# Patient Record
Sex: FEMALE | Race: WHITE | NOT HISPANIC OR LATINO | Employment: OTHER | URBAN - METROPOLITAN AREA
[De-identification: names, ages, dates, MRNs, and addresses within clinical notes are randomized per-mention and may not be internally consistent; named-entity substitution may affect disease eponyms.]

---

## 2017-01-11 ENCOUNTER — ALLSCRIPTS OFFICE VISIT (OUTPATIENT)
Dept: OTHER | Facility: OTHER | Age: 82
End: 2017-01-11

## 2017-01-19 ENCOUNTER — ALLSCRIPTS OFFICE VISIT (OUTPATIENT)
Dept: OTHER | Facility: OTHER | Age: 82
End: 2017-01-19

## 2017-01-20 ENCOUNTER — GENERIC CONVERSION - ENCOUNTER (OUTPATIENT)
Dept: OTHER | Facility: OTHER | Age: 82
End: 2017-01-20

## 2017-01-23 ENCOUNTER — GENERIC CONVERSION - ENCOUNTER (OUTPATIENT)
Dept: OTHER | Facility: OTHER | Age: 82
End: 2017-01-23

## 2018-01-01 ENCOUNTER — APPOINTMENT (EMERGENCY)
Dept: RADIOLOGY | Facility: HOSPITAL | Age: 83
End: 2018-01-01
Payer: MEDICARE

## 2018-01-01 ENCOUNTER — TELEPHONE (OUTPATIENT)
Dept: SURGERY | Facility: HOSPITAL | Age: 83
End: 2018-01-01

## 2018-01-01 ENCOUNTER — OFFICE VISIT (OUTPATIENT)
Dept: SURGERY | Facility: HOSPITAL | Age: 83
End: 2018-01-01
Payer: MEDICARE

## 2018-01-01 ENCOUNTER — HOSPITAL ENCOUNTER (EMERGENCY)
Facility: HOSPITAL | Age: 83
Discharge: HOME | End: 2018-08-17
Attending: EMERGENCY MEDICINE | Admitting: EMERGENCY MEDICINE
Payer: MEDICARE

## 2018-01-01 ENCOUNTER — HOSPITAL ENCOUNTER (INPATIENT)
Facility: HOSPITAL | Age: 83
LOS: 10 days | Discharge: NURSING, REHAB OR LONG-TERM CARE | DRG: 580 | End: 2018-07-18
Attending: EMERGENCY MEDICINE | Admitting: FAMILY MEDICINE
Payer: MEDICARE

## 2018-01-01 ENCOUNTER — OFFICE VISIT (OUTPATIENT)
Dept: INFECTIOUS DISEASES | Facility: HOSPITAL | Age: 83
End: 2018-01-01
Payer: MEDICARE

## 2018-01-01 ENCOUNTER — ANESTHESIA EVENT (INPATIENT)
Dept: PERIOP | Facility: HOSPITAL | Age: 83
DRG: 580 | End: 2018-01-01
Payer: MEDICARE

## 2018-01-01 ENCOUNTER — HOSPITAL ENCOUNTER (EMERGENCY)
Facility: HOSPITAL | Age: 83
Discharge: NURSING, REHAB OR LONG-TERM CARE | End: 2018-07-28
Attending: EMERGENCY MEDICINE
Payer: MEDICARE

## 2018-01-01 ENCOUNTER — ANESTHESIA (INPATIENT)
Dept: PERIOP | Facility: HOSPITAL | Age: 83
DRG: 580 | End: 2018-01-01
Payer: MEDICARE

## 2018-01-01 ENCOUNTER — OFFICE VISIT (OUTPATIENT)
Dept: GASTROENTEROLOGY | Facility: HOSPITAL | Age: 83
End: 2018-01-01
Attending: SURGERY
Payer: MEDICARE

## 2018-01-01 ENCOUNTER — APPOINTMENT (EMERGENCY)
Dept: CT IMAGING | Facility: HOSPITAL | Age: 83
DRG: 580 | End: 2018-01-01
Payer: MEDICARE

## 2018-01-01 ENCOUNTER — TELEPHONE (OUTPATIENT)
Dept: GASTROENTEROLOGY | Facility: HOSPITAL | Age: 83
End: 2018-01-01

## 2018-01-01 ENCOUNTER — APPOINTMENT (INPATIENT)
Dept: ULTRASOUND IMAGING | Facility: HOSPITAL | Age: 83
DRG: 580 | End: 2018-01-01
Payer: MEDICARE

## 2018-01-01 ENCOUNTER — OFFICE VISIT (OUTPATIENT)
Dept: SURGERY | Facility: HOSPITAL | Age: 83
End: 2018-01-01

## 2018-01-01 ENCOUNTER — APPOINTMENT (INPATIENT)
Dept: RADIOLOGY | Facility: HOSPITAL | Age: 83
DRG: 580 | End: 2018-01-01
Payer: MEDICARE

## 2018-01-01 ENCOUNTER — TELEPHONE (OUTPATIENT)
Dept: INTERVENTIONAL RADIOLOGY/VASCULAR | Facility: HOSPITAL | Age: 83
End: 2018-01-01

## 2018-01-01 ENCOUNTER — APPOINTMENT (INPATIENT)
Dept: NON INVASIVE DIAGNOSTICS | Facility: HOSPITAL | Age: 83
DRG: 580 | End: 2018-01-01
Payer: MEDICARE

## 2018-01-01 VITALS
RESPIRATION RATE: 18 BRPM | SYSTOLIC BLOOD PRESSURE: 155 MMHG | DIASTOLIC BLOOD PRESSURE: 70 MMHG | HEART RATE: 78 BPM | OXYGEN SATURATION: 97 % | TEMPERATURE: 97.6 F

## 2018-01-01 VITALS
HEART RATE: 75 BPM | TEMPERATURE: 98.1 F | SYSTOLIC BLOOD PRESSURE: 144 MMHG | RESPIRATION RATE: 18 BRPM | DIASTOLIC BLOOD PRESSURE: 63 MMHG | OXYGEN SATURATION: 98 % | HEIGHT: 56 IN | BODY MASS INDEX: 29.46 KG/M2 | WEIGHT: 130.95 LBS

## 2018-01-01 VITALS — DIASTOLIC BLOOD PRESSURE: 56 MMHG | TEMPERATURE: 98.1 F | HEART RATE: 70 BPM | SYSTOLIC BLOOD PRESSURE: 108 MMHG

## 2018-01-01 VITALS
HEART RATE: 73 BPM | TEMPERATURE: 98.1 F | BODY MASS INDEX: 24.08 KG/M2 | WEIGHT: 107.4 LBS | SYSTOLIC BLOOD PRESSURE: 144 MMHG | DIASTOLIC BLOOD PRESSURE: 66 MMHG

## 2018-01-01 VITALS — HEART RATE: 70 BPM | TEMPERATURE: 98.1 F | SYSTOLIC BLOOD PRESSURE: 108 MMHG | DIASTOLIC BLOOD PRESSURE: 56 MMHG

## 2018-01-01 VITALS
OXYGEN SATURATION: 94 % | DIASTOLIC BLOOD PRESSURE: 63 MMHG | SYSTOLIC BLOOD PRESSURE: 139 MMHG | WEIGHT: 130.95 LBS | TEMPERATURE: 99.3 F | HEART RATE: 74 BPM | BODY MASS INDEX: 29.36 KG/M2 | RESPIRATION RATE: 16 BRPM

## 2018-01-01 VITALS — TEMPERATURE: 98.1 F | SYSTOLIC BLOOD PRESSURE: 118 MMHG | DIASTOLIC BLOOD PRESSURE: 56 MMHG | HEART RATE: 75 BPM

## 2018-01-01 VITALS — BODY MASS INDEX: 21.97 KG/M2 | WEIGHT: 98 LBS

## 2018-01-01 DIAGNOSIS — K92.2 GASTROINTESTINAL HEMORRHAGE, UNSPECIFIED GASTROINTESTINAL HEMORRHAGE TYPE: ICD-10-CM

## 2018-01-01 DIAGNOSIS — L02.419 AXILLARY ABSCESS: Primary | ICD-10-CM

## 2018-01-01 DIAGNOSIS — J45.901 ASTHMA EXACERBATION, MILD: ICD-10-CM

## 2018-01-01 DIAGNOSIS — D50.9 IRON DEFICIENCY ANEMIA, UNSPECIFIED IRON DEFICIENCY ANEMIA TYPE: ICD-10-CM

## 2018-01-01 DIAGNOSIS — R22.31 MASS OF RIGHT AXILLA: ICD-10-CM

## 2018-01-01 DIAGNOSIS — R78.81 BACTEREMIA DUE TO GRAM-POSITIVE BACTERIA: ICD-10-CM

## 2018-01-01 DIAGNOSIS — R78.81 BACTEREMIA DUE TO METHICILLIN SUSCEPTIBLE STAPHYLOCOCCUS AUREUS (MSSA): ICD-10-CM

## 2018-01-01 DIAGNOSIS — L02.411 ABSCESS OF AXILLA, RIGHT: Primary | ICD-10-CM

## 2018-01-01 DIAGNOSIS — D64.9 ANEMIA: ICD-10-CM

## 2018-01-01 DIAGNOSIS — K62.3 RECTAL PROLAPSE: Primary | ICD-10-CM

## 2018-01-01 DIAGNOSIS — L02.411 ABSCESS OF AXILLA, RIGHT: ICD-10-CM

## 2018-01-01 DIAGNOSIS — R53.1 GENERALIZED WEAKNESS: ICD-10-CM

## 2018-01-01 DIAGNOSIS — B95.61 BACTEREMIA DUE TO METHICILLIN SUSCEPTIBLE STAPHYLOCOCCUS AUREUS (MSSA): Primary | ICD-10-CM

## 2018-01-01 DIAGNOSIS — R78.81 BACTEREMIA DUE TO METHICILLIN SUSCEPTIBLE STAPHYLOCOCCUS AUREUS (MSSA): Primary | ICD-10-CM

## 2018-01-01 DIAGNOSIS — K56.600 PARTIAL SMALL BOWEL OBSTRUCTION (HCC): ICD-10-CM

## 2018-01-01 DIAGNOSIS — K62.3 RECTAL PROLAPSE: ICD-10-CM

## 2018-01-01 DIAGNOSIS — L89.152 PRESSURE ULCER OF SACRAL REGION, STAGE 2 (HCC): Chronic | ICD-10-CM

## 2018-01-01 DIAGNOSIS — I10 BENIGN ESSENTIAL HYPERTENSION: ICD-10-CM

## 2018-01-01 DIAGNOSIS — N17.9 AKI (ACUTE KIDNEY INJURY) (HCC): Primary | ICD-10-CM

## 2018-01-01 DIAGNOSIS — B95.61 BACTEREMIA DUE TO METHICILLIN SUSCEPTIBLE STAPHYLOCOCCUS AUREUS (MSSA): ICD-10-CM

## 2018-01-01 DIAGNOSIS — Z79.2 RECEIVING INTRAVENOUS ANTIBIOTIC TREATMENT AS OUTPATIENT: ICD-10-CM

## 2018-01-01 LAB
ABO GROUP BLD: NORMAL
ALBUMIN SERPL BCP-MCNC: 1.7 G/DL (ref 3.5–5)
ALBUMIN SERPL BCP-MCNC: 1.8 G/DL (ref 3.5–5)
ALBUMIN SERPL BCP-MCNC: 2.3 G/DL (ref 3.5–5)
ALBUMIN SERPL BCP-MCNC: 2.7 G/DL (ref 3.5–5)
ALBUMIN SERPL BCP-MCNC: 2.8 G/DL (ref 3.5–5)
ALP SERPL-CCNC: 65 U/L (ref 46–116)
ALP SERPL-CCNC: 68 U/L (ref 46–116)
ALP SERPL-CCNC: 70 U/L (ref 46–116)
ALP SERPL-CCNC: 75 U/L (ref 46–116)
ALP SERPL-CCNC: 79 U/L (ref 46–116)
ALT SERPL W P-5'-P-CCNC: 13 U/L (ref 12–78)
ALT SERPL W P-5'-P-CCNC: 14 U/L (ref 12–78)
ALT SERPL W P-5'-P-CCNC: 15 U/L (ref 12–78)
ALT SERPL W P-5'-P-CCNC: 15 U/L (ref 12–78)
ALT SERPL W P-5'-P-CCNC: 18 U/L (ref 12–78)
ANION GAP BLD CALC-SCNC: 13 MMOL/L (ref 4–13)
ANION GAP SERPL CALCULATED.3IONS-SCNC: 10 MMOL/L (ref 4–13)
ANION GAP SERPL CALCULATED.3IONS-SCNC: 5 MMOL/L (ref 4–13)
ANION GAP SERPL CALCULATED.3IONS-SCNC: 5 MMOL/L (ref 4–13)
ANION GAP SERPL CALCULATED.3IONS-SCNC: 6 MMOL/L (ref 4–13)
ANION GAP SERPL CALCULATED.3IONS-SCNC: 7 MMOL/L (ref 4–13)
ANION GAP SERPL CALCULATED.3IONS-SCNC: 8 MMOL/L (ref 4–13)
ANION GAP SERPL CALCULATED.3IONS-SCNC: 8 MMOL/L (ref 4–13)
ANION GAP SERPL CALCULATED.3IONS-SCNC: 9 MMOL/L (ref 4–13)
ANISOCYTOSIS BLD QL SMEAR: PRESENT
APTT PPP: 36 SECONDS (ref 24–36)
APTT PPP: 45 SECONDS (ref 24–36)
AST SERPL W P-5'-P-CCNC: 14 U/L (ref 5–45)
AST SERPL W P-5'-P-CCNC: 14 U/L (ref 5–45)
AST SERPL W P-5'-P-CCNC: 19 U/L (ref 5–45)
AST SERPL W P-5'-P-CCNC: 19 U/L (ref 5–45)
AST SERPL W P-5'-P-CCNC: 43 U/L (ref 5–45)
ATRIAL RATE: 79 BPM
ATRIAL RATE: 91 BPM
BACTERIA BLD CULT: ABNORMAL
BACTERIA BLD CULT: ABNORMAL
BACTERIA BLD CULT: NORMAL
BACTERIA SPEC ANAEROBE CULT: NORMAL
BACTERIA WND AEROBE CULT: ABNORMAL
BASOPHILS # BLD AUTO: 0.01 THOUSANDS/ΜL (ref 0–0.1)
BASOPHILS # BLD AUTO: 0.01 THOUSANDS/ΜL (ref 0–0.1)
BASOPHILS # BLD AUTO: 0.02 THOUSANDS/ΜL (ref 0–0.1)
BASOPHILS # BLD AUTO: 0.02 THOUSANDS/ΜL (ref 0–0.1)
BASOPHILS # BLD AUTO: 0.03 THOUSANDS/ΜL (ref 0–0.1)
BASOPHILS # BLD MANUAL: 0 THOUSAND/UL (ref 0–0.1)
BASOPHILS NFR BLD AUTO: 0 % (ref 0–1)
BASOPHILS NFR BLD AUTO: 1 % (ref 0–1)
BASOPHILS NFR BLD AUTO: 1 % (ref 0–1)
BASOPHILS NFR MAR MANUAL: 0 % (ref 0–1)
BILIRUB SERPL-MCNC: 0.4 MG/DL (ref 0.2–1)
BILIRUB SERPL-MCNC: 0.4 MG/DL (ref 0.2–1)
BILIRUB SERPL-MCNC: 0.5 MG/DL (ref 0.2–1)
BILIRUB SERPL-MCNC: 0.6 MG/DL (ref 0.2–1)
BILIRUB SERPL-MCNC: 0.9 MG/DL (ref 0.2–1)
BILIRUB UR QL STRIP: NEGATIVE
BLD GP AB SCN SERPL QL: NEGATIVE
BUN BLD-MCNC: 38 MG/DL (ref 5–25)
BUN SERPL-MCNC: 12 MG/DL (ref 5–25)
BUN SERPL-MCNC: 14 MG/DL (ref 5–25)
BUN SERPL-MCNC: 14 MG/DL (ref 5–25)
BUN SERPL-MCNC: 16 MG/DL (ref 5–25)
BUN SERPL-MCNC: 25 MG/DL (ref 5–25)
BUN SERPL-MCNC: 26 MG/DL (ref 5–25)
BUN SERPL-MCNC: 32 MG/DL (ref 5–25)
BUN SERPL-MCNC: 41 MG/DL (ref 5–25)
BUN SERPL-MCNC: 43 MG/DL (ref 5–25)
BUN SERPL-MCNC: 45 MG/DL (ref 5–25)
CA-I BLD-SCNC: 1.21 MMOL/L (ref 1.12–1.32)
CALCIUM SERPL-MCNC: 7.6 MG/DL (ref 8.3–10.1)
CALCIUM SERPL-MCNC: 7.8 MG/DL (ref 8.3–10.1)
CALCIUM SERPL-MCNC: 7.9 MG/DL (ref 8.3–10.1)
CALCIUM SERPL-MCNC: 8 MG/DL (ref 8.3–10.1)
CALCIUM SERPL-MCNC: 8 MG/DL (ref 8.3–10.1)
CALCIUM SERPL-MCNC: 8.3 MG/DL (ref 8.3–10.1)
CALCIUM SERPL-MCNC: 8.5 MG/DL (ref 8.3–10.1)
CALCIUM SERPL-MCNC: 8.6 MG/DL (ref 8.3–10.1)
CALCIUM SERPL-MCNC: 8.7 MG/DL (ref 8.3–10.1)
CALCIUM SERPL-MCNC: 8.7 MG/DL (ref 8.3–10.1)
CHLORIDE BLD-SCNC: 106 MMOL/L (ref 100–108)
CHLORIDE SERPL-SCNC: 103 MMOL/L (ref 100–108)
CHLORIDE SERPL-SCNC: 104 MMOL/L (ref 100–108)
CHLORIDE SERPL-SCNC: 105 MMOL/L (ref 100–108)
CHLORIDE SERPL-SCNC: 106 MMOL/L (ref 100–108)
CHLORIDE SERPL-SCNC: 106 MMOL/L (ref 100–108)
CHLORIDE SERPL-SCNC: 107 MMOL/L (ref 100–108)
CHLORIDE SERPL-SCNC: 109 MMOL/L (ref 100–108)
CHLORIDE SERPL-SCNC: 99 MMOL/L (ref 100–108)
CLARITY UR: NORMAL
CO2 SERPL-SCNC: 20 MMOL/L (ref 21–32)
CO2 SERPL-SCNC: 21 MMOL/L (ref 21–32)
CO2 SERPL-SCNC: 23 MMOL/L (ref 21–32)
CO2 SERPL-SCNC: 23 MMOL/L (ref 21–32)
CO2 SERPL-SCNC: 24 MMOL/L (ref 21–32)
CO2 SERPL-SCNC: 27 MMOL/L (ref 21–32)
CO2 SERPL-SCNC: 28 MMOL/L (ref 21–32)
CO2 SERPL-SCNC: 32 MMOL/L (ref 21–32)
COLOR UR: YELLOW
CREAT BLD-MCNC: 1.4 MG/DL (ref 0.6–1.3)
CREAT SERPL-MCNC: 0.99 MG/DL (ref 0.6–1.3)
CREAT SERPL-MCNC: 0.99 MG/DL (ref 0.6–1.3)
CREAT SERPL-MCNC: 1.07 MG/DL (ref 0.6–1.3)
CREAT SERPL-MCNC: 1.12 MG/DL (ref 0.6–1.3)
CREAT SERPL-MCNC: 1.13 MG/DL (ref 0.6–1.3)
CREAT SERPL-MCNC: 1.25 MG/DL (ref 0.6–1.3)
CREAT SERPL-MCNC: 1.27 MG/DL (ref 0.6–1.3)
CREAT SERPL-MCNC: 1.3 MG/DL (ref 0.6–1.3)
CREAT SERPL-MCNC: 1.96 MG/DL (ref 0.6–1.3)
CREAT SERPL-MCNC: 2.47 MG/DL (ref 0.6–1.3)
CREAT UR-MCNC: 187 MG/DL
EOSINOPHIL # BLD AUTO: 0.05 THOUSAND/ΜL (ref 0–0.61)
EOSINOPHIL # BLD AUTO: 0.05 THOUSAND/ΜL (ref 0–0.61)
EOSINOPHIL # BLD AUTO: 0.06 THOUSAND/ΜL (ref 0–0.61)
EOSINOPHIL # BLD AUTO: 0.08 THOUSAND/ΜL (ref 0–0.61)
EOSINOPHIL # BLD AUTO: 0.1 THOUSAND/ΜL (ref 0–0.61)
EOSINOPHIL # BLD MANUAL: 0 THOUSAND/UL (ref 0–0.4)
EOSINOPHIL NFR BLD AUTO: 1 % (ref 0–6)
EOSINOPHIL NFR BLD AUTO: 2 % (ref 0–6)
EOSINOPHIL NFR BLD AUTO: 2 % (ref 0–6)
EOSINOPHIL NFR BLD MANUAL: 0 % (ref 0–6)
ERYTHROCYTE [DISTWIDTH] IN BLOOD BY AUTOMATED COUNT: 14.6 % (ref 11.6–15.1)
ERYTHROCYTE [DISTWIDTH] IN BLOOD BY AUTOMATED COUNT: 15.7 % (ref 11.6–15.1)
ERYTHROCYTE [DISTWIDTH] IN BLOOD BY AUTOMATED COUNT: 15.8 % (ref 11.6–15.1)
ERYTHROCYTE [DISTWIDTH] IN BLOOD BY AUTOMATED COUNT: 16 % (ref 11.6–15.1)
ERYTHROCYTE [DISTWIDTH] IN BLOOD BY AUTOMATED COUNT: 16.1 % (ref 11.6–15.1)
ERYTHROCYTE [DISTWIDTH] IN BLOOD BY AUTOMATED COUNT: 16.2 % (ref 11.6–15.1)
ERYTHROCYTE [DISTWIDTH] IN BLOOD BY AUTOMATED COUNT: 16.5 % (ref 11.6–15.1)
ERYTHROCYTE [DISTWIDTH] IN BLOOD BY AUTOMATED COUNT: 16.7 % (ref 11.6–15.1)
ERYTHROCYTE [DISTWIDTH] IN BLOOD BY AUTOMATED COUNT: 16.8 % (ref 11.6–15.1)
ERYTHROCYTE [DISTWIDTH] IN BLOOD BY AUTOMATED COUNT: 16.8 % (ref 11.6–15.1)
EST. AVERAGE GLUCOSE BLD GHB EST-MCNC: 111 MG/DL
FERRITIN SERPL-MCNC: 142 NG/ML (ref 8–388)
GFR SERPL CREATININE-BSD FRML MDRD: 18 ML/MIN/1.73SQ M
GFR SERPL CREATININE-BSD FRML MDRD: 23 ML/MIN/1.73SQ M
GFR SERPL CREATININE-BSD FRML MDRD: 35 ML/MIN/1.73SQ M
GFR SERPL CREATININE-BSD FRML MDRD: 39 ML/MIN/1.73SQ M
GFR SERPL CREATININE-BSD FRML MDRD: 40 ML/MIN/1.73SQ M
GFR SERPL CREATININE-BSD FRML MDRD: 40 ML/MIN/1.73SQ M
GFR SERPL CREATININE-BSD FRML MDRD: 46 ML/MIN/1.73SQ M
GFR SERPL CREATININE-BSD FRML MDRD: 46 ML/MIN/1.73SQ M
GFR SERPL CREATININE-BSD FRML MDRD: 49 ML/MIN/1.73SQ M
GFR SERPL CREATININE-BSD FRML MDRD: 54 ML/MIN/1.73SQ M
GFR SERPL CREATININE-BSD FRML MDRD: 54 ML/MIN/1.73SQ M
GLUCOSE SERPL-MCNC: 112 MG/DL (ref 65–140)
GLUCOSE SERPL-MCNC: 115 MG/DL (ref 65–140)
GLUCOSE SERPL-MCNC: 116 MG/DL (ref 65–140)
GLUCOSE SERPL-MCNC: 120 MG/DL (ref 65–140)
GLUCOSE SERPL-MCNC: 123 MG/DL (ref 65–140)
GLUCOSE SERPL-MCNC: 127 MG/DL (ref 65–140)
GLUCOSE SERPL-MCNC: 128 MG/DL (ref 65–140)
GLUCOSE SERPL-MCNC: 141 MG/DL (ref 65–140)
GLUCOSE SERPL-MCNC: 156 MG/DL (ref 65–140)
GLUCOSE SERPL-MCNC: 232 MG/DL (ref 65–140)
GLUCOSE SERPL-MCNC: 99 MG/DL (ref 65–140)
GLUCOSE UR STRIP-MCNC: NEGATIVE MG/DL
GRAM STN SPEC: ABNORMAL
HBA1C MFR BLD: 5.5 % (ref 4.2–6.3)
HCT VFR BLD AUTO: 23.5 % (ref 34.8–46.1)
HCT VFR BLD AUTO: 23.9 % (ref 34.8–46.1)
HCT VFR BLD AUTO: 24.5 % (ref 34.8–46.1)
HCT VFR BLD AUTO: 25.3 % (ref 34.8–46.1)
HCT VFR BLD AUTO: 25.3 % (ref 34.8–46.1)
HCT VFR BLD AUTO: 25.9 % (ref 34.8–46.1)
HCT VFR BLD AUTO: 26.4 % (ref 34.8–46.1)
HCT VFR BLD AUTO: 26.9 % (ref 34.8–46.1)
HCT VFR BLD AUTO: 28.1 % (ref 34.8–46.1)
HCT VFR BLD AUTO: 29.6 % (ref 34.8–46.1)
HCT VFR BLD CALC: 28 % (ref 34.8–46.1)
HEMOCCULT STL QL: NEGATIVE
HGB BLD-MCNC: 7.4 G/DL (ref 11.5–15.4)
HGB BLD-MCNC: 7.5 G/DL (ref 11.5–15.4)
HGB BLD-MCNC: 7.8 G/DL (ref 11.5–15.4)
HGB BLD-MCNC: 7.9 G/DL (ref 11.5–15.4)
HGB BLD-MCNC: 8.2 G/DL (ref 11.5–15.4)
HGB BLD-MCNC: 8.3 G/DL (ref 11.5–15.4)
HGB BLD-MCNC: 8.5 G/DL (ref 11.5–15.4)
HGB BLD-MCNC: 8.6 G/DL (ref 11.5–15.4)
HGB BLD-MCNC: 9 G/DL (ref 11.5–15.4)
HGB BLD-MCNC: 9.1 G/DL (ref 11.5–15.4)
HGB BLDA-MCNC: 9.5 G/DL (ref 11.5–15.4)
HGB UR QL STRIP.AUTO: NEGATIVE
IMM GRANULOCYTES # BLD AUTO: 0.01 THOUSAND/UL (ref 0–0.2)
IMM GRANULOCYTES NFR BLD AUTO: 0 % (ref 0–2)
INR PPP: 1.05 (ref 0.86–1.17)
INR PPP: 1.11 (ref 0.86–1.17)
INR PPP: 1.13 (ref 0.86–1.17)
INR PPP: 1.16 (ref 0.86–1.17)
IRON SATN MFR SERPL: 5 %
IRON SERPL-MCNC: 13 UG/DL (ref 50–170)
KETONES UR STRIP-MCNC: NEGATIVE MG/DL
LEUKOCYTE ESTERASE UR QL STRIP: NEGATIVE
LYMPHOCYTES # BLD AUTO: 0.64 THOUSAND/UL (ref 0.6–4.47)
LYMPHOCYTES # BLD AUTO: 0.81 THOUSANDS/ΜL (ref 0.6–4.47)
LYMPHOCYTES # BLD AUTO: 0.93 THOUSANDS/ΜL (ref 0.6–4.47)
LYMPHOCYTES # BLD AUTO: 1.06 THOUSANDS/ΜL (ref 0.6–4.47)
LYMPHOCYTES # BLD AUTO: 1.17 THOUSANDS/ΜL (ref 0.6–4.47)
LYMPHOCYTES # BLD AUTO: 1.88 THOUSANDS/ΜL (ref 0.6–4.47)
LYMPHOCYTES # BLD AUTO: 5 % (ref 14–44)
LYMPHOCYTES NFR BLD AUTO: 15 % (ref 14–44)
LYMPHOCYTES NFR BLD AUTO: 16 % (ref 14–44)
LYMPHOCYTES NFR BLD AUTO: 21 % (ref 14–44)
LYMPHOCYTES NFR BLD AUTO: 22 % (ref 14–44)
LYMPHOCYTES NFR BLD AUTO: 38 % (ref 14–44)
MAGNESIUM SERPL-MCNC: 1.6 MG/DL (ref 1.6–2.6)
MAGNESIUM SERPL-MCNC: 1.9 MG/DL (ref 1.6–2.6)
MAGNESIUM SERPL-MCNC: 1.9 MG/DL (ref 1.6–2.6)
MCH RBC QN AUTO: 28.8 PG (ref 26.8–34.3)
MCH RBC QN AUTO: 29.6 PG (ref 26.8–34.3)
MCH RBC QN AUTO: 29.6 PG (ref 26.8–34.3)
MCH RBC QN AUTO: 29.7 PG (ref 26.8–34.3)
MCH RBC QN AUTO: 29.8 PG (ref 26.8–34.3)
MCH RBC QN AUTO: 29.9 PG (ref 26.8–34.3)
MCH RBC QN AUTO: 29.9 PG (ref 26.8–34.3)
MCH RBC QN AUTO: 30.1 PG (ref 26.8–34.3)
MCH RBC QN AUTO: 30.2 PG (ref 26.8–34.3)
MCH RBC QN AUTO: 30.7 PG (ref 26.8–34.3)
MCHC RBC AUTO-ENTMCNC: 30.4 G/DL (ref 31.4–37.4)
MCHC RBC AUTO-ENTMCNC: 31 G/DL (ref 31.4–37.4)
MCHC RBC AUTO-ENTMCNC: 31.2 G/DL (ref 31.4–37.4)
MCHC RBC AUTO-ENTMCNC: 31.7 G/DL (ref 31.4–37.4)
MCHC RBC AUTO-ENTMCNC: 31.8 G/DL (ref 31.4–37.4)
MCHC RBC AUTO-ENTMCNC: 31.9 G/DL (ref 31.4–37.4)
MCHC RBC AUTO-ENTMCNC: 32 G/DL (ref 31.4–37.4)
MCHC RBC AUTO-ENTMCNC: 32.2 G/DL (ref 31.4–37.4)
MCHC RBC AUTO-ENTMCNC: 32.4 G/DL (ref 31.4–37.4)
MCHC RBC AUTO-ENTMCNC: 32.8 G/DL (ref 31.4–37.4)
MCV RBC AUTO: 92 FL (ref 82–98)
MCV RBC AUTO: 92 FL (ref 82–98)
MCV RBC AUTO: 93 FL (ref 82–98)
MCV RBC AUTO: 94 FL (ref 82–98)
MCV RBC AUTO: 94 FL (ref 82–98)
MCV RBC AUTO: 95 FL (ref 82–98)
MCV RBC AUTO: 97 FL (ref 82–98)
MCV RBC AUTO: 98 FL (ref 82–98)
MICROALBUMIN UR-MCNC: 20.7 MG/L (ref 0–20)
MICROALBUMIN/CREAT 24H UR: 11 MG/G CREATININE (ref 0–30)
MICROCYTES BLD QL AUTO: PRESENT
MONOCYTES # BLD AUTO: 0.13 THOUSAND/UL (ref 0–1.22)
MONOCYTES # BLD AUTO: 0.29 THOUSAND/ΜL (ref 0.17–1.22)
MONOCYTES # BLD AUTO: 0.36 THOUSAND/ΜL (ref 0.17–1.22)
MONOCYTES # BLD AUTO: 0.36 THOUSAND/ΜL (ref 0.17–1.22)
MONOCYTES # BLD AUTO: 0.39 THOUSAND/ΜL (ref 0.17–1.22)
MONOCYTES # BLD AUTO: 0.47 THOUSAND/ΜL (ref 0.17–1.22)
MONOCYTES NFR BLD AUTO: 6 % (ref 4–12)
MONOCYTES NFR BLD AUTO: 7 % (ref 4–12)
MONOCYTES NFR BLD AUTO: 9 % (ref 4–12)
MONOCYTES NFR BLD: 1 % (ref 4–12)
NEUTROPHILS # BLD AUTO: 2.66 THOUSANDS/ΜL (ref 1.85–7.62)
NEUTROPHILS # BLD AUTO: 3.03 THOUSANDS/ΜL (ref 1.85–7.62)
NEUTROPHILS # BLD AUTO: 3.96 THOUSANDS/ΜL (ref 1.85–7.62)
NEUTROPHILS # BLD AUTO: 4.16 THOUSANDS/ΜL (ref 1.85–7.62)
NEUTROPHILS # BLD AUTO: 5.18 THOUSANDS/ΜL (ref 1.85–7.62)
NEUTROPHILS # BLD MANUAL: 12.03 THOUSAND/UL (ref 1.85–7.62)
NEUTS SEG NFR BLD AUTO: 52 % (ref 43–75)
NEUTS SEG NFR BLD AUTO: 70 % (ref 43–75)
NEUTS SEG NFR BLD AUTO: 71 % (ref 43–75)
NEUTS SEG NFR BLD AUTO: 75 % (ref 43–75)
NEUTS SEG NFR BLD AUTO: 78 % (ref 43–75)
NEUTS SEG NFR BLD AUTO: 94 % (ref 43–75)
NITRITE UR QL STRIP: NEGATIVE
NRBC BLD AUTO-RTO: 0 /100 WBCS
OSMOLALITY UR: 351 MMOL/KG
P AXIS: 39 DEGREES
P AXIS: 93 DEGREES
PCO2 BLD: 26 MMOL/L (ref 21–32)
PH UR STRIP.AUTO: 5 [PH] (ref 4.5–8)
PHOSPHATE SERPL-MCNC: 3 MG/DL (ref 2.3–4.1)
PLATELET # BLD AUTO: 109 THOUSANDS/UL (ref 149–390)
PLATELET # BLD AUTO: 115 THOUSANDS/UL (ref 149–390)
PLATELET # BLD AUTO: 117 THOUSANDS/UL (ref 149–390)
PLATELET # BLD AUTO: 129 THOUSANDS/UL (ref 149–390)
PLATELET # BLD AUTO: 141 THOUSANDS/UL (ref 149–390)
PLATELET # BLD AUTO: 143 THOUSANDS/UL (ref 149–390)
PLATELET # BLD AUTO: 148 THOUSANDS/UL (ref 149–390)
PLATELET # BLD AUTO: 154 THOUSANDS/UL (ref 149–390)
PLATELET # BLD AUTO: 200 THOUSANDS/UL (ref 149–390)
PLATELET # BLD AUTO: 206 THOUSANDS/UL (ref 149–390)
PLATELET BLD QL SMEAR: ABNORMAL
PMV BLD AUTO: 10.6 FL (ref 8.9–12.7)
PMV BLD AUTO: 10.6 FL (ref 8.9–12.7)
PMV BLD AUTO: 10.8 FL (ref 8.9–12.7)
PMV BLD AUTO: 11 FL (ref 8.9–12.7)
PMV BLD AUTO: 11.1 FL (ref 8.9–12.7)
PMV BLD AUTO: 11.4 FL (ref 8.9–12.7)
PMV BLD AUTO: 11.5 FL (ref 8.9–12.7)
PMV BLD AUTO: 12 FL (ref 8.9–12.7)
PMV BLD AUTO: 9.4 FL (ref 8.9–12.7)
PMV BLD AUTO: 9.9 FL (ref 8.9–12.7)
POTASSIUM BLD-SCNC: 4.3 MMOL/L (ref 3.5–5.3)
POTASSIUM SERPL-SCNC: 3.3 MMOL/L (ref 3.5–5.3)
POTASSIUM SERPL-SCNC: 3.4 MMOL/L (ref 3.5–5.3)
POTASSIUM SERPL-SCNC: 3.6 MMOL/L (ref 3.5–5.3)
POTASSIUM SERPL-SCNC: 3.6 MMOL/L (ref 3.5–5.3)
POTASSIUM SERPL-SCNC: 3.7 MMOL/L (ref 3.5–5.3)
POTASSIUM SERPL-SCNC: 3.8 MMOL/L (ref 3.5–5.3)
POTASSIUM SERPL-SCNC: 3.9 MMOL/L (ref 3.5–5.3)
POTASSIUM SERPL-SCNC: 4.1 MMOL/L (ref 3.5–5.3)
POTASSIUM SERPL-SCNC: 4.6 MMOL/L (ref 3.5–5.3)
POTASSIUM SERPL-SCNC: 6.1 MMOL/L (ref 3.5–5.3)
PR INTERVAL: 124 MS
PR INTERVAL: 144 MS
PROCALCITONIN SERPL-MCNC: 1.32 NG/ML
PROT SERPL-MCNC: 5.1 G/DL (ref 6.4–8.2)
PROT SERPL-MCNC: 5.3 G/DL (ref 6.4–8.2)
PROT SERPL-MCNC: 5.8 G/DL (ref 6.4–8.2)
PROT SERPL-MCNC: 6.5 G/DL (ref 6.4–8.2)
PROT SERPL-MCNC: 7 G/DL (ref 6.4–8.2)
PROT UR STRIP-MCNC: NEGATIVE MG/DL
PROT UR-MCNC: 34 MG/DL
PROT/CREAT UR: 0.18 MG/G{CREAT} (ref 0–0.1)
PROTHROMBIN TIME: 13.2 SECONDS (ref 11.8–14.2)
PROTHROMBIN TIME: 13.8 SECONDS (ref 11.8–14.2)
PROTHROMBIN TIME: 14 SECONDS (ref 11.8–14.2)
PROTHROMBIN TIME: 14.2 SECONDS (ref 11.8–14.2)
QRS AXIS: 65 DEGREES
QRS AXIS: 72 DEGREES
QRSD INTERVAL: 68 MS
QRSD INTERVAL: 82 MS
QT INTERVAL: 358 MS
QT INTERVAL: 376 MS
QTC INTERVAL: 431 MS
QTC INTERVAL: 440 MS
RBC # BLD AUTO: 2.52 MILLION/UL (ref 3.81–5.12)
RBC # BLD AUTO: 2.57 MILLION/UL (ref 3.81–5.12)
RBC # BLD AUTO: 2.59 MILLION/UL (ref 3.81–5.12)
RBC # BLD AUTO: 2.62 MILLION/UL (ref 3.81–5.12)
RBC # BLD AUTO: 2.7 MILLION/UL (ref 3.81–5.12)
RBC # BLD AUTO: 2.77 MILLION/UL (ref 3.81–5.12)
RBC # BLD AUTO: 2.84 MILLION/UL (ref 3.81–5.12)
RBC # BLD AUTO: 2.91 MILLION/UL (ref 3.81–5.12)
RBC # BLD AUTO: 3.03 MILLION/UL (ref 3.81–5.12)
RBC # BLD AUTO: 3.04 MILLION/UL (ref 3.81–5.12)
RETICS # AUTO: NORMAL 10*3/UL (ref 14097–95744)
RETICS # CALC: 1.56 % (ref 0.37–1.87)
RH BLD: POSITIVE
SODIUM 24H UR-SCNC: 8 MOL/L
SODIUM BLD-SCNC: 140 MMOL/L (ref 136–145)
SODIUM SERPL-SCNC: 133 MMOL/L (ref 136–145)
SODIUM SERPL-SCNC: 134 MMOL/L (ref 136–145)
SODIUM SERPL-SCNC: 134 MMOL/L (ref 136–145)
SODIUM SERPL-SCNC: 135 MMOL/L (ref 136–145)
SODIUM SERPL-SCNC: 136 MMOL/L (ref 136–145)
SODIUM SERPL-SCNC: 137 MMOL/L (ref 136–145)
SODIUM SERPL-SCNC: 138 MMOL/L (ref 136–145)
SODIUM SERPL-SCNC: 139 MMOL/L (ref 136–145)
SODIUM SERPL-SCNC: 139 MMOL/L (ref 136–145)
SODIUM SERPL-SCNC: 140 MMOL/L (ref 136–145)
SP GR UR STRIP.AUTO: 1.02 (ref 1–1.03)
SPECIMEN EXPIRATION DATE: NORMAL
SPECIMEN SOURCE: ABNORMAL
T WAVE AXIS: 48 DEGREES
T WAVE AXIS: 53 DEGREES
TIBC SERPL-MCNC: 273 UG/DL (ref 250–450)
TOTAL CELLS COUNTED SPEC: 100
TROPONIN I SERPL-MCNC: <0.02 NG/ML
TROPONIN I SERPL-MCNC: <0.02 NG/ML
UROBILINOGEN UR QL STRIP.AUTO: 0.2 E.U./DL
UUN 24H UR-MCNC: 594 MG/DL
VENTRICULAR RATE: 79 BPM
VENTRICULAR RATE: 91 BPM
WBC # BLD AUTO: 11.53 THOUSAND/UL (ref 4.31–10.16)
WBC # BLD AUTO: 12.8 THOUSAND/UL (ref 4.31–10.16)
WBC # BLD AUTO: 13.17 THOUSAND/UL (ref 4.31–10.16)
WBC # BLD AUTO: 17.51 THOUSAND/UL (ref 4.31–10.16)
WBC # BLD AUTO: 4.33 THOUSAND/UL (ref 4.31–10.16)
WBC # BLD AUTO: 5.02 THOUSAND/UL (ref 4.31–10.16)
WBC # BLD AUTO: 5.55 THOUSAND/UL (ref 4.31–10.16)
WBC # BLD AUTO: 5.56 THOUSAND/UL (ref 4.31–10.16)
WBC # BLD AUTO: 6.16 THOUSAND/UL (ref 4.31–10.16)
WBC # BLD AUTO: 6.69 THOUSAND/UL (ref 4.31–10.16)

## 2018-01-01 PROCEDURE — 85730 THROMBOPLASTIN TIME PARTIAL: CPT | Performed by: PHYSICIAN ASSISTANT

## 2018-01-01 PROCEDURE — 85025 COMPLETE CBC W/AUTO DIFF WBC: CPT | Performed by: EMERGENCY MEDICINE

## 2018-01-01 PROCEDURE — 99238 HOSP IP/OBS DSCHRG MGMT 30/<: CPT | Performed by: PHYSICIAN ASSISTANT

## 2018-01-01 PROCEDURE — G0425 INPT/ED TELECONSULT30: HCPCS | Performed by: INTERNAL MEDICINE

## 2018-01-01 PROCEDURE — 86850 RBC ANTIBODY SCREEN: CPT | Performed by: EMERGENCY MEDICINE

## 2018-01-01 PROCEDURE — 73030 X-RAY EXAM OF SHOULDER: CPT

## 2018-01-01 PROCEDURE — 80048 BASIC METABOLIC PNL TOTAL CA: CPT | Performed by: PHYSICIAN ASSISTANT

## 2018-01-01 PROCEDURE — 99232 SBSQ HOSP IP/OBS MODERATE 35: CPT | Performed by: INTERNAL MEDICINE

## 2018-01-01 PROCEDURE — 99222 1ST HOSP IP/OBS MODERATE 55: CPT | Performed by: PHYSICIAN ASSISTANT

## 2018-01-01 PROCEDURE — 87205 SMEAR GRAM STAIN: CPT | Performed by: SURGERY

## 2018-01-01 PROCEDURE — 80053 COMPREHEN METABOLIC PANEL: CPT | Performed by: EMERGENCY MEDICINE

## 2018-01-01 PROCEDURE — 87040 BLOOD CULTURE FOR BACTERIA: CPT | Performed by: INTERNAL MEDICINE

## 2018-01-01 PROCEDURE — 99024 POSTOP FOLLOW-UP VISIT: CPT

## 2018-01-01 PROCEDURE — 99232 SBSQ HOSP IP/OBS MODERATE 35: CPT | Performed by: FAMILY MEDICINE

## 2018-01-01 PROCEDURE — 99205 OFFICE O/P NEW HI 60 MIN: CPT | Performed by: COLON & RECTAL SURGERY

## 2018-01-01 PROCEDURE — 97116 GAIT TRAINING THERAPY: CPT

## 2018-01-01 PROCEDURE — 76937 US GUIDE VASCULAR ACCESS: CPT

## 2018-01-01 PROCEDURE — 84145 PROCALCITONIN (PCT): CPT | Performed by: INTERNAL MEDICINE

## 2018-01-01 PROCEDURE — 97530 THERAPEUTIC ACTIVITIES: CPT

## 2018-01-01 PROCEDURE — 86901 BLOOD TYPING SEROLOGIC RH(D): CPT | Performed by: EMERGENCY MEDICINE

## 2018-01-01 PROCEDURE — 46600 DIAGNOSTIC ANOSCOPY SPX: CPT | Performed by: COLON & RECTAL SURGERY

## 2018-01-01 PROCEDURE — 93306 TTE W/DOPPLER COMPLETE: CPT

## 2018-01-01 PROCEDURE — 93010 ELECTROCARDIOGRAM REPORT: CPT | Performed by: INTERNAL MEDICINE

## 2018-01-01 PROCEDURE — 0K9F0ZZ DRAINAGE OF RIGHT TRUNK MUSCLE, OPEN APPROACH: ICD-10-PCS | Performed by: INTERNAL MEDICINE

## 2018-01-01 PROCEDURE — 80048 BASIC METABOLIC PNL TOTAL CA: CPT | Performed by: EMERGENCY MEDICINE

## 2018-01-01 PROCEDURE — 96361 HYDRATE IV INFUSION ADD-ON: CPT

## 2018-01-01 PROCEDURE — 77001 FLUOROGUIDE FOR VEIN DEVICE: CPT

## 2018-01-01 PROCEDURE — 80053 COMPREHEN METABOLIC PANEL: CPT | Performed by: FAMILY MEDICINE

## 2018-01-01 PROCEDURE — 99285 EMERGENCY DEPT VISIT HI MDM: CPT

## 2018-01-01 PROCEDURE — G8988 SELF CARE GOAL STATUS: HCPCS

## 2018-01-01 PROCEDURE — 36415 COLL VENOUS BLD VENIPUNCTURE: CPT | Performed by: EMERGENCY MEDICINE

## 2018-01-01 PROCEDURE — 99284 EMERGENCY DEPT VISIT MOD MDM: CPT

## 2018-01-01 PROCEDURE — 97535 SELF CARE MNGMENT TRAINING: CPT

## 2018-01-01 PROCEDURE — 10061 I&D ABSCESS COMP/MULTIPLE: CPT | Performed by: SURGERY

## 2018-01-01 PROCEDURE — 83540 ASSAY OF IRON: CPT | Performed by: INTERNAL MEDICINE

## 2018-01-01 PROCEDURE — 85025 COMPLETE CBC W/AUTO DIFF WBC: CPT | Performed by: PHYSICIAN ASSISTANT

## 2018-01-01 PROCEDURE — 82570 ASSAY OF URINE CREATININE: CPT | Performed by: INTERNAL MEDICINE

## 2018-01-01 PROCEDURE — 84300 ASSAY OF URINE SODIUM: CPT | Performed by: INTERNAL MEDICINE

## 2018-01-01 PROCEDURE — 85610 PROTHROMBIN TIME: CPT | Performed by: PHYSICIAN ASSISTANT

## 2018-01-01 PROCEDURE — 83550 IRON BINDING TEST: CPT | Performed by: INTERNAL MEDICINE

## 2018-01-01 PROCEDURE — 99214 OFFICE O/P EST MOD 30 MIN: CPT | Performed by: INTERNAL MEDICINE

## 2018-01-01 PROCEDURE — 84100 ASSAY OF PHOSPHORUS: CPT | Performed by: PHYSICIAN ASSISTANT

## 2018-01-01 PROCEDURE — 82043 UR ALBUMIN QUANTITATIVE: CPT | Performed by: INTERNAL MEDICINE

## 2018-01-01 PROCEDURE — 85027 COMPLETE CBC AUTOMATED: CPT | Performed by: INTERNAL MEDICINE

## 2018-01-01 PROCEDURE — 87075 CULTR BACTERIA EXCEPT BLOOD: CPT | Performed by: SURGERY

## 2018-01-01 PROCEDURE — 93306 TTE W/DOPPLER COMPLETE: CPT | Performed by: INTERNAL MEDICINE

## 2018-01-01 PROCEDURE — 80048 BASIC METABOLIC PNL TOTAL CA: CPT

## 2018-01-01 PROCEDURE — 93005 ELECTROCARDIOGRAM TRACING: CPT

## 2018-01-01 PROCEDURE — 85007 BL SMEAR W/DIFF WBC COUNT: CPT | Performed by: FAMILY MEDICINE

## 2018-01-01 PROCEDURE — 82272 OCCULT BLD FECES 1-3 TESTS: CPT | Performed by: INTERNAL MEDICINE

## 2018-01-01 PROCEDURE — 81003 URINALYSIS AUTO W/O SCOPE: CPT | Performed by: EMERGENCY MEDICINE

## 2018-01-01 PROCEDURE — 84484 ASSAY OF TROPONIN QUANT: CPT | Performed by: EMERGENCY MEDICINE

## 2018-01-01 PROCEDURE — 36415 COLL VENOUS BLD VENIPUNCTURE: CPT

## 2018-01-01 PROCEDURE — G8979 MOBILITY GOAL STATUS: HCPCS | Performed by: PHYSICAL THERAPIST

## 2018-01-01 PROCEDURE — 84540 ASSAY OF URINE/UREA-N: CPT | Performed by: INTERNAL MEDICINE

## 2018-01-01 PROCEDURE — 71250 CT THORAX DX C-: CPT

## 2018-01-01 PROCEDURE — 87147 CULTURE TYPE IMMUNOLOGIC: CPT | Performed by: SURGERY

## 2018-01-01 PROCEDURE — 85025 COMPLETE CBC W/AUTO DIFF WBC: CPT

## 2018-01-01 PROCEDURE — 99232 SBSQ HOSP IP/OBS MODERATE 35: CPT | Performed by: SURGERY

## 2018-01-01 PROCEDURE — 85014 HEMATOCRIT: CPT

## 2018-01-01 PROCEDURE — 80048 BASIC METABOLIC PNL TOTAL CA: CPT | Performed by: INTERNAL MEDICINE

## 2018-01-01 PROCEDURE — 80053 COMPREHEN METABOLIC PANEL: CPT | Performed by: PHYSICIAN ASSISTANT

## 2018-01-01 PROCEDURE — 94640 AIRWAY INHALATION TREATMENT: CPT

## 2018-01-01 PROCEDURE — 97110 THERAPEUTIC EXERCISES: CPT

## 2018-01-01 PROCEDURE — 76770 US EXAM ABDO BACK WALL COMP: CPT

## 2018-01-01 PROCEDURE — G8978 MOBILITY CURRENT STATUS: HCPCS | Performed by: PHYSICAL THERAPIST

## 2018-01-01 PROCEDURE — 82728 ASSAY OF FERRITIN: CPT | Performed by: INTERNAL MEDICINE

## 2018-01-01 PROCEDURE — 85610 PROTHROMBIN TIME: CPT | Performed by: EMERGENCY MEDICINE

## 2018-01-01 PROCEDURE — 85730 THROMBOPLASTIN TIME PARTIAL: CPT | Performed by: EMERGENCY MEDICINE

## 2018-01-01 PROCEDURE — 74176 CT ABD & PELVIS W/O CONTRAST: CPT

## 2018-01-01 PROCEDURE — 85045 AUTOMATED RETICULOCYTE COUNT: CPT | Performed by: INTERNAL MEDICINE

## 2018-01-01 PROCEDURE — 80047 BASIC METABLC PNL IONIZED CA: CPT

## 2018-01-01 PROCEDURE — C1751 CATH, INF, PER/CENT/MIDLINE: HCPCS

## 2018-01-01 PROCEDURE — 71045 X-RAY EXAM CHEST 1 VIEW: CPT

## 2018-01-01 PROCEDURE — 77001 FLUOROGUIDE FOR VEIN DEVICE: CPT | Performed by: RADIOLOGY

## 2018-01-01 PROCEDURE — 83935 ASSAY OF URINE OSMOLALITY: CPT | Performed by: INTERNAL MEDICINE

## 2018-01-01 PROCEDURE — 97530 THERAPEUTIC ACTIVITIES: CPT | Performed by: PHYSICAL THERAPIST

## 2018-01-01 PROCEDURE — 83735 ASSAY OF MAGNESIUM: CPT | Performed by: EMERGENCY MEDICINE

## 2018-01-01 PROCEDURE — 76882 US LMTD JT/FCL EVL NVASC XTR: CPT

## 2018-01-01 PROCEDURE — 99232 SBSQ HOSP IP/OBS MODERATE 35: CPT | Performed by: PHYSICIAN ASSISTANT

## 2018-01-01 PROCEDURE — 99221 1ST HOSP IP/OBS SF/LOW 40: CPT | Performed by: SURGERY

## 2018-01-01 PROCEDURE — 84156 ASSAY OF PROTEIN URINE: CPT | Performed by: INTERNAL MEDICINE

## 2018-01-01 PROCEDURE — 97163 PT EVAL HIGH COMPLEX 45 MIN: CPT | Performed by: PHYSICAL THERAPIST

## 2018-01-01 PROCEDURE — 96360 HYDRATION IV INFUSION INIT: CPT

## 2018-01-01 PROCEDURE — 99024 POSTOP FOLLOW-UP VISIT: CPT | Performed by: SURGERY

## 2018-01-01 PROCEDURE — 87186 SC STD MICRODIL/AGAR DIL: CPT | Performed by: SURGERY

## 2018-01-01 PROCEDURE — 80053 COMPREHEN METABOLIC PANEL: CPT | Performed by: INTERNAL MEDICINE

## 2018-01-01 PROCEDURE — 97167 OT EVAL HIGH COMPLEX 60 MIN: CPT

## 2018-01-01 PROCEDURE — 83735 ASSAY OF MAGNESIUM: CPT | Performed by: FAMILY MEDICINE

## 2018-01-01 PROCEDURE — 36569 INSJ PICC 5 YR+ W/O IMAGING: CPT | Performed by: RADIOLOGY

## 2018-01-01 PROCEDURE — G8987 SELF CARE CURRENT STATUS: HCPCS

## 2018-01-01 PROCEDURE — 92610 EVALUATE SWALLOWING FUNCTION: CPT | Performed by: SPEECH-LANGUAGE PATHOLOGIST

## 2018-01-01 PROCEDURE — 99213 OFFICE O/P EST LOW 20 MIN: CPT | Performed by: PHYSICIAN ASSISTANT

## 2018-01-01 PROCEDURE — 87070 CULTURE OTHR SPECIMN AEROBIC: CPT | Performed by: SURGERY

## 2018-01-01 PROCEDURE — 86900 BLOOD TYPING SEROLOGIC ABO: CPT | Performed by: EMERGENCY MEDICINE

## 2018-01-01 PROCEDURE — 87040 BLOOD CULTURE FOR BACTERIA: CPT | Performed by: PHYSICIAN ASSISTANT

## 2018-01-01 PROCEDURE — 85027 COMPLETE CBC AUTOMATED: CPT | Performed by: FAMILY MEDICINE

## 2018-01-01 PROCEDURE — 83036 HEMOGLOBIN GLYCOSYLATED A1C: CPT | Performed by: PHYSICIAN ASSISTANT

## 2018-01-01 PROCEDURE — 36569 INSJ PICC 5 YR+ W/O IMAGING: CPT

## 2018-01-01 PROCEDURE — 87186 SC STD MICRODIL/AGAR DIL: CPT | Performed by: PHYSICIAN ASSISTANT

## 2018-01-01 PROCEDURE — 76937 US GUIDE VASCULAR ACCESS: CPT | Performed by: RADIOLOGY

## 2018-01-01 PROCEDURE — 99232 SBSQ HOSP IP/OBS MODERATE 35: CPT

## 2018-01-01 PROCEDURE — 87147 CULTURE TYPE IMMUNOLOGIC: CPT | Performed by: PHYSICIAN ASSISTANT

## 2018-01-01 PROCEDURE — 83735 ASSAY OF MAGNESIUM: CPT | Performed by: PHYSICIAN ASSISTANT

## 2018-01-01 PROCEDURE — 99233 SBSQ HOSP IP/OBS HIGH 50: CPT | Performed by: INTERNAL MEDICINE

## 2018-01-01 PROCEDURE — 02H633Z INSERTION OF INFUSION DEVICE INTO RIGHT ATRIUM, PERCUTANEOUS APPROACH: ICD-10-PCS | Performed by: INTERNAL MEDICINE

## 2018-01-01 RX ORDER — ALBUTEROL SULFATE 2.5 MG/3ML
5 SOLUTION RESPIRATORY (INHALATION) ONCE
Status: COMPLETED | OUTPATIENT
Start: 2018-01-01 | End: 2018-01-01

## 2018-01-01 RX ORDER — PREDNISONE 10 MG/1
40 TABLET ORAL DAILY
Qty: 16 TABLET | Refills: 0 | Status: SHIPPED | OUTPATIENT
Start: 2018-01-01 | End: 2018-01-01

## 2018-01-01 RX ORDER — MAGNESIUM HYDROXIDE 1200 MG/15ML
LIQUID ORAL AS NEEDED
Status: DISCONTINUED | OUTPATIENT
Start: 2018-01-01 | End: 2018-01-01 | Stop reason: HOSPADM

## 2018-01-01 RX ORDER — LIDOCAINE HYDROCHLORIDE 10 MG/ML
10 INJECTION, SOLUTION INFILTRATION; PERINEURAL ONCE
Status: COMPLETED | OUTPATIENT
Start: 2018-01-01 | End: 2018-01-01

## 2018-01-01 RX ORDER — LIDOCAINE HYDROCHLORIDE AND EPINEPHRINE 10; 10 MG/ML; UG/ML
20 INJECTION, SOLUTION INFILTRATION; PERINEURAL ONCE
Status: DISCONTINUED | OUTPATIENT
Start: 2018-01-01 | End: 2018-01-01 | Stop reason: HOSPADM

## 2018-01-01 RX ORDER — ALBUTEROL SULFATE 90 UG/1
2 AEROSOL, METERED RESPIRATORY (INHALATION) EVERY 4 HOURS PRN
Qty: 1 INHALER | Refills: 1 | Status: SHIPPED | OUTPATIENT
Start: 2018-01-01 | End: 2019-01-01 | Stop reason: HOSPADM

## 2018-01-01 RX ORDER — ALENDRONATE SODIUM 70 MG/1
TABLET ORAL
COMMUNITY
End: 2019-01-01 | Stop reason: HOSPADM

## 2018-01-01 RX ORDER — AMOXICILLIN 250 MG
1 CAPSULE ORAL 2 TIMES DAILY
Qty: 60 TABLET | Refills: 0 | Status: SHIPPED | OUTPATIENT
Start: 2018-01-01 | End: 2019-01-01 | Stop reason: HOSPADM

## 2018-01-01 RX ORDER — METOPROLOL TARTRATE 50 MG/1
50 TABLET, FILM COATED ORAL EVERY 12 HOURS SCHEDULED
Status: DISCONTINUED | OUTPATIENT
Start: 2018-01-01 | End: 2018-01-01 | Stop reason: HOSPADM

## 2018-01-01 RX ORDER — VALSARTAN 80 MG/1
80 TABLET ORAL 2 TIMES DAILY
Status: DISCONTINUED | OUTPATIENT
Start: 2018-01-01 | End: 2018-01-01

## 2018-01-01 RX ORDER — DOXAZOSIN MESYLATE 4 MG/1
4 TABLET ORAL ONCE
Status: COMPLETED | OUTPATIENT
Start: 2018-01-01 | End: 2018-01-01

## 2018-01-01 RX ORDER — ATORVASTATIN CALCIUM 10 MG/1
20 TABLET, FILM COATED ORAL
Status: DISCONTINUED | OUTPATIENT
Start: 2018-01-01 | End: 2018-01-01 | Stop reason: HOSPADM

## 2018-01-01 RX ORDER — SODIUM CHLORIDE 9 MG/ML
75 INJECTION, SOLUTION INTRAVENOUS CONTINUOUS
Status: DISCONTINUED | OUTPATIENT
Start: 2018-01-01 | End: 2018-01-01

## 2018-01-01 RX ORDER — HYDRALAZINE HYDROCHLORIDE 20 MG/ML
5 INJECTION INTRAMUSCULAR; INTRAVENOUS ONCE
Status: COMPLETED | OUTPATIENT
Start: 2018-01-01 | End: 2018-01-01

## 2018-01-01 RX ORDER — ALBUTEROL SULFATE 90 UG/1
2 AEROSOL, METERED RESPIRATORY (INHALATION) EVERY 2 HOUR PRN
Status: DISCONTINUED | OUTPATIENT
Start: 2018-01-01 | End: 2018-01-01 | Stop reason: HOSPADM

## 2018-01-01 RX ORDER — POTASSIUM CHLORIDE 20MEQ/15ML
40 LIQUID (ML) ORAL ONCE
Status: COMPLETED | OUTPATIENT
Start: 2018-01-01 | End: 2018-01-01

## 2018-01-01 RX ORDER — FERROUS SULFATE 220 (44)/5
300 ELIXIR ORAL DAILY
Status: DISCONTINUED | OUTPATIENT
Start: 2018-01-01 | End: 2018-01-01 | Stop reason: HOSPADM

## 2018-01-01 RX ORDER — AMLODIPINE BESYLATE 5 MG/1
5 TABLET ORAL DAILY
Qty: 30 TABLET | Refills: 0 | Status: SHIPPED | OUTPATIENT
Start: 2018-01-01 | End: 2019-01-01 | Stop reason: HOSPADM

## 2018-01-01 RX ORDER — ASCORBIC ACID 500 MG
500 TABLET ORAL DAILY
Status: DISCONTINUED | OUTPATIENT
Start: 2018-01-01 | End: 2018-01-01 | Stop reason: HOSPADM

## 2018-01-01 RX ORDER — SODIUM CHLORIDE, SODIUM LACTATE, POTASSIUM CHLORIDE, CALCIUM CHLORIDE 600; 310; 30; 20 MG/100ML; MG/100ML; MG/100ML; MG/100ML
INJECTION, SOLUTION INTRAVENOUS CONTINUOUS PRN
Status: DISCONTINUED | OUTPATIENT
Start: 2018-01-01 | End: 2018-01-01 | Stop reason: SURG

## 2018-01-01 RX ORDER — MAGNESIUM SULFATE 1 G/100ML
1 INJECTION INTRAVENOUS ONCE
Status: COMPLETED | OUTPATIENT
Start: 2018-01-01 | End: 2018-01-01

## 2018-01-01 RX ORDER — FERROUS SULFATE 220 (44)/5
300 ELIXIR ORAL DAILY
Qty: 150 ML | Refills: 0 | Status: SHIPPED | OUTPATIENT
Start: 2018-01-01 | End: 2019-01-01 | Stop reason: HOSPADM

## 2018-01-01 RX ORDER — VALSARTAN 80 MG/1
40 TABLET ORAL DAILY
Status: DISCONTINUED | OUTPATIENT
Start: 2018-01-01 | End: 2018-01-01

## 2018-01-01 RX ORDER — PROPOFOL 10 MG/ML
INJECTION, EMULSION INTRAVENOUS AS NEEDED
Status: DISCONTINUED | OUTPATIENT
Start: 2018-01-01 | End: 2018-01-01 | Stop reason: SURG

## 2018-01-01 RX ORDER — SODIUM CHLORIDE 9 MG/ML
100 INJECTION, SOLUTION INTRAVENOUS CONTINUOUS
Status: DISCONTINUED | OUTPATIENT
Start: 2018-01-01 | End: 2018-01-01

## 2018-01-01 RX ORDER — DOXAZOSIN MESYLATE 4 MG/1
4 TABLET ORAL
Status: DISCONTINUED | OUTPATIENT
Start: 2018-01-01 | End: 2018-01-01 | Stop reason: HOSPADM

## 2018-01-01 RX ORDER — FUROSEMIDE 20 MG/1
20 TABLET ORAL 2 TIMES DAILY
COMMUNITY
End: 2019-01-01 | Stop reason: HOSPADM

## 2018-01-01 RX ORDER — BUPIVACAINE HYDROCHLORIDE 5 MG/ML
INJECTION, SOLUTION PERINEURAL AS NEEDED
Status: DISCONTINUED | OUTPATIENT
Start: 2018-01-01 | End: 2018-01-01 | Stop reason: HOSPADM

## 2018-01-01 RX ORDER — AMLODIPINE BESYLATE 5 MG/1
5 TABLET ORAL DAILY
Status: DISCONTINUED | OUTPATIENT
Start: 2018-01-01 | End: 2018-01-01 | Stop reason: HOSPADM

## 2018-01-01 RX ORDER — FENTANYL CITRATE 50 UG/ML
INJECTION, SOLUTION INTRAMUSCULAR; INTRAVENOUS AS NEEDED
Status: DISCONTINUED | OUTPATIENT
Start: 2018-01-01 | End: 2018-01-01 | Stop reason: SURG

## 2018-01-01 RX ORDER — SODIUM CHLORIDE, SODIUM LACTATE, POTASSIUM CHLORIDE, CALCIUM CHLORIDE 600; 310; 30; 20 MG/100ML; MG/100ML; MG/100ML; MG/100ML
75 INJECTION, SOLUTION INTRAVENOUS CONTINUOUS
Status: DISCONTINUED | OUTPATIENT
Start: 2018-01-01 | End: 2018-01-01

## 2018-01-01 RX ORDER — MULTIVITAMIN
1 CAPSULE ORAL DAILY
COMMUNITY
End: 2019-01-01 | Stop reason: HOSPADM

## 2018-01-01 RX ORDER — OXYBUTYNIN CHLORIDE 5 MG/1
5 TABLET ORAL DAILY
Status: DISCONTINUED | OUTPATIENT
Start: 2018-01-01 | End: 2018-01-01 | Stop reason: HOSPADM

## 2018-01-01 RX ORDER — ATORVASTATIN CALCIUM 20 MG/1
TABLET, FILM COATED ORAL
COMMUNITY
Start: 2018-01-01 | End: 2019-01-01 | Stop reason: HOSPADM

## 2018-01-01 RX ORDER — VALSARTAN 160 MG/1
160 TABLET ORAL 2 TIMES DAILY
Qty: 60 TABLET | Refills: 0 | Status: SHIPPED | OUTPATIENT
Start: 2018-01-01 | End: 2019-01-01 | Stop reason: HOSPADM

## 2018-01-01 RX ORDER — ASCORBIC ACID 500 MG
500 TABLET ORAL DAILY
Qty: 30 TABLET | Refills: 0 | Status: SHIPPED | OUTPATIENT
Start: 2018-01-01 | End: 2019-01-01 | Stop reason: HOSPADM

## 2018-01-01 RX ORDER — OXYBUTYNIN CHLORIDE 5 MG/1
TABLET, EXTENDED RELEASE ORAL
COMMUNITY
Start: 2018-01-01 | End: 2018-01-01

## 2018-01-01 RX ORDER — ONDANSETRON 2 MG/ML
4 INJECTION INTRAMUSCULAR; INTRAVENOUS EVERY 6 HOURS PRN
Status: DISCONTINUED | OUTPATIENT
Start: 2018-01-01 | End: 2018-01-01 | Stop reason: HOSPADM

## 2018-01-01 RX ORDER — POTASSIUM CHLORIDE 20 MEQ/1
20 TABLET, EXTENDED RELEASE ORAL
Status: COMPLETED | OUTPATIENT
Start: 2018-01-01 | End: 2018-01-01

## 2018-01-01 RX ORDER — VALSARTAN 80 MG/1
160 TABLET ORAL 2 TIMES DAILY
Status: DISCONTINUED | OUTPATIENT
Start: 2018-01-01 | End: 2018-01-01 | Stop reason: HOSPADM

## 2018-01-01 RX ORDER — METOPROLOL TARTRATE 50 MG/1
50 TABLET, FILM COATED ORAL EVERY 12 HOURS SCHEDULED
Status: DISCONTINUED | OUTPATIENT
Start: 2018-01-01 | End: 2018-01-01

## 2018-01-01 RX ORDER — METOPROLOL TARTRATE 100 MG/1
100 TABLET ORAL EVERY 12 HOURS SCHEDULED
Status: DISCONTINUED | OUTPATIENT
Start: 2018-01-01 | End: 2018-01-01

## 2018-01-01 RX ORDER — ACETAMINOPHEN 325 MG/1
650 TABLET ORAL EVERY 6 HOURS PRN
Status: DISCONTINUED | OUTPATIENT
Start: 2018-01-01 | End: 2018-01-01 | Stop reason: HOSPADM

## 2018-01-01 RX ORDER — AMOXICILLIN 250 MG
1 CAPSULE ORAL 2 TIMES DAILY
Status: DISCONTINUED | OUTPATIENT
Start: 2018-01-01 | End: 2018-01-01 | Stop reason: HOSPADM

## 2018-01-01 RX ORDER — PANTOPRAZOLE SODIUM 40 MG/1
40 TABLET, DELAYED RELEASE ORAL
Status: DISCONTINUED | OUTPATIENT
Start: 2018-01-01 | End: 2018-01-01 | Stop reason: HOSPADM

## 2018-01-01 RX ORDER — ONDANSETRON 2 MG/ML
4 INJECTION INTRAMUSCULAR; INTRAVENOUS ONCE AS NEEDED
Status: DISCONTINUED | OUTPATIENT
Start: 2018-01-01 | End: 2018-01-01 | Stop reason: HOSPADM

## 2018-01-01 RX ORDER — ZINC SULFATE 50(220)MG
220 CAPSULE ORAL DAILY
COMMUNITY
End: 2019-01-01 | Stop reason: HOSPADM

## 2018-01-01 RX ORDER — LIDOCAINE HYDROCHLORIDE 10 MG/ML
INJECTION, SOLUTION EPIDURAL; INFILTRATION; INTRACAUDAL; PERINEURAL AS NEEDED
Status: DISCONTINUED | OUTPATIENT
Start: 2018-01-01 | End: 2018-01-01 | Stop reason: SURG

## 2018-01-01 RX ORDER — PREDNISONE 20 MG/1
40 TABLET ORAL ONCE
Status: COMPLETED | OUTPATIENT
Start: 2018-01-01 | End: 2018-01-01

## 2018-01-01 RX ADMIN — FENTANYL CITRATE 25 MCG: 50 INJECTION, SOLUTION INTRAMUSCULAR; INTRAVENOUS at 12:40

## 2018-01-01 RX ADMIN — VALSARTAN 160 MG: 80 TABLET, FILM COATED ORAL at 08:25

## 2018-01-01 RX ADMIN — OXYCODONE HYDROCHLORIDE AND ACETAMINOPHEN 500 MG: 500 TABLET ORAL at 09:46

## 2018-01-01 RX ADMIN — SODIUM CHLORIDE, SODIUM LACTATE, POTASSIUM CHLORIDE, AND CALCIUM CHLORIDE: .6; .31; .03; .02 INJECTION, SOLUTION INTRAVENOUS at 12:36

## 2018-01-01 RX ADMIN — ALBUTEROL SULFATE 5 MG: 2.5 SOLUTION RESPIRATORY (INHALATION) at 18:46

## 2018-01-01 RX ADMIN — Medication 1 TABLET: at 17:42

## 2018-01-01 RX ADMIN — Medication 1 TABLET: at 17:51

## 2018-01-01 RX ADMIN — PROPOFOL 20 MG: 10 INJECTION, EMULSION INTRAVENOUS at 12:50

## 2018-01-01 RX ADMIN — OXYCODONE HYDROCHLORIDE AND ACETAMINOPHEN 500 MG: 500 TABLET ORAL at 08:25

## 2018-01-01 RX ADMIN — PANTOPRAZOLE SODIUM 40 MG: 40 TABLET, DELAYED RELEASE ORAL at 05:28

## 2018-01-01 RX ADMIN — PROPOFOL 20 MG: 10 INJECTION, EMULSION INTRAVENOUS at 12:52

## 2018-01-01 RX ADMIN — POTASSIUM CHLORIDE 40 MEQ: 20 SOLUTION ORAL at 11:41

## 2018-01-01 RX ADMIN — ANORECTAL OINTMENT 1 APPLICATION: 15.7; .44; 24; 20.6 OINTMENT TOPICAL at 17:41

## 2018-01-01 RX ADMIN — OXYCODONE HYDROCHLORIDE AND ACETAMINOPHEN 500 MG: 500 TABLET ORAL at 11:40

## 2018-01-01 RX ADMIN — PANTOPRAZOLE SODIUM 40 MG: 40 TABLET, DELAYED RELEASE ORAL at 16:25

## 2018-01-01 RX ADMIN — METOPROLOL TARTRATE 50 MG: 50 TABLET, FILM COATED ORAL at 21:23

## 2018-01-01 RX ADMIN — OXYBUTYNIN CHLORIDE 5 MG: 5 TABLET ORAL at 08:11

## 2018-01-01 RX ADMIN — PANTOPRAZOLE SODIUM 40 MG: 40 TABLET, DELAYED RELEASE ORAL at 16:36

## 2018-01-01 RX ADMIN — VANCOMYCIN HYDROCHLORIDE 750 MG: 1 INJECTION, POWDER, LYOPHILIZED, FOR SOLUTION INTRAVENOUS at 20:32

## 2018-01-01 RX ADMIN — AMLODIPINE BESYLATE 5 MG: 5 TABLET ORAL at 09:46

## 2018-01-01 RX ADMIN — HYDROCORTISONE: 25 CREAM TOPICAL at 09:08

## 2018-01-01 RX ADMIN — DOXAZOSIN 4 MG: 4 TABLET ORAL at 21:07

## 2018-01-01 RX ADMIN — OXYBUTYNIN CHLORIDE 5 MG: 5 TABLET ORAL at 09:46

## 2018-01-01 RX ADMIN — METOPROLOL TARTRATE 50 MG: 50 TABLET ORAL at 08:54

## 2018-01-01 RX ADMIN — ANORECTAL OINTMENT: 15.7; .44; 24; 20.6 OINTMENT TOPICAL at 09:40

## 2018-01-01 RX ADMIN — PANTOPRAZOLE SODIUM 40 MG: 40 TABLET, DELAYED RELEASE ORAL at 05:15

## 2018-01-01 RX ADMIN — SODIUM CHLORIDE 125 ML/HR: 0.9 INJECTION, SOLUTION INTRAVENOUS at 07:20

## 2018-01-01 RX ADMIN — Medication 1 TABLET: at 09:46

## 2018-01-01 RX ADMIN — METOPROLOL TARTRATE 50 MG: 50 TABLET ORAL at 08:25

## 2018-01-01 RX ADMIN — ANORECTAL OINTMENT: 15.7; .44; 24; 20.6 OINTMENT TOPICAL at 08:54

## 2018-01-01 RX ADMIN — PANTOPRAZOLE SODIUM 40 MG: 40 TABLET, DELAYED RELEASE ORAL at 05:19

## 2018-01-01 RX ADMIN — ANORECTAL OINTMENT 1 APPLICATION: 15.7; .44; 24; 20.6 OINTMENT TOPICAL at 08:20

## 2018-01-01 RX ADMIN — ANORECTAL OINTMENT 1 APPLICATION: 15.7; .44; 24; 20.6 OINTMENT TOPICAL at 08:36

## 2018-01-01 RX ADMIN — LIDOCAINE HYDROCHLORIDE 10 ML: 10 INJECTION, SOLUTION INFILTRATION; PERINEURAL at 12:40

## 2018-01-01 RX ADMIN — OXYBUTYNIN CHLORIDE 5 MG: 5 TABLET ORAL at 08:25

## 2018-01-01 RX ADMIN — HYDROCORTISONE 1 APPLICATION: 25 CREAM TOPICAL at 08:31

## 2018-01-01 RX ADMIN — CEFAZOLIN SODIUM 2000 MG: 2 SOLUTION INTRAVENOUS at 17:02

## 2018-01-01 RX ADMIN — OXYBUTYNIN CHLORIDE 5 MG: 5 TABLET ORAL at 09:39

## 2018-01-01 RX ADMIN — CEFAZOLIN SODIUM 2000 MG: 2 SOLUTION INTRAVENOUS at 08:07

## 2018-01-01 RX ADMIN — CEFAZOLIN SODIUM 2000 MG: 2 SOLUTION INTRAVENOUS at 15:45

## 2018-01-01 RX ADMIN — Medication 1 TABLET: at 17:02

## 2018-01-01 RX ADMIN — CEFAZOLIN SODIUM 2000 MG: 2 SOLUTION INTRAVENOUS at 17:01

## 2018-01-01 RX ADMIN — METOPROLOL TARTRATE 50 MG: 50 TABLET, FILM COATED ORAL at 08:33

## 2018-01-01 RX ADMIN — ATORVASTATIN CALCIUM 20 MG: 10 TABLET, FILM COATED ORAL at 16:36

## 2018-01-01 RX ADMIN — ANORECTAL OINTMENT 1 APPLICATION: 15.7; .44; 24; 20.6 OINTMENT TOPICAL at 17:36

## 2018-01-01 RX ADMIN — Medication 300 MG: at 09:48

## 2018-01-01 RX ADMIN — OXYCODONE HYDROCHLORIDE AND ACETAMINOPHEN 500 MG: 500 TABLET ORAL at 08:54

## 2018-01-01 RX ADMIN — PROPOFOL 50 MG: 10 INJECTION, EMULSION INTRAVENOUS at 12:40

## 2018-01-01 RX ADMIN — PANTOPRAZOLE SODIUM 40 MG: 40 TABLET, DELAYED RELEASE ORAL at 05:21

## 2018-01-01 RX ADMIN — PROPOFOL 20 MG: 10 INJECTION, EMULSION INTRAVENOUS at 12:43

## 2018-01-01 RX ADMIN — CEFAZOLIN SODIUM 2000 MG: 2 SOLUTION INTRAVENOUS at 00:03

## 2018-01-01 RX ADMIN — CEFAZOLIN SODIUM 2000 MG: 2 SOLUTION INTRAVENOUS at 02:17

## 2018-01-01 RX ADMIN — VALSARTAN 160 MG: 80 TABLET, FILM COATED ORAL at 09:46

## 2018-01-01 RX ADMIN — VALSARTAN 80 MG: 80 TABLET, FILM COATED ORAL at 17:03

## 2018-01-01 RX ADMIN — ANORECTAL OINTMENT: 15.7; .44; 24; 20.6 OINTMENT TOPICAL at 09:57

## 2018-01-01 RX ADMIN — METOPROLOL TARTRATE 50 MG: 50 TABLET ORAL at 09:46

## 2018-01-01 RX ADMIN — PANTOPRAZOLE SODIUM 40 MG: 40 TABLET, DELAYED RELEASE ORAL at 15:50

## 2018-01-01 RX ADMIN — ANORECTAL OINTMENT: 15.7; .44; 24; 20.6 OINTMENT TOPICAL at 18:23

## 2018-01-01 RX ADMIN — DOXAZOSIN 4 MG: 4 TABLET ORAL at 00:42

## 2018-01-01 RX ADMIN — Medication 1 TABLET: at 08:19

## 2018-01-01 RX ADMIN — PREDNISONE 40 MG: 20 TABLET ORAL at 18:45

## 2018-01-01 RX ADMIN — HYDROCORTISONE: 25 CREAM TOPICAL at 08:10

## 2018-01-01 RX ADMIN — ACETAMINOPHEN 650 MG: 325 TABLET ORAL at 21:46

## 2018-01-01 RX ADMIN — HYDROCORTISONE: 25 CREAM TOPICAL at 09:58

## 2018-01-01 RX ADMIN — METOPROLOL TARTRATE 50 MG: 50 TABLET ORAL at 21:29

## 2018-01-01 RX ADMIN — OXYBUTYNIN CHLORIDE 5 MG: 5 TABLET ORAL at 08:54

## 2018-01-01 RX ADMIN — ATORVASTATIN CALCIUM 20 MG: 10 TABLET, FILM COATED ORAL at 16:25

## 2018-01-01 RX ADMIN — METOPROLOL TARTRATE 50 MG: 50 TABLET, FILM COATED ORAL at 08:19

## 2018-01-01 RX ADMIN — ANORECTAL OINTMENT 1 APPLICATION: 15.7; .44; 24; 20.6 OINTMENT TOPICAL at 17:13

## 2018-01-01 RX ADMIN — CEFAZOLIN SODIUM 2000 MG: 2 SOLUTION INTRAVENOUS at 23:49

## 2018-01-01 RX ADMIN — VALSARTAN 160 MG: 80 TABLET, FILM COATED ORAL at 18:12

## 2018-01-01 RX ADMIN — PANTOPRAZOLE SODIUM 40 MG: 40 TABLET, DELAYED RELEASE ORAL at 16:21

## 2018-01-01 RX ADMIN — ANORECTAL OINTMENT: 15.7; .44; 24; 20.6 OINTMENT TOPICAL at 08:08

## 2018-01-01 RX ADMIN — OXYBUTYNIN CHLORIDE 5 MG: 5 TABLET ORAL at 08:30

## 2018-01-01 RX ADMIN — ANORECTAL OINTMENT: 15.7; .44; 24; 20.6 OINTMENT TOPICAL at 17:02

## 2018-01-01 RX ADMIN — PANTOPRAZOLE SODIUM 40 MG: 40 TABLET, DELAYED RELEASE ORAL at 05:12

## 2018-01-01 RX ADMIN — POTASSIUM CHLORIDE 20 MEQ: 1500 TABLET, EXTENDED RELEASE ORAL at 15:06

## 2018-01-01 RX ADMIN — HYDROCORTISONE 1 APPLICATION: 25 CREAM TOPICAL at 17:36

## 2018-01-01 RX ADMIN — DOXAZOSIN 4 MG: 4 TABLET ORAL at 22:26

## 2018-01-01 RX ADMIN — METOPROLOL TARTRATE 50 MG: 50 TABLET ORAL at 20:45

## 2018-01-01 RX ADMIN — SODIUM CHLORIDE 100 ML/HR: 0.9 INJECTION, SOLUTION INTRAVENOUS at 12:15

## 2018-01-01 RX ADMIN — FENTANYL CITRATE 25 MCG: 50 INJECTION, SOLUTION INTRAMUSCULAR; INTRAVENOUS at 12:45

## 2018-01-01 RX ADMIN — VALSARTAN 160 MG: 80 TABLET, FILM COATED ORAL at 08:54

## 2018-01-01 RX ADMIN — ANORECTAL OINTMENT: 15.7; .44; 24; 20.6 OINTMENT TOPICAL at 17:51

## 2018-01-01 RX ADMIN — ANORECTAL OINTMENT: 15.7; .44; 24; 20.6 OINTMENT TOPICAL at 19:49

## 2018-01-01 RX ADMIN — POTASSIUM CHLORIDE 20 MEQ: 1500 TABLET, EXTENDED RELEASE ORAL at 08:54

## 2018-01-01 RX ADMIN — AMLODIPINE BESYLATE 5 MG: 5 TABLET ORAL at 08:25

## 2018-01-01 RX ADMIN — HYDROCORTISONE 1 APPLICATION: 25 CREAM TOPICAL at 17:03

## 2018-01-01 RX ADMIN — CEFAZOLIN SODIUM 2000 MG: 2 SOLUTION INTRAVENOUS at 15:50

## 2018-01-01 RX ADMIN — HYDROCORTISONE: 25 CREAM TOPICAL at 18:26

## 2018-01-01 RX ADMIN — ANORECTAL OINTMENT: 15.7; .44; 24; 20.6 OINTMENT TOPICAL at 18:12

## 2018-01-01 RX ADMIN — PANTOPRAZOLE SODIUM 40 MG: 40 TABLET, DELAYED RELEASE ORAL at 17:02

## 2018-01-01 RX ADMIN — CEFAZOLIN SODIUM 2000 MG: 2 SOLUTION INTRAVENOUS at 09:49

## 2018-01-01 RX ADMIN — HYDROCORTISONE 1 APPLICATION: 25 CREAM TOPICAL at 08:20

## 2018-01-01 RX ADMIN — PROPOFOL 20 MG: 10 INJECTION, EMULSION INTRAVENOUS at 12:55

## 2018-01-01 RX ADMIN — Medication 1 TABLET: at 08:32

## 2018-01-01 RX ADMIN — SODIUM CHLORIDE 100 ML/HR: 0.9 INJECTION, SOLUTION INTRAVENOUS at 02:28

## 2018-01-01 RX ADMIN — VALSARTAN 160 MG: 80 TABLET, FILM COATED ORAL at 17:52

## 2018-01-01 RX ADMIN — HYDROCORTISONE: 25 CREAM TOPICAL at 17:51

## 2018-01-01 RX ADMIN — HYDROCORTISONE: 25 CREAM TOPICAL at 17:41

## 2018-01-01 RX ADMIN — DOXAZOSIN 4 MG: 4 TABLET ORAL at 21:24

## 2018-01-01 RX ADMIN — ATORVASTATIN CALCIUM 20 MG: 10 TABLET, FILM COATED ORAL at 17:02

## 2018-01-01 RX ADMIN — SODIUM CHLORIDE 125 ML/HR: 0.9 INJECTION, SOLUTION INTRAVENOUS at 20:01

## 2018-01-01 RX ADMIN — ATORVASTATIN CALCIUM 20 MG: 10 TABLET, FILM COATED ORAL at 15:52

## 2018-01-01 RX ADMIN — MAGNESIUM SULFATE HEPTAHYDRATE 1 G: 1 INJECTION, SOLUTION INTRAVENOUS at 11:41

## 2018-01-01 RX ADMIN — METOPROLOL TARTRATE 50 MG: 50 TABLET, FILM COATED ORAL at 21:01

## 2018-01-01 RX ADMIN — CEFAZOLIN SODIUM 2000 MG: 2 SOLUTION INTRAVENOUS at 08:29

## 2018-01-01 RX ADMIN — Medication 1 TABLET: at 08:30

## 2018-01-01 RX ADMIN — CEFAZOLIN SODIUM 2000 MG: 2 SOLUTION INTRAVENOUS at 08:28

## 2018-01-01 RX ADMIN — METOPROLOL TARTRATE 50 MG: 50 TABLET, FILM COATED ORAL at 08:10

## 2018-01-01 RX ADMIN — HYDROCORTISONE: 25 CREAM TOPICAL at 12:38

## 2018-01-01 RX ADMIN — OXYCODONE HYDROCHLORIDE AND ACETAMINOPHEN 500 MG: 500 TABLET ORAL at 08:07

## 2018-01-01 RX ADMIN — DOXAZOSIN 4 MG: 4 TABLET ORAL at 21:01

## 2018-01-01 RX ADMIN — METOPROLOL TARTRATE 50 MG: 50 TABLET, FILM COATED ORAL at 08:30

## 2018-01-01 RX ADMIN — Medication 300 MG: at 08:30

## 2018-01-01 RX ADMIN — Medication 1 TABLET: at 08:54

## 2018-01-01 RX ADMIN — METOPROLOL TARTRATE 50 MG: 50 TABLET, FILM COATED ORAL at 21:18

## 2018-01-01 RX ADMIN — Medication 1 TABLET: at 18:12

## 2018-01-01 RX ADMIN — ATORVASTATIN CALCIUM 20 MG: 10 TABLET, FILM COATED ORAL at 16:21

## 2018-01-01 RX ADMIN — CEFAZOLIN SODIUM 2000 MG: 2 SOLUTION INTRAVENOUS at 17:30

## 2018-01-01 RX ADMIN — ATORVASTATIN CALCIUM 20 MG: 10 TABLET, FILM COATED ORAL at 15:45

## 2018-01-01 RX ADMIN — METOPROLOL TARTRATE 50 MG: 50 TABLET, FILM COATED ORAL at 21:14

## 2018-01-01 RX ADMIN — PANTOPRAZOLE SODIUM 40 MG: 40 TABLET, DELAYED RELEASE ORAL at 05:01

## 2018-01-01 RX ADMIN — DOXAZOSIN 4 MG: 4 TABLET ORAL at 21:29

## 2018-01-01 RX ADMIN — CEFAZOLIN SODIUM 2000 MG: 2 SOLUTION INTRAVENOUS at 23:50

## 2018-01-01 RX ADMIN — ATORVASTATIN CALCIUM 20 MG: 10 TABLET, FILM COATED ORAL at 17:30

## 2018-01-01 RX ADMIN — METOPROLOL TARTRATE 50 MG: 50 TABLET, FILM COATED ORAL at 09:39

## 2018-01-01 RX ADMIN — HYDROCORTISONE 1 APPLICATION: 25 CREAM TOPICAL at 08:08

## 2018-01-01 RX ADMIN — SODIUM CHLORIDE 75 ML/HR: 0.9 INJECTION, SOLUTION INTRAVENOUS at 02:57

## 2018-01-01 RX ADMIN — PANTOPRAZOLE SODIUM 40 MG: 40 TABLET, DELAYED RELEASE ORAL at 06:08

## 2018-01-01 RX ADMIN — HYDROCORTISONE: 25 CREAM TOPICAL at 18:13

## 2018-01-01 RX ADMIN — SODIUM CHLORIDE 500 ML: 0.9 INJECTION, SOLUTION INTRAVENOUS at 14:29

## 2018-01-01 RX ADMIN — Medication 1 TABLET: at 08:24

## 2018-01-01 RX ADMIN — METOPROLOL TARTRATE 50 MG: 50 TABLET ORAL at 21:03

## 2018-01-01 RX ADMIN — CEFAZOLIN SODIUM 2000 MG: 2 SOLUTION INTRAVENOUS at 16:08

## 2018-01-01 RX ADMIN — SODIUM CHLORIDE 100 ML/HR: 0.9 INJECTION, SOLUTION INTRAVENOUS at 19:24

## 2018-01-01 RX ADMIN — VALSARTAN 80 MG: 80 TABLET, FILM COATED ORAL at 08:08

## 2018-01-01 RX ADMIN — Medication 300 MG: at 08:07

## 2018-01-01 RX ADMIN — VALSARTAN 160 MG: 80 TABLET, FILM COATED ORAL at 17:31

## 2018-01-01 RX ADMIN — PANTOPRAZOLE SODIUM 40 MG: 40 TABLET, DELAYED RELEASE ORAL at 16:08

## 2018-01-01 RX ADMIN — LIDOCAINE HYDROCHLORIDE 50 MG: 10 INJECTION, SOLUTION EPIDURAL; INFILTRATION; INTRACAUDAL; PERINEURAL at 12:40

## 2018-01-01 RX ADMIN — DOXAZOSIN 4 MG: 4 TABLET ORAL at 21:47

## 2018-01-01 RX ADMIN — OXYBUTYNIN CHLORIDE 5 MG: 5 TABLET ORAL at 08:19

## 2018-01-01 RX ADMIN — OXYCODONE HYDROCHLORIDE AND ACETAMINOPHEN 500 MG: 500 TABLET ORAL at 08:29

## 2018-01-01 RX ADMIN — SODIUM CHLORIDE 1000 ML: 0.9 INJECTION, SOLUTION INTRAVENOUS at 16:21

## 2018-01-01 RX ADMIN — ANORECTAL OINTMENT: 15.7; .44; 24; 20.6 OINTMENT TOPICAL at 08:10

## 2018-01-01 RX ADMIN — METOPROLOL TARTRATE 50 MG: 50 TABLET, FILM COATED ORAL at 08:32

## 2018-01-01 RX ADMIN — HYDROCORTISONE: 25 CREAM TOPICAL at 09:40

## 2018-01-01 RX ADMIN — METOPROLOL TARTRATE 50 MG: 50 TABLET, FILM COATED ORAL at 21:07

## 2018-01-01 RX ADMIN — PANTOPRAZOLE SODIUM 40 MG: 40 TABLET, DELAYED RELEASE ORAL at 17:31

## 2018-01-01 RX ADMIN — METOPROLOL TARTRATE 50 MG: 50 TABLET, FILM COATED ORAL at 08:07

## 2018-01-01 RX ADMIN — DOXAZOSIN 4 MG: 4 TABLET ORAL at 21:04

## 2018-01-01 RX ADMIN — ATORVASTATIN CALCIUM 20 MG: 10 TABLET, FILM COATED ORAL at 16:47

## 2018-01-01 RX ADMIN — OXYBUTYNIN CHLORIDE 5 MG: 5 TABLET ORAL at 08:08

## 2018-01-01 RX ADMIN — Medication 300 MG: at 12:36

## 2018-01-01 RX ADMIN — HYDROCORTISONE: 25 CREAM TOPICAL at 08:54

## 2018-01-01 RX ADMIN — OXYBUTYNIN CHLORIDE 5 MG: 5 TABLET ORAL at 08:33

## 2018-01-01 RX ADMIN — ACETAMINOPHEN 650 MG: 325 TABLET ORAL at 23:53

## 2018-01-01 RX ADMIN — CEFAZOLIN SODIUM 2000 MG: 2 SOLUTION INTRAVENOUS at 00:00

## 2018-01-01 RX ADMIN — VALSARTAN 40 MG: 80 TABLET, FILM COATED ORAL at 08:30

## 2018-01-01 RX ADMIN — Medication 1 TABLET: at 20:37

## 2018-01-01 RX ADMIN — VANCOMYCIN HYDROCHLORIDE 750 MG: 1 INJECTION, POWDER, LYOPHILIZED, FOR SOLUTION INTRAVENOUS at 20:37

## 2018-01-01 RX ADMIN — ACETAMINOPHEN 650 MG: 325 TABLET ORAL at 17:53

## 2018-01-01 RX ADMIN — OXYBUTYNIN CHLORIDE 5 MG: 5 TABLET ORAL at 08:32

## 2018-01-01 RX ADMIN — ANORECTAL OINTMENT: 15.7; .44; 24; 20.6 OINTMENT TOPICAL at 09:07

## 2018-01-01 RX ADMIN — HYDROCORTISONE: 25 CREAM TOPICAL at 18:22

## 2018-01-01 RX ADMIN — Medication 300 MG: at 11:45

## 2018-01-01 RX ADMIN — PANTOPRAZOLE SODIUM 40 MG: 40 TABLET, DELAYED RELEASE ORAL at 05:10

## 2018-01-01 RX ADMIN — AMLODIPINE BESYLATE 5 MG: 5 TABLET ORAL at 08:54

## 2018-01-01 RX ADMIN — Medication 300 MG: at 08:55

## 2018-01-01 RX ADMIN — ANORECTAL OINTMENT: 15.7; .44; 24; 20.6 OINTMENT TOPICAL at 12:37

## 2018-01-01 RX ADMIN — ACETAMINOPHEN 650 MG: 325 TABLET ORAL at 00:21

## 2018-01-01 RX ADMIN — SODIUM CHLORIDE 100 ML/HR: 0.9 INJECTION, SOLUTION INTRAVENOUS at 16:38

## 2018-01-01 RX ADMIN — PANTOPRAZOLE SODIUM 40 MG: 40 TABLET, DELAYED RELEASE ORAL at 15:45

## 2018-01-01 RX ADMIN — METOPROLOL TARTRATE 50 MG: 50 TABLET, FILM COATED ORAL at 21:47

## 2018-01-01 RX ADMIN — ATORVASTATIN CALCIUM 20 MG: 10 TABLET, FILM COATED ORAL at 16:08

## 2018-01-01 RX ADMIN — CEFAZOLIN SODIUM 2000 MG: 2 SOLUTION INTRAVENOUS at 08:19

## 2018-01-01 RX ADMIN — Medication 1 TABLET: at 08:08

## 2018-01-01 RX ADMIN — PROPOFOL 20 MG: 10 INJECTION, EMULSION INTRAVENOUS at 12:46

## 2018-01-01 RX ADMIN — HYDROCORTISONE: 25 CREAM TOPICAL at 17:02

## 2018-01-01 RX ADMIN — PANTOPRAZOLE SODIUM 40 MG: 40 TABLET, DELAYED RELEASE ORAL at 16:47

## 2018-01-01 RX ADMIN — ACETAMINOPHEN 650 MG: 325 TABLET ORAL at 17:12

## 2018-01-01 RX ADMIN — POTASSIUM CHLORIDE 20 MEQ: 1500 TABLET, EXTENDED RELEASE ORAL at 11:47

## 2018-01-01 RX ADMIN — VALSARTAN 40 MG: 80 TABLET, FILM COATED ORAL at 11:41

## 2018-01-01 RX ADMIN — HYDRALAZINE HYDROCHLORIDE 5 MG: 20 INJECTION INTRAMUSCULAR; INTRAVENOUS at 05:46

## 2018-01-01 RX ADMIN — DOXAZOSIN 4 MG: 4 TABLET ORAL at 21:14

## 2018-01-01 RX ADMIN — DOXAZOSIN 4 MG: 4 TABLET ORAL at 22:18

## 2018-01-01 RX ADMIN — PANTOPRAZOLE SODIUM 40 MG: 40 TABLET, DELAYED RELEASE ORAL at 17:01

## 2018-01-01 RX ADMIN — ACETAMINOPHEN 650 MG: 325 TABLET ORAL at 08:21

## 2018-01-01 RX ADMIN — CEFAZOLIN SODIUM 2000 MG: 2 SOLUTION INTRAVENOUS at 13:14

## 2018-01-12 VITALS — HEART RATE: 76 BPM | DIASTOLIC BLOOD PRESSURE: 80 MMHG | SYSTOLIC BLOOD PRESSURE: 170 MMHG | RESPIRATION RATE: 16 BRPM

## 2018-01-13 VITALS
BODY MASS INDEX: 28.46 KG/M2 | SYSTOLIC BLOOD PRESSURE: 181 MMHG | WEIGHT: 123 LBS | OXYGEN SATURATION: 97 % | HEART RATE: 71 BPM | HEIGHT: 55 IN | DIASTOLIC BLOOD PRESSURE: 72 MMHG | TEMPERATURE: 98.1 F

## 2018-04-12 ENCOUNTER — APPOINTMENT (EMERGENCY)
Dept: RADIOLOGY | Facility: HOSPITAL | Age: 83
DRG: 812 | End: 2018-04-12
Payer: MEDICARE

## 2018-04-12 ENCOUNTER — APPOINTMENT (INPATIENT)
Dept: CT IMAGING | Facility: HOSPITAL | Age: 83
DRG: 812 | End: 2018-04-12
Payer: MEDICARE

## 2018-04-12 ENCOUNTER — APPOINTMENT (EMERGENCY)
Dept: CT IMAGING | Facility: HOSPITAL | Age: 83
DRG: 812 | End: 2018-04-12
Payer: MEDICARE

## 2018-04-12 ENCOUNTER — HOSPITAL ENCOUNTER (INPATIENT)
Facility: HOSPITAL | Age: 83
LOS: 4 days | Discharge: NURSING, REHAB OR LONG-TERM CARE | DRG: 812 | End: 2018-04-16
Attending: EMERGENCY MEDICINE | Admitting: FAMILY MEDICINE
Payer: MEDICARE

## 2018-04-12 ENCOUNTER — ANESTHESIA EVENT (INPATIENT)
Dept: PERIOP | Facility: HOSPITAL | Age: 83
DRG: 812 | End: 2018-04-12
Payer: MEDICARE

## 2018-04-12 DIAGNOSIS — R77.8 ELEVATED TROPONIN: ICD-10-CM

## 2018-04-12 DIAGNOSIS — D64.9 ANEMIA: ICD-10-CM

## 2018-04-12 DIAGNOSIS — S43.004S DISLOCATION OF RIGHT SHOULDER JOINT, SEQUELA: Primary | ICD-10-CM

## 2018-04-12 DIAGNOSIS — R29.6 FREQUENT FALLS: ICD-10-CM

## 2018-04-12 DIAGNOSIS — K92.2 GI BLEED: ICD-10-CM

## 2018-04-12 DIAGNOSIS — N39.0 UTI (URINARY TRACT INFECTION): ICD-10-CM

## 2018-04-12 DIAGNOSIS — S43.016A ANTERIOR SHOULDER DISLOCATION: ICD-10-CM

## 2018-04-12 PROBLEM — D62 ACUTE BLOOD LOSS ANEMIA: Status: ACTIVE | Noted: 2018-04-12

## 2018-04-12 LAB
ABO GROUP BLD: NORMAL
ALBUMIN SERPL BCP-MCNC: 2.8 G/DL (ref 3.5–5)
ALP SERPL-CCNC: 72 U/L (ref 46–116)
ALT SERPL W P-5'-P-CCNC: 14 U/L (ref 12–78)
ANION GAP BLD CALC-SCNC: 17 MMOL/L (ref 4–13)
ANION GAP SERPL CALCULATED.3IONS-SCNC: 10 MMOL/L (ref 4–13)
APTT PPP: 39 SECONDS (ref 23–35)
AST SERPL W P-5'-P-CCNC: 12 U/L (ref 5–45)
BACTERIA UR QL AUTO: ABNORMAL /HPF
BASOPHILS # BLD AUTO: 0.03 THOUSANDS/ΜL (ref 0–0.1)
BASOPHILS # BLD AUTO: 0.04 THOUSANDS/ΜL (ref 0–0.1)
BASOPHILS NFR BLD AUTO: 0 % (ref 0–1)
BASOPHILS NFR BLD AUTO: 1 % (ref 0–1)
BILIRUB SERPL-MCNC: 0.4 MG/DL (ref 0.2–1)
BILIRUB UR QL STRIP: NEGATIVE
BLD GP AB SCN SERPL QL: NEGATIVE
BUN BLD-MCNC: 49 MG/DL (ref 5–25)
BUN SERPL-MCNC: 49 MG/DL (ref 5–25)
CA-I BLD-SCNC: 1.19 MMOL/L (ref 1.12–1.32)
CALCIUM SERPL-MCNC: 8.1 MG/DL
CHLORIDE BLD-SCNC: 111 MMOL/L (ref 100–108)
CHLORIDE SERPL-SCNC: 112 MMOL/L (ref 100–108)
CLARITY UR: CLEAR
CO2 SERPL-SCNC: 23 MMOL/L (ref 21–32)
COLOR UR: YELLOW
CREAT BLD-MCNC: 1.2 MG/DL (ref 0.6–1.3)
CREAT SERPL-MCNC: 1.17 MG/DL (ref 0.6–1.3)
EOSINOPHIL # BLD AUTO: 0.01 THOUSAND/ΜL (ref 0–0.61)
EOSINOPHIL # BLD AUTO: 0.01 THOUSAND/ΜL (ref 0–0.61)
EOSINOPHIL NFR BLD AUTO: 0 % (ref 0–6)
EOSINOPHIL NFR BLD AUTO: 0 % (ref 0–6)
ERYTHROCYTE [DISTWIDTH] IN BLOOD BY AUTOMATED COUNT: 21.6 % (ref 11.6–15.1)
ERYTHROCYTE [DISTWIDTH] IN BLOOD BY AUTOMATED COUNT: 26.1 % (ref 11.6–15.1)
GFR SERPL CREATININE-BSD FRML MDRD: 42 ML/MIN/1.73SQ M
GFR SERPL CREATININE-BSD FRML MDRD: 44 ML/MIN/1.73SQ M
GLUCOSE SERPL-MCNC: 139 MG/DL (ref 65–140)
GLUCOSE SERPL-MCNC: 141 MG/DL (ref 65–140)
GLUCOSE UR STRIP-MCNC: NEGATIVE MG/DL
HCT VFR BLD AUTO: 14.5 % (ref 34.8–46.1)
HCT VFR BLD AUTO: 16.4 % (ref 34.8–46.1)
HCT VFR BLD CALC: <15 % (ref 34.8–46.1)
HGB BLD-MCNC: 3.8 G/DL (ref 11.5–15.4)
HGB BLD-MCNC: 4.8 G/DL (ref 11.5–15.4)
HGB UR QL STRIP.AUTO: ABNORMAL
INR PPP: 1.36 (ref 0.86–1.16)
KETONES UR STRIP-MCNC: NEGATIVE MG/DL
LEUKOCYTE ESTERASE UR QL STRIP: ABNORMAL
LIPASE SERPL-CCNC: 65 U/L (ref 73–393)
LYMPHOCYTES # BLD AUTO: 0.95 THOUSANDS/ΜL (ref 0.6–4.47)
LYMPHOCYTES # BLD AUTO: 0.99 THOUSANDS/ΜL (ref 0.6–4.47)
LYMPHOCYTES NFR BLD AUTO: 12 % (ref 14–44)
LYMPHOCYTES NFR BLD AUTO: 8 % (ref 14–44)
MAGNESIUM SERPL-MCNC: 2 MG/DL (ref 1.6–2.6)
MCH RBC QN AUTO: 17.4 PG (ref 26.8–34.3)
MCH RBC QN AUTO: 21.2 PG (ref 26.8–34.3)
MCHC RBC AUTO-ENTMCNC: 26.2 G/DL (ref 31.4–37.4)
MCHC RBC AUTO-ENTMCNC: 29.3 G/DL (ref 31.4–37.4)
MCV RBC AUTO: 67 FL (ref 82–98)
MCV RBC AUTO: 73 FL (ref 82–98)
MONOCYTES # BLD AUTO: 0.34 THOUSAND/ΜL (ref 0.17–1.22)
MONOCYTES # BLD AUTO: 0.42 THOUSAND/ΜL (ref 0.17–1.22)
MONOCYTES NFR BLD AUTO: 3 % (ref 4–12)
MONOCYTES NFR BLD AUTO: 4 % (ref 4–12)
NEUTROPHILS # BLD AUTO: 10.82 THOUSANDS/ΜL (ref 1.85–7.62)
NEUTROPHILS # BLD AUTO: 6.94 THOUSANDS/ΜL (ref 1.85–7.62)
NEUTS SEG NFR BLD AUTO: 83 % (ref 43–75)
NEUTS SEG NFR BLD AUTO: 89 % (ref 43–75)
NITRITE UR QL STRIP: POSITIVE
NON-SQ EPI CELLS URNS QL MICRO: ABNORMAL /HPF
PCO2 BLD: 22 MMOL/L (ref 21–32)
PH UR STRIP.AUTO: 6 [PH] (ref 4.5–8)
PLATELET # BLD AUTO: 118 THOUSANDS/UL (ref 149–390)
PLATELET # BLD AUTO: 133 THOUSANDS/UL (ref 149–390)
POTASSIUM BLD-SCNC: 4.6 MMOL/L (ref 3.5–5.3)
POTASSIUM SERPL-SCNC: 4.6 MMOL/L (ref 3.5–5.3)
PROT SERPL-MCNC: 5.9 G/DL (ref 6.4–8.2)
PROT UR STRIP-MCNC: NEGATIVE MG/DL
PROTHROMBIN TIME: 16.7 SECONDS (ref 12.1–14.4)
RBC # BLD AUTO: 2.18 MILLION/UL (ref 3.81–5.12)
RBC # BLD AUTO: 2.26 MILLION/UL (ref 3.81–5.12)
RBC #/AREA URNS AUTO: ABNORMAL /HPF
RH BLD: POSITIVE
SODIUM BLD-SCNC: 145 MMOL/L (ref 136–145)
SODIUM SERPL-SCNC: 145 MMOL/L (ref 136–145)
SP GR UR STRIP.AUTO: 1.01 (ref 1–1.03)
SPECIMEN EXPIRATION DATE: NORMAL
SPECIMEN SOURCE: ABNORMAL
TROPONIN I SERPL-MCNC: 0.02 NG/ML
TROPONIN I SERPL-MCNC: 0.04 NG/ML
TSH SERPL DL<=0.05 MIU/L-ACNC: 0.98 UIU/ML (ref 0.36–3.74)
UROBILINOGEN UR QL STRIP.AUTO: 0.2 E.U./DL
WBC # BLD AUTO: 12.27 THOUSAND/UL (ref 4.31–10.16)
WBC # BLD AUTO: 8.28 THOUSAND/UL (ref 4.31–10.16)
WBC #/AREA URNS AUTO: ABNORMAL /HPF

## 2018-04-12 PROCEDURE — 86900 BLOOD TYPING SEROLOGIC ABO: CPT | Performed by: EMERGENCY MEDICINE

## 2018-04-12 PROCEDURE — 85014 HEMATOCRIT: CPT

## 2018-04-12 PROCEDURE — 74177 CT ABD & PELVIS W/CONTRAST: CPT

## 2018-04-12 PROCEDURE — 80053 COMPREHEN METABOLIC PANEL: CPT | Performed by: EMERGENCY MEDICINE

## 2018-04-12 PROCEDURE — 87040 BLOOD CULTURE FOR BACTERIA: CPT | Performed by: EMERGENCY MEDICINE

## 2018-04-12 PROCEDURE — 82728 ASSAY OF FERRITIN: CPT | Performed by: FAMILY MEDICINE

## 2018-04-12 PROCEDURE — 84443 ASSAY THYROID STIM HORMONE: CPT | Performed by: EMERGENCY MEDICINE

## 2018-04-12 PROCEDURE — 84484 ASSAY OF TROPONIN QUANT: CPT | Performed by: FAMILY MEDICINE

## 2018-04-12 PROCEDURE — 83550 IRON BINDING TEST: CPT | Performed by: FAMILY MEDICINE

## 2018-04-12 PROCEDURE — 86920 COMPATIBILITY TEST SPIN: CPT

## 2018-04-12 PROCEDURE — 80047 BASIC METABLC PNL IONIZED CA: CPT

## 2018-04-12 PROCEDURE — 85025 COMPLETE CBC W/AUTO DIFF WBC: CPT | Performed by: FAMILY MEDICINE

## 2018-04-12 PROCEDURE — 85025 COMPLETE CBC W/AUTO DIFF WBC: CPT | Performed by: EMERGENCY MEDICINE

## 2018-04-12 PROCEDURE — 30233N1 TRANSFUSION OF NONAUTOLOGOUS RED BLOOD CELLS INTO PERIPHERAL VEIN, PERCUTANEOUS APPROACH: ICD-10-PCS | Performed by: FAMILY MEDICINE

## 2018-04-12 PROCEDURE — 99221 1ST HOSP IP/OBS SF/LOW 40: CPT | Performed by: ORTHOPAEDIC SURGERY

## 2018-04-12 PROCEDURE — 83540 ASSAY OF IRON: CPT | Performed by: FAMILY MEDICINE

## 2018-04-12 PROCEDURE — 73030 X-RAY EXAM OF SHOULDER: CPT

## 2018-04-12 PROCEDURE — 85610 PROTHROMBIN TIME: CPT | Performed by: EMERGENCY MEDICINE

## 2018-04-12 PROCEDURE — 36415 COLL VENOUS BLD VENIPUNCTURE: CPT | Performed by: EMERGENCY MEDICINE

## 2018-04-12 PROCEDURE — C9113 INJ PANTOPRAZOLE SODIUM, VIA: HCPCS | Performed by: FAMILY MEDICINE

## 2018-04-12 PROCEDURE — 99223 1ST HOSP IP/OBS HIGH 75: CPT | Performed by: FAMILY MEDICINE

## 2018-04-12 PROCEDURE — 82272 OCCULT BLD FECES 1-3 TESTS: CPT

## 2018-04-12 PROCEDURE — 85730 THROMBOPLASTIN TIME PARTIAL: CPT | Performed by: EMERGENCY MEDICINE

## 2018-04-12 PROCEDURE — 93005 ELECTROCARDIOGRAM TRACING: CPT

## 2018-04-12 PROCEDURE — 86901 BLOOD TYPING SEROLOGIC RH(D): CPT | Performed by: EMERGENCY MEDICINE

## 2018-04-12 PROCEDURE — P9021 RED BLOOD CELLS UNIT: HCPCS

## 2018-04-12 PROCEDURE — 83735 ASSAY OF MAGNESIUM: CPT | Performed by: EMERGENCY MEDICINE

## 2018-04-12 PROCEDURE — 70498 CT ANGIOGRAPHY NECK: CPT

## 2018-04-12 PROCEDURE — 81001 URINALYSIS AUTO W/SCOPE: CPT | Performed by: EMERGENCY MEDICINE

## 2018-04-12 PROCEDURE — 86850 RBC ANTIBODY SCREEN: CPT | Performed by: EMERGENCY MEDICINE

## 2018-04-12 PROCEDURE — 83690 ASSAY OF LIPASE: CPT | Performed by: EMERGENCY MEDICINE

## 2018-04-12 PROCEDURE — 73200 CT UPPER EXTREMITY W/O DYE: CPT

## 2018-04-12 PROCEDURE — 71260 CT THORAX DX C+: CPT

## 2018-04-12 PROCEDURE — 99285 EMERGENCY DEPT VISIT HI MDM: CPT

## 2018-04-12 PROCEDURE — 84484 ASSAY OF TROPONIN QUANT: CPT | Performed by: EMERGENCY MEDICINE

## 2018-04-12 PROCEDURE — 70496 CT ANGIOGRAPHY HEAD: CPT

## 2018-04-12 RX ORDER — PANTOPRAZOLE SODIUM 40 MG/1
40 INJECTION, POWDER, FOR SOLUTION INTRAVENOUS EVERY 12 HOURS SCHEDULED
Status: DISCONTINUED | OUTPATIENT
Start: 2018-04-12 | End: 2018-04-15

## 2018-04-12 RX ORDER — OXYBUTYNIN CHLORIDE 5 MG/1
5 TABLET ORAL DAILY
Status: DISCONTINUED | OUTPATIENT
Start: 2018-04-13 | End: 2018-04-16 | Stop reason: HOSPADM

## 2018-04-12 RX ORDER — SODIUM CHLORIDE 9 MG/ML
100 INJECTION, SOLUTION INTRAVENOUS CONTINUOUS
Status: DISCONTINUED | OUTPATIENT
Start: 2018-04-12 | End: 2018-04-13

## 2018-04-12 RX ORDER — LIDOCAINE HYDROCHLORIDE 20 MG/ML
30 INJECTION, SOLUTION EPIDURAL; INFILTRATION; INTRACAUDAL; PERINEURAL ONCE
Status: COMPLETED | OUTPATIENT
Start: 2018-04-12 | End: 2018-04-12

## 2018-04-12 RX ORDER — LOSARTAN POTASSIUM AND HYDROCHLOROTHIAZIDE 12.5; 1 MG/1; MG/1
1 TABLET ORAL DAILY
COMMUNITY
End: 2019-01-01 | Stop reason: HOSPADM

## 2018-04-12 RX ADMIN — LIDOCAINE HYDROCHLORIDE 30 ML: 20 INJECTION, SOLUTION INFILTRATION; PERINEURAL at 15:41

## 2018-04-12 RX ADMIN — IOHEXOL 100 ML: 350 INJECTION, SOLUTION INTRAVENOUS at 13:08

## 2018-04-12 RX ADMIN — PANTOPRAZOLE SODIUM 40 MG: 40 INJECTION, POWDER, FOR SOLUTION INTRAVENOUS at 20:40

## 2018-04-12 RX ADMIN — CEFTRIAXONE 1000 MG: 1 INJECTION, SOLUTION INTRAVENOUS at 15:44

## 2018-04-12 RX ADMIN — SODIUM CHLORIDE 100 ML/HR: 0.9 INJECTION, SOLUTION INTRAVENOUS at 19:25

## 2018-04-12 NOTE — TRAUMA DOCUMENTATION
Pt stating has to void  Placed on bedpan unable to void  Adan Miller Spoke with dr Siobhan Whitmore  #16fr augustin catheter inserted to st drainage for 200 ml   Yellow urine specimen obtained and sent to lab

## 2018-04-12 NOTE — H&P
H&P Exam - Fabienne Salazar 82 y o  female MRN: [id number]    Unit/Bed#:  Encounter: [id number]    Assessment:    Acute blood loss anemia secondary G I bleed vs  Vaginal bleeding  Transfuse 2 units PRBC  NPO, Gentle IVF  protonix iv bid  Monitor H&H closely   Consult G  I  Check Stool for occult blood  Consider transvaginal ultrasound    Essential Hypertension  Hold all antihypertensive medications  Trend troponin  Check Echo    Anterior Shoulder Dislocation  Consult Ortho     Acute cystitis without hematuria  Ceftriaxone  Follow cultures    Carotid Artery Disease  Continue statin  No aspirin secondary to profound anemia    LOS > 2 midnight  Full code  No parenteral DVT Prophylaxis      History of Present Illness   The patient is an 82year old female from home brought in accompanied by her son after having a having a fall  The patient's son states that over the past 2 days that patient has been very weak  She had a ground level fall witnessed by her son  He states he thinks she slid today but yesterday she fell with her walker  He states that he has noticed "something fall out of her", which he thought was big hemorrhoids while standing her up in the shower when he bathed her  He states that this would sometimes bleed but not profusely  Maybe 1 tablespoon  He also states that he was able to push this back without any complication  The patient was noted by nursing to have vaginal prolapse in the ED that was reduced during augustin insertion, this would make more sense as I did not palpate masses on rectal exam  I did not complete a bimanual exam  He denies ever noting bright red blood per rectum when he wipes her, he does not believe she is having melena  There has been no mirella hematemesis  His mother ambulates around the house, her appetite over the past few weeks has declined  Review of Systems   Constitutional: Positive for appetite change  Neurological: Positive for dizziness, syncope and weakness     All other systems reviewed and are negative  Historical Information   Past Medical History:   Diagnosis Date    Acute respiratory failure with hypoxia and hypercapnia (HCC)     Arthritis     Asthma     Hypertension     Stroke ([address] )     residual RLE weakness     Past Surgical History:   Procedure Laterality Date    CLOSED REDUCTION HUMERUS FRACTURE Right 11/29/2016    Procedure: CLOSED REDUCTION ARM/HUMERUS  CLOSED VS  OPEN SHOULDER REDUCTION;  Surgeon: Marisol Church MD;  Location: [facility];  Service:     EYE SURGERY      bilateral CAT EXT W/IOLs    RECTAL PROLAPSE REPAIR      done 5-6 yrs ago     Social History   History   Alcohol Use No     History   Drug Use No     History   Smoking Status    Never Smoker   Smokeless Tobacco    Never Used     Family History: non-contributory    Meds/Allergies   all medications and allergies reviewed  No Known Allergies    Objective   First Vitals:   Blood Pressure: 130/59 (04/12/18 1200)  Pulse: 92 (04/12/18 1200)  Temperature: 98 5 °F (36 9 °C) (04/12/18 1200)  Temp Source: Temporal (04/12/18 1200)  Respirations: 22 (04/12/18 1200)  SpO2: 100 % (04/12/18 1200)    Current Vitals:   Blood Pressure: 140/60 (04/12/18 1756)  Pulse: 85 (04/12/18 1756)  Temperature: 98 1 °F (36 7 °C) (04/12/18 1756)  Temp Source: Temporal (04/12/18 1756)  Respirations: 22 (04/12/18 1756)  SpO2: 100 % (04/12/18 1751)    No intake or output data in the 24 hours ending 04/12/18 1759    Invasive Devices     Peripheral Intravenous Line            Peripheral IV Left Antecubital -- days    Peripheral IV 04/12/18 Right Wrist less than 1 day                Physical Exam   Constitutional: No distress  HENT:   Head: Normocephalic and atraumatic  Eyes: No scleral icterus  Neck: Neck supple  No JVD present  Cardiovascular: Normal rate and regular rhythm  Murmur heard  Pulmonary/Chest: Effort normal and breath sounds normal  No respiratory distress  She has no wheezes  She has no rales  Abdominal: Soft  Bowel sounds are normal  She exhibits no distension  There is no tenderness  There is no rebound and no guarding  Musculoskeletal: Normal range of motion  She exhibits no edema or tenderness  Neurological: No cranial nerve deficit  Lehtargic, moving all 4 extremities  Able to answer son clearly   Skin: Skin is warm  No rash noted  She is not diaphoretic  No erythema  Lab Results:   Lab Results   Component Value Date    WBC 8 28 04/12/2018    HGB  04/12/2018      Comment:      Out of Reportable Range    HCT 14 5 (L) 04/12/2018    MCV 67 (L) 04/12/2018     (L) 04/12/2018       Imaging:   XR shoulder 2+ vw left   Final Result      No acute osseous abnormality  Workstation performed: [id number]         XR shoulder 2+ views RIGHT   Final Result      Anterior dislocation of the right shoulder  Hill-Sachs deformity of the humeral head of indeterminate age  Small corticated osseous densities lateral to the glenoid could represent avulsion fractures of indeterminate age  These findings are new from    prior x-ray however  Workstation performed: [id number]         CTA head and neck with and without contrast   Final Result      No acute intracranial disease  Footprint of advanced chronic small vessel disease and remote left parietal cortical/subcortical infarct are stable  Multifocal atherosclerotic disease with minor narrowing of both vertebral origins, and calculated 38% short segment stenosis right ICA  Left common carotid is occluded shortly beyond the origin, with reconstitution at the bifurcation and likely through ECA collaterals  Severe stenosis of the left ICA  Mild to moderate Multifocal intracranial stenoses  Workstation performed: [id number]         CT chest abdomen pelvis w contrast   Final Result      Anterior dislocation right shoulder, age-indeterminate    Patient has a history of previous right shoulder dislocation per report  Old, multifocal osseous trauma as detailed above  Small bilateral water density pleural effusions  No acute traumatic findings seen in the abdomen or pelvis  Incidental findings as detailed above        Workstation performed: [id number]         CT shoulder right wo contrast    (Results Pending)       EKG, Pathology, and Other Studies: NSR    Code Status: Level 1 - Full Code  Advance Directive and Living Will: Yes    Power of :    POLST:      Counseling / Coordination of Care

## 2018-04-12 NOTE — CONSULTS
Consultation- Orthopedics   Patrice Olvera 82 y o  [unfilled] MRN: [id number]  Unit/Bed#: [facility]      Chief Complaint: right  Shoulder Pain    HPI:Right Shoulder pain 2 days  ? Marlenejames Sim at home  Previously dislocated right shoulder 2016    Review Of Systems:      Gen: Normal   Skin: Normal   Musculoskeletal: See HPI   All Other Systems Negative      Past Medical History:   Past Medical History:   Diagnosis Date    Acute respiratory failure with hypoxia and hypercapnia (HCC)     Arthritis     Asthma     Hypertension     Stroke ([address] )     residual RLE weakness       Past Surgical History:   Past Surgical History:   Procedure Laterality Date    CLOSED REDUCTION HUMERUS FRACTURE Right 11/29/2016    Procedure: CLOSED REDUCTION ARM/HUMERUS  CLOSED VS  OPEN SHOULDER REDUCTION;  Surgeon: Shanell Calderón MD;  Location: [facility];  Service:     EYE SURGERY      bilateral CAT EXT W/IOLs    RECTAL PROLAPSE REPAIR      done 5-6 yrs ago       Family [email or web address]    Social History:  Social History     Social History    Marital status:       Spouse name: N/A    Number of children: N/A    Years of education: N/A     Social History Main Topics    Smoking status: Never Smoker    Smokeless tobacco: Never Used    Alcohol use No    Drug use: No    Sexual activity: Not Asked      Comment: deferred     Other Topics Concern    None     Social History Narrative    None       Allergies: No Known Allergies    Medications:   Current Facility-Administered Medications   Medication Dose Route Frequency Provider Last Rate Last Dose    cefTRIAXone (ROCEPHIN) IVPB (premix) 1,000 mg  1,000 mg Intravenous Once Frances Sena MD        lidocaine (PF) (XYLOCAINE-MPF) 2 % injection 30 mL  30 mL Infiltration Once Frances Sena MD         Current Outpatient Prescriptions   Medication Sig Dispense Refill    atorvastatin (LIPITOR) 20 mg tablet Take 1 tablet by mouth daily with dinner for 30 days 30 tablet 0    celecoxib (CeleBREX) 200 mg capsule Take 100 mg by mouth daily        doxazosin (CARDURA) 4 mg tablet Take 4 mg by mouth daily at bedtime      losartan-hydrochlorothiazide (HYZAAR) 100-12 5 MG per tablet Take 1 tablet by mouth daily      metoprolol tartrate (LOPRESSOR) 25 mg tablet Take 1 tablet by mouth every 12 (twelve) hours for 30 days (Patient taking differently: Take 25 mg by mouth 3 (three) times a day  ) 60 tablet 0    oxybutynin (DITROPAN) 5 mg tablet Take 5 mg by mouth daily             Physical Exam:     Vitals: /61 (BP Location: Left arm)   Pulse 92   Temp 98 5 °F (36 9 °C) (Temporal)   Resp 20   SpO2 100%     Psych: n, frail  Eyes: EOM intact, Eyes clear, PERRLA   ENT: Hearing intact, Mucous Membranes moist  Lungs: Clear, No Audible wheeze  Abdomen: Non-tender, Non-distended, Soft  Lymph: No Lymphadenopathy  Cardiovascular: No Discernable Arrhythmia  Musculoskeletal: Right Shoulder  Skin intact   Shoulder dislocated   Motor/Sensation intact  Palpable Radial pulse  Radiology: I personally reviewed the films  X-ray Right Shoulder: Dislocated anterior    Labs:    0  Lab Value Date/Time   HCT 14 5 (L) 04/12/2018 1237   HGB  04/12/2018 1240   Comment:   Out of Reportable Range   HGB 3 8 (LL) 04/12/2018 1237   INR 1 36 (H) 04/12/2018 1237   WBC 8 28 04/12/2018 1237        Assessment: Dislocated R shoulder     Plan:  For Closed or open reduction in OR tomorrow if medically cleared  D/w son and Dr Jose Qureshi  Consent on chart

## 2018-04-12 NOTE — ED PROVIDER NOTES
History  Chief Complaint   Patient presents with    Weakness - Generalized     pt started with right sided weakness yesterday and had 2 unwitnessed falls today     82-year-old female with remote history of stroke 10 years ago are yesterday was noted by family to have more generalized weakness today she sustained the same level fall x2 that was unwitnessed she apparently denied hitting her head both x2 family but was talking normally the contacted 911 and upon their arrival she was having difficulty with moving her right side and garbled speech was noted  No facial droop was noted Accu-Chek EN route was normal blood pressure was 136/89  Patient here denying any complaints of pain  She is unable to speak there is no facial droop  She is oriented to her name  She has equal hand   She is denying any shortness of breath or chest pain  Trauma eval called upon patient arrival             Prior to Admission Medications   Prescriptions Last Dose Informant Patient Reported?  Taking?   atorvastatin (LIPITOR) 20 mg tablet   No Yes   Sig: Take 1 tablet by mouth daily with dinner for 30 days   celecoxib (CeleBREX) 100 mg capsule   Yes Yes   Sig: Take 100 mg by mouth daily     doxazosin (CARDURA) 4 mg tablet   Yes Yes   Sig: Take 4 mg by mouth daily at bedtime   losartan-hydrochlorothiazide (HYZAAR) 100-12 5 MG per tablet   Yes Yes   Sig: Take 1 tablet by mouth daily   metoprolol tartrate (LOPRESSOR) 25 mg tablet   No Yes   Sig: Take 1 tablet by mouth every 12 (twelve) hours for 30 days   Patient taking differently: Take 25 mg by mouth 3 (three) times a day     oxybutynin (DITROPAN) 5 mg tablet   Yes Yes   Sig: Take 5 mg by mouth daily        Facility-Administered Medications: None       Past Medical History:   Diagnosis Date    Acute respiratory failure with hypoxia and hypercapnia (HCC)     Arthritis     Asthma     Hypertension     Stroke ([facility] [address] )     residual RLE weakness       Past Surgical History:   Procedure Laterality Date    CLOSED REDUCTION HUMERUS FRACTURE Right 11/29/2016    Procedure: CLOSED REDUCTION ARM/HUMERUS  CLOSED VS  OPEN SHOULDER REDUCTION;  Surgeon: Toyin Kulkarni MD;  Location: [facility];  Service:     EYE SURGERY      bilateral CAT EXT W/IOLs    RECTAL PROLAPSE REPAIR      done 5-6 yrs ago       Family History   Problem Relation Age of Onset    Hypertension Son      I have reviewed and agree with the history as documented  Social History   Substance Use Topics    Smoking status: Never Smoker    Smokeless tobacco: Never Used    Alcohol use No        Review of Systems   Constitutional: Positive for activity change  Negative for chills and fever  HENT: Negative for congestion, ear pain, rhinorrhea, sneezing and sore throat  Eyes: Negative for discharge and visual disturbance  Respiratory: Negative for cough and shortness of breath  Cardiovascular: Negative for chest pain and leg swelling  Gastrointestinal: Positive for anal bleeding (son arrives later describes hemorroids whidh pops in and out )  Negative for abdominal pain, blood in stool, diarrhea, nausea and vomiting  Endocrine: Negative for polyuria  Genitourinary: Negative for difficulty urinating, dysuria, frequency and urgency  Musculoskeletal: Negative for back pain and myalgias  Skin: Negative for rash  Neurological: Positive for dizziness, speech difficulty, weakness and light-headedness  Negative for facial asymmetry, numbness and headaches  Hematological: Negative for adenopathy  Psychiatric/Behavioral: Positive for confusion  All other systems reviewed and are negative        Physical Exam  ED Triage Vitals   Temperature Pulse Respirations Blood Pressure SpO2   04/12/18 1200 04/12/18 1200 04/12/18 1200 04/12/18 1200 04/12/18 1200   98 5 °F (36 9 °C) 92 22 130/59 100 %      Temp Source Heart Rate Source Patient Position - Orthostatic VS BP Location FiO2 (%)   04/12/18 1200 04/12/18 1200 04/12/18 1215 04/12/18 1215 --   Temporal Monitor Lying Left arm       Pain Score       04/12/18 1215       5           Orthostatic Vital Signs  Vitals:    04/12/18 2047 04/12/18 2237 04/12/18 2245 04/13/18 0000   BP: 138/62 142/70 149/71 133/60   Pulse: 87 80 82 73   Patient Position - Orthostatic VS:   Lying        Physical Exam   Constitutional: She appears well-developed  No distress  pale   HENT:   Head: Normocephalic and atraumatic  Right Ear: External ear normal    Left Ear: External ear normal    Nose: Nose normal    Mouth/Throat: Oropharynx is clear and moist  No oropharyngeal exudate  Eyes: Conjunctivae and EOM are normal  Pupils are equal, round, and reactive to light  Right eye exhibits no discharge  Left eye exhibits no discharge  2mm equal   Neck: Normal range of motion  Neck supple  No midline or paraspinous tenderness   Cardiovascular: Normal rate, regular rhythm and normal heart sounds  Pulmonary/Chest: Effort normal and breath sounds normal  No respiratory distress  She exhibits no tenderness  Genitourinary: Rectal exam shows guaiac positive stool  Genitourinary Comments: nml tome no palp hemorrhoids brwn stool no gross blood heme postivie controls intact   Musculoskeletal:        Right shoulder: She exhibits decreased range of motion, swelling and crepitus  She exhibits no tenderness, no bony tenderness, no deformity (assymetric from left but difficult secondary to body habitus), no laceration, no pain, no spasm, normal pulse and normal strength  Left shoulder: Normal  She exhibits normal range of motion, no tenderness, no bony tenderness, no swelling, no effusion, no crepitus, no deformity, no laceration, no pain, no spasm, normal pulse and normal strength          Right elbow: Normal        Right hand: She exhibits normal range of motion, no tenderness, no bony tenderness, normal two-point discrimination (light touch intact ), normal capillary refill, no deformity, no laceration and no swelling  Lymphadenopathy:     She has no cervical adenopathy  Neurological: She is alert  She displays normal reflexes  No cranial nerve deficit or sensory deficit  She exhibits normal muscle tone  Coordination normal    GCS([id number])10 upon arrival; shortly later able to answer yes or no questions clearly    Skin: Skin is warm and dry  Psychiatric:   flat   Vitals reviewed  ED Medications  Medications   oxybutynin (DITROPAN) tablet 5 mg (not administered)   cefTRIAXone (ROCEPHIN) IVPB (premix) 1,000 mg (not administered)   sodium chloride 0 9 % infusion (100 mL/hr Intravenous New Bag 4/12/18 1925)   pantoprazole (PROTONIX) injection 40 mg (40 mg Intravenous Given 4/12/18 2040)   iohexol (OMNIPAQUE) 350 MG/ML injection (SINGLE-DOSE) 100 mL (100 mL Intravenous Given 4/12/18 1308)   cefTRIAXone (ROCEPHIN) IVPB (premix) 1,000 mg (0 mg Intravenous Stopped 4/12/18 1610)   lidocaine (PF) (XYLOCAINE-MPF) 2 % injection 30 mL (30 mL Infiltration Given by Other 4/12/18 1541)       Diagnostic Studies  Results Reviewed     Procedure Component Value Units Date/Time    Comprehensive metabolic panel [[id number]]  (Abnormal) Collected:  04/12/18 1422    Lab Status:  Final result Specimen:  Blood from Hand, Left Updated:  04/12/18 1453     Sodium 145 mmol/L      Potassium 4 6 mmol/L      Chloride 112 (H) mmol/L      CO2 23 mmol/L      Anion Gap 10 mmol/L      BUN 49 (H) mg/dL      Creatinine 1 17 mg/dL      Glucose 139 mg/dL      Calcium 8 1 mg/dL      AST 12 U/L      ALT 14 U/L      Alkaline Phosphatase 72 U/L      Total Protein 5 9 (L) g/dL      Albumin 2 8 (L) g/dL      Total Bilirubin 0 40 mg/dL      eGFR 44 ml/min/1 73sq m     Narrative:         National Kidney Disease Education Program recommendations are as follows:  GFR calculation is accurate only with a steady state creatinine  Chronic Kidney disease less than 60 ml/min/1 73 sq  meters  Kidney failure less than 15 ml/min/1 73 sq  meters      TSH [[id number]]  (Normal) Collected:  04/12/18 1422    Lab Status:  Final result Specimen:  Blood from Hand, Left Updated:  04/12/18 1453     TSH 3RD GENERATON 0 976 uIU/mL     Narrative:         Patients undergoing fluorescein dye angiography may retain small amounts of fluorescein in the body for 48-72 hours post procedure  Samples containing fluorescein can produce falsely depressed TSH values  If the patient had this procedure,a specimen should be resubmitted post fluorescein clearance  The recommended reference ranges for TSH during pregnancy are as follows:  First trimester 0 1 to 2 5 uIU/mL  Second trimester  0 2 to 3 0 uIU/mL  Third trimester 0 3 to 3 0 uIU/m      Lipase [[id number]]  (Abnormal) Collected:  04/12/18 1422    Lab Status:  Final result Specimen:  Blood from Hand, Left Updated:  04/12/18 1453     Lipase 65 (L) u/L     Magnesium [[id number]]  (Normal) Collected:  04/12/18 1422    Lab Status:  Final result Specimen:  Blood from Hand, Left Updated:  04/12/18 1453     Magnesium 2 0 mg/dL     Blood culture #1 [[id number]] Collected:  04/12/18 1418    Lab Status:   In process Specimen:  Blood from Hand, Left Updated:  04/12/18 1427    Urine Microscopic [[id number]]  (Abnormal) Collected:  04/12/18 1333    Lab Status:  Final result Specimen:  Urine from Urine, Indwelling Ochoa Catheter Updated:  04/12/18 1420     RBC, UA 4-10 (A) /hpf      WBC, UA 2-4 (A) /hpf      Epithelial Cells Occasional /hpf      Bacteria, UA Moderate (A) /hpf     UA w Reflex to Microscopic w Reflex to Culture [[id number]]  (Abnormal) Collected:  04/12/18 1333    Lab Status:  Final result Specimen:  Urine from Urine, Indwelling Ochoa Catheter Updated:  04/12/18 1404     Color, UA Yellow     Clarity, UA Clear     Specific Gravity, UA 1 010     pH, UA 6 0     Leukocytes, UA Small (A)     Nitrite, UA Positive (A)     Protein, UA Negative mg/dl      Glucose, UA Negative mg/dl      Ketones, UA Negative mg/dl      Urobilinogen, UA 0 2 E U /dl      Bilirubin, UA Negative     Blood, UA Large (A)    CBC and differential [[id number]]  (Abnormal) Collected:  04/12/18 1237    Lab Status:  Final result Specimen:  Blood from Arm, Left Updated:  04/12/18 1327     WBC 8 28 Thousand/uL      RBC 2 18 (L) Million/uL      Hemoglobin 3 8 (LL) g/dL      Hematocrit 14 5 (L) %      MCV 67 (L) fL      MCH 17 4 (L) pg      MCHC 26 2 (L) g/dL      RDW 21 6 (H) %      Platelets 538 (L) Thousands/uL      Neutrophils Relative 83 (H) %      Lymphocytes Relative 12 (L) %      Monocytes Relative 4 %      Eosinophils Relative 0 %      Basophils Relative 1 %      Neutrophils Absolute 6 94 Thousands/µL      Lymphocytes Absolute 0 95 Thousands/µL      Monocytes Absolute 0 34 Thousand/µL      Eosinophils Absolute 0 01 Thousand/µL      Basophils Absolute 0 04 Thousands/µL     Narrative:        report per dr Norma Charles  This is an appended report  These results have been appended to a previously verified report  Protime-INR [[id number]]  (Abnormal) Collected:  04/12/18 1237    Lab Status:  Final result Specimen:  Blood from Arm, Left Updated:  04/12/18 1318     Protime 16 7 (H) seconds      INR 1 36 (H)    APTT [[id number]]  (Abnormal) Collected:  04/12/18 1237    Lab Status:  Final result Specimen:  Blood from Arm, Left Updated:  04/12/18 1317     PTT 39 (H) seconds     Troponin I [[id number]]  (Normal) Collected:  04/12/18 1237    Lab Status:  Final result Specimen:  Blood from Arm, Left Updated:  04/12/18 1307     Troponin I 0 02 ng/mL     Narrative:         [employer] Chemistry analyzer 99% cutoff is > 0 04 ng/mL in network labs    o cTnI 99% cutoff is useful only when applied to patients in the clinical setting of myocardial ischemia  o cTnI 99% cutoff should be interpreted in the context of clinical history, ECG findings and possibly cardiac imaging to establish correct diagnosis    o cTnI 99% cutoff may be suggestive but clearly not indicative of a coronary event without the clinical setting of myocardial ischemia  POCT Chem 8+ [[id number]]  (Abnormal) Collected:  04/12/18 1240    Lab Status:  Final result Updated:  04/12/18 1244     SODIUM, I-STAT 145 mmol/l      Potassium, i-STAT 4 6 mmol/L      Chloride, istat 111 (H) mmol/L      CO2, i-STAT 22 mmol/L      Anion Gap, Istat 17 (H) mmol/L      Calcium, Ionized i-STAT 1 19 mmol/L      BUN, I-STAT 49 (H) mg/dl      Creatinine, i-STAT 1 2 mg/dl      eGFR 42 ml/min/1 73sq m      Glucose, i-STAT 141 (H) mg/dl      Hct, i-STAT <15 (L) %      Hgb, i-STAT -- g/dl      Specimen Type VENOUS    Blood culture #2 [[id number]] Collected:  04/12/18 1237    Lab Status: In process Specimen:  Blood from Arm, Left Updated:  04/12/18 1243                 CT shoulder right wo contrast   Final Result by Mitch Haddad MD (04/12 0887)         1  Subcoracoid, anterior glenohumeral joint dislocation  2   Age-indeterminate Hill-Sachs deformity with several osseous fragments medial to the osseous glenoid  3   Small right pleural effusion  Workstation performed: [id number]         XR shoulder 2+ vw left   Final Result by Cristin Sandoval MD (04/12 5913)      No acute osseous abnormality  Workstation performed: [id number]         XR shoulder 2+ views RIGHT   Final Result by Cristin Sandoval MD (04/12 9953)      Anterior dislocation of the right shoulder  Hill-Sachs deformity of the humeral head of indeterminate age  Small corticated osseous densities lateral to the glenoid could represent avulsion fractures of indeterminate age  These findings are new from    prior x-ray however  Workstation performed: [id number]         CTA head and neck with and without contrast   Final Result by Corrine Leonardo MD (04/12 9863)      No acute intracranial disease  Footprint of advanced chronic small vessel disease and remote left parietal cortical/subcortical infarct are stable        Multifocal atherosclerotic disease with minor narrowing of both vertebral origins, and calculated 38% short segment stenosis right ICA  Left common carotid is occluded shortly beyond the origin, with reconstitution at the bifurcation and likely through ECA collaterals  Severe stenosis of the left ICA  Mild to moderate Multifocal intracranial stenoses  Workstation performed: [id number]         CT chest abdomen pelvis w contrast   Final Result by Obed Dawn DO (04/12 1876)      Anterior dislocation right shoulder, age-indeterminate  Patient has a history of previous right shoulder dislocation per report  Old, multifocal osseous trauma as detailed above  Small bilateral water density pleural effusions  No acute traumatic findings seen in the abdomen or pelvis  Incidental findings as detailed above  Workstation performed: [id number]                    Procedures  ECG 12 Lead Documentation  Date/Time: 4/12/2018 12:31 PM  Performed by: Carmel Montes  Authorized by: Carmel Montes     Indications / Diagnosis:  Weakness  ECG reviewed by me, the ED Provider: yes    Patient location:  ED  Rate:     ECG rate:  93    ECG rate assessment: normal    Rhythm:     Rhythm: sinus rhythm    QRS:     QRS axis:  Normal  Comments:      No acute ischemic changes           Phone Contacts  ED Phone Contact    ED Course  ED Course as of Apr 13 0605   Thu Apr 12, 2018   1438 Case reviewed with Dr Margo Nick full admit ICU will transfuse 2 PRBC rocephin for UTI; patient c/o left shoulder pain will xray both shoulders    1520 Dr Finn Stephens consulted for shoulder reduction secondary to anemia and unlkely to tolerate procedural sedation    1535 Dr Finn Stephens unable to reduce shoulder most likely several days old  Will attempt tomorrow on the OR schedule                 NIH Stroke Scale    Flowsheet Row Most Recent Value   Level of Consciousness (1a )  0 Filed at: 04/12/2018 1200   LOC Questions (1b )  0 Filed at: 04/12/2018 1200   LOC Commands (1c )  0 Filed at: 04/12/2018 1200 Best Gaze (2 )  0 Filed at: 04/12/2018 1200   Visual (3 )  0 Filed at: 04/12/2018 1200   Facial Palsy (4 )  0 Filed at: 04/12/2018 1200   Motor Arm, Left (5a )  0 Filed at: 04/12/2018 1200   Motor Arm, Right (5b )  0 Filed at: 04/12/2018 1200   Motor Leg, Left (6a )  0 Filed at: 04/12/2018 1200   Motor Leg, Right (6b )  1 Filed at: 04/12/2018 1200   Limb Ataxia (7 )  0 Filed at: 04/12/2018 1200   Sensory (8 )  0 Filed at: 04/12/2018 1200   Best Language (9 )  1 Filed at: 04/12/2018 1200   Dysarthria (10 )  0 Filed at: 04/12/2018 1200   Extinction and Inattention (11 ) (Formerly Neglect)  0 Filed at: 04/12/2018 1200   Total  2 Filed at: 04/12/2018 1200                        MDM  Number of Diagnoses or Management Options  Anemia:   Anterior shoulder dislocation:   Frequent falls:   Diagnosis management comments: Mdm: ICH, CVA, acs, infection    The patient presented with a condition in which there was a high probability of imminent or life-threatening deterioration, and critical care services (excluding separately billable procedures) totalled 30-74 minutes          Disposition  Final diagnoses:   Anemia - 3 8   Frequent falls   Anterior shoulder dislocation - right   UTI (urinary tract infection)     Time reflects when diagnosis was documented in both MDM as applicable and the Disposition within this note     Time User Action Codes Description Comment    4/12/2018  2:59 PM Frederick Youngblood Add [S43 004S] Dislocation of right shoulder joint, sequela     4/12/2018  2:59 PM Frederick Youngblood Modify [S43 004S] Dislocation of right shoulder joint, sequela     4/12/2018  2:59 PM Frederick Youngblood Modify [S43 004S] Dislocation of right shoulder joint, sequela     4/12/2018  3:00 PM Sophia Duran Add [K92 2] GI bleed     4/12/2018  3:00 PM Rudy Zabala [K92 2] GI bleed     4/12/2018  3:18 PM Mukesh Ra [D64 9] Anemia     4/12/2018  3:18 PM Carmelo Hernandez Modify [D64 9] Anemia 3 8    4/12/2018  3:18 PM Sunil Grater Add [R29 6] Frequent falls     4/12/2018  3:22 PM Sunil Grater Add [C70 588Y] Anterior shoulder dislocation     4/12/2018  3:22 PM Sunil Grater Modify [V78 827R] Anterior shoulder dislocation right    4/13/2018  6:02 AM Jhoana Perdue Add [N39 0] UTI (urinary tract infection)       ED Disposition     ED Disposition Condition Comment    Admit  Case was discussed with Dr Kike Remy and the patient's admission status was agreed to be Admission Status: inpatient status to the service of Dr Kike Remy ICU   Follow-up Information    None       Current Discharge Medication List      CONTINUE these medications which have NOT CHANGED    Details   atorvastatin (LIPITOR) 20 mg tablet Take 1 tablet by mouth daily with dinner for 30 days  Qty: 30 tablet, Refills: 0      celecoxib (CeleBREX) 100 mg capsule Take 100 mg by mouth daily        doxazosin (CARDURA) 4 mg tablet Take 4 mg by mouth daily at bedtime      losartan-hydrochlorothiazide (HYZAAR) 100-12 5 MG per tablet Take 1 tablet by mouth daily      metoprolol tartrate (LOPRESSOR) 25 mg tablet Take 1 tablet by mouth every 12 (twelve) hours for 30 days  Qty: 60 tablet, Refills: 0      oxybutynin (DITROPAN) 5 mg tablet Take 5 mg by mouth daily             No discharge procedures on file      ED Provider  Electronically Signed by           Cecil Nicole MD  04/13/18 5953

## 2018-04-13 ENCOUNTER — APPOINTMENT (INPATIENT)
Dept: RADIOLOGY | Facility: HOSPITAL | Age: 83
DRG: 812 | End: 2018-04-13
Payer: MEDICARE

## 2018-04-13 ENCOUNTER — ANESTHESIA (INPATIENT)
Dept: PERIOP | Facility: HOSPITAL | Age: 83
DRG: 812 | End: 2018-04-13
Payer: MEDICARE

## 2018-04-13 PROBLEM — R77.8 ELEVATED TROPONIN: Status: ACTIVE | Noted: 2018-04-13

## 2018-04-13 PROBLEM — N81.10 VAGINAL PROLAPSE: Status: ACTIVE | Noted: 2018-04-13

## 2018-04-13 LAB
ABO GROUP BLD BPU: NORMAL
ALBUMIN SERPL BCP-MCNC: 2.6 G/DL (ref 3.5–5)
ALP SERPL-CCNC: 66 U/L (ref 46–116)
ALT SERPL W P-5'-P-CCNC: 9 U/L (ref 12–78)
ANION GAP SERPL CALCULATED.3IONS-SCNC: 10 MMOL/L (ref 4–13)
AST SERPL W P-5'-P-CCNC: 14 U/L (ref 5–45)
ATRIAL RATE: 93 BPM
BILIRUB SERPL-MCNC: 0.6 MG/DL (ref 0.2–1)
BPU ID: NORMAL
BUN SERPL-MCNC: 43 MG/DL (ref 5–25)
CALCIUM SERPL-MCNC: 8 MG/DL
CHLORIDE SERPL-SCNC: 115 MMOL/L (ref 100–108)
CO2 SERPL-SCNC: 23 MMOL/L (ref 21–32)
CREAT SERPL-MCNC: 1.04 MG/DL (ref 0.6–1.3)
CROSSMATCH: NORMAL
ERYTHROCYTE [DISTWIDTH] IN BLOOD BY AUTOMATED COUNT: 21.8 % (ref 11.6–15.1)
FERRITIN SERPL-MCNC: 6 NG/ML (ref 8–388)
GFR SERPL CREATININE-BSD FRML MDRD: 51 ML/MIN/1.73SQ M
GLUCOSE SERPL-MCNC: 103 MG/DL (ref 65–140)
HCT VFR BLD AUTO: 22 % (ref 34.8–46.1)
HCT VFR BLD AUTO: 23.1 % (ref 34.8–46.1)
HGB BLD-MCNC: 7 G/DL (ref 11.5–15.4)
HGB BLD-MCNC: 7.3 G/DL (ref 11.5–15.4)
IRON SATN MFR SERPL: 16 %
IRON SERPL-MCNC: 67 UG/DL (ref 50–170)
MAGNESIUM SERPL-MCNC: 2.1 MG/DL (ref 1.6–2.6)
MCH RBC QN AUTO: 23.5 PG (ref 26.8–34.3)
MCHC RBC AUTO-ENTMCNC: 31.8 G/DL (ref 31.4–37.4)
MCV RBC AUTO: 74 FL (ref 82–98)
P AXIS: 68 DEGREES
PLATELET # BLD AUTO: 123 THOUSANDS/UL (ref 149–390)
POTASSIUM SERPL-SCNC: 3.6 MMOL/L (ref 3.5–5.3)
PR INTERVAL: 124 MS
PROT SERPL-MCNC: 5.4 G/DL (ref 6.4–8.2)
QRS AXIS: 75 DEGREES
QRSD INTERVAL: 72 MS
QT INTERVAL: 332 MS
QTC INTERVAL: 412 MS
RBC # BLD AUTO: 2.98 MILLION/UL (ref 3.81–5.12)
SODIUM SERPL-SCNC: 148 MMOL/L (ref 136–145)
T WAVE AXIS: 66 DEGREES
TIBC SERPL-MCNC: 416 UG/DL (ref 250–450)
TROPONIN I SERPL-MCNC: 0.08 NG/ML
TROPONIN I SERPL-MCNC: 0.08 NG/ML
UNIT DISPENSE STATUS: NORMAL
UNIT PRODUCT CODE: NORMAL
UNIT RH: NORMAL
VENTRICULAR RATE: 93 BPM
WBC # BLD AUTO: 7.67 THOUSAND/UL (ref 4.31–10.16)

## 2018-04-13 PROCEDURE — 85018 HEMOGLOBIN: CPT | Performed by: PHYSICIAN ASSISTANT

## 2018-04-13 PROCEDURE — 84484 ASSAY OF TROPONIN QUANT: CPT | Performed by: FAMILY MEDICINE

## 2018-04-13 PROCEDURE — 83735 ASSAY OF MAGNESIUM: CPT | Performed by: FAMILY MEDICINE

## 2018-04-13 PROCEDURE — 93010 ELECTROCARDIOGRAM REPORT: CPT | Performed by: INTERNAL MEDICINE

## 2018-04-13 PROCEDURE — P9021 RED BLOOD CELLS UNIT: HCPCS

## 2018-04-13 PROCEDURE — 99233 SBSQ HOSP IP/OBS HIGH 50: CPT | Performed by: FAMILY MEDICINE

## 2018-04-13 PROCEDURE — 99222 1ST HOSP IP/OBS MODERATE 55: CPT | Performed by: INTERNAL MEDICINE

## 2018-04-13 PROCEDURE — 73060 X-RAY EXAM OF HUMERUS: CPT

## 2018-04-13 PROCEDURE — C9113 INJ PANTOPRAZOLE SODIUM, VIA: HCPCS | Performed by: FAMILY MEDICINE

## 2018-04-13 PROCEDURE — 82272 OCCULT BLD FECES 1-3 TESTS: CPT | Performed by: FAMILY MEDICINE

## 2018-04-13 PROCEDURE — 80053 COMPREHEN METABOLIC PANEL: CPT | Performed by: FAMILY MEDICINE

## 2018-04-13 PROCEDURE — 0RSJXZZ REPOSITION RIGHT SHOULDER JOINT, EXTERNAL APPROACH: ICD-10-PCS | Performed by: ORTHOPAEDIC SURGERY

## 2018-04-13 PROCEDURE — 85027 COMPLETE CBC AUTOMATED: CPT | Performed by: FAMILY MEDICINE

## 2018-04-13 PROCEDURE — 23655 CLTX SHO DSLC W/MNPJ W/ANES: CPT | Performed by: ORTHOPAEDIC SURGERY

## 2018-04-13 PROCEDURE — 84484 ASSAY OF TROPONIN QUANT: CPT | Performed by: PHYSICIAN ASSISTANT

## 2018-04-13 PROCEDURE — 85014 HEMATOCRIT: CPT | Performed by: PHYSICIAN ASSISTANT

## 2018-04-13 RX ORDER — SODIUM CHLORIDE 9 MG/ML
50 INJECTION, SOLUTION INTRAVENOUS ONCE
Status: DISCONTINUED | OUTPATIENT
Start: 2018-04-13 | End: 2018-04-15

## 2018-04-13 RX ORDER — ROCURONIUM BROMIDE 10 MG/ML
INJECTION, SOLUTION INTRAVENOUS AS NEEDED
Status: DISCONTINUED | OUTPATIENT
Start: 2018-04-13 | End: 2018-04-13 | Stop reason: SURG

## 2018-04-13 RX ORDER — ONDANSETRON 2 MG/ML
4 INJECTION INTRAMUSCULAR; INTRAVENOUS ONCE AS NEEDED
Status: DISCONTINUED | OUTPATIENT
Start: 2018-04-13 | End: 2018-04-13 | Stop reason: HOSPADM

## 2018-04-13 RX ORDER — FENTANYL CITRATE/PF 50 MCG/ML
12.5 SYRINGE (ML) INJECTION
Status: DISCONTINUED | OUTPATIENT
Start: 2018-04-13 | End: 2018-04-13 | Stop reason: HOSPADM

## 2018-04-13 RX ORDER — LOSARTAN POTASSIUM 50 MG/1
50 TABLET ORAL DAILY
Status: DISCONTINUED | OUTPATIENT
Start: 2018-04-13 | End: 2018-04-15

## 2018-04-13 RX ORDER — SUCCINYLCHOLINE CHLORIDE 20 MG/ML
INJECTION INTRAMUSCULAR; INTRAVENOUS AS NEEDED
Status: DISCONTINUED | OUTPATIENT
Start: 2018-04-13 | End: 2018-04-13 | Stop reason: SURG

## 2018-04-13 RX ORDER — LIDOCAINE HYDROCHLORIDE 10 MG/ML
INJECTION, SOLUTION INFILTRATION; PERINEURAL AS NEEDED
Status: DISCONTINUED | OUTPATIENT
Start: 2018-04-13 | End: 2018-04-13 | Stop reason: SURG

## 2018-04-13 RX ORDER — MORPHINE SULFATE 2 MG/ML
2 INJECTION, SOLUTION INTRAMUSCULAR; INTRAVENOUS EVERY 4 HOURS PRN
Status: DISCONTINUED | OUTPATIENT
Start: 2018-04-13 | End: 2018-04-16 | Stop reason: HOSPADM

## 2018-04-13 RX ORDER — FENTANYL CITRATE 50 UG/ML
INJECTION, SOLUTION INTRAMUSCULAR; INTRAVENOUS AS NEEDED
Status: DISCONTINUED | OUTPATIENT
Start: 2018-04-13 | End: 2018-04-13 | Stop reason: SURG

## 2018-04-13 RX ORDER — ONDANSETRON 2 MG/ML
INJECTION INTRAMUSCULAR; INTRAVENOUS AS NEEDED
Status: DISCONTINUED | OUTPATIENT
Start: 2018-04-13 | End: 2018-04-13 | Stop reason: SURG

## 2018-04-13 RX ORDER — HYDRALAZINE HYDROCHLORIDE 20 MG/ML
10 INJECTION INTRAMUSCULAR; INTRAVENOUS EVERY 6 HOURS PRN
Status: DISCONTINUED | OUTPATIENT
Start: 2018-04-13 | End: 2018-04-16 | Stop reason: HOSPADM

## 2018-04-13 RX ORDER — PROPOFOL 10 MG/ML
INJECTION, EMULSION INTRAVENOUS AS NEEDED
Status: DISCONTINUED | OUTPATIENT
Start: 2018-04-13 | End: 2018-04-13 | Stop reason: SURG

## 2018-04-13 RX ADMIN — HYDRALAZINE HYDROCHLORIDE 10 MG: 20 INJECTION INTRAMUSCULAR; INTRAVENOUS at 15:59

## 2018-04-13 RX ADMIN — LOSARTAN POTASSIUM 50 MG: 50 TABLET ORAL at 15:59

## 2018-04-13 RX ADMIN — FENTANYL CITRATE 25 MCG: 50 INJECTION, SOLUTION INTRAMUSCULAR; INTRAVENOUS at 12:45

## 2018-04-13 RX ADMIN — SODIUM CHLORIDE 100 ML/HR: 0.9 INJECTION, SOLUTION INTRAVENOUS at 07:35

## 2018-04-13 RX ADMIN — LIDOCAINE HYDROCHLORIDE 60 MG: 10 INJECTION, SOLUTION INFILTRATION; PERINEURAL at 12:10

## 2018-04-13 RX ADMIN — PANTOPRAZOLE SODIUM 40 MG: 40 INJECTION, POWDER, FOR SOLUTION INTRAVENOUS at 20:50

## 2018-04-13 RX ADMIN — DEXAMETHASONE SODIUM PHOSPHATE 4 MG: 10 INJECTION INTRAMUSCULAR; INTRAVENOUS at 12:45

## 2018-04-13 RX ADMIN — ROCURONIUM BROMIDE 2 MG: 10 INJECTION INTRAVENOUS at 12:10

## 2018-04-13 RX ADMIN — OXYBUTYNIN CHLORIDE 5 MG: 5 TABLET ORAL at 14:32

## 2018-04-13 RX ADMIN — SUCCINYLCHOLINE CHLORIDE 40 MG: 20 INJECTION, SOLUTION INTRAMUSCULAR; INTRAVENOUS at 12:10

## 2018-04-13 RX ADMIN — PROPOFOL 50 MG: 10 INJECTION, EMULSION INTRAVENOUS at 12:10

## 2018-04-13 RX ADMIN — MORPHINE SULFATE 2 MG: 2 INJECTION, SOLUTION INTRAMUSCULAR; INTRAVENOUS at 14:32

## 2018-04-13 RX ADMIN — METOPROLOL TARTRATE 25 MG: 25 TABLET ORAL at 20:50

## 2018-04-13 RX ADMIN — PROPOFOL 50 MG: 10 INJECTION, EMULSION INTRAVENOUS at 12:15

## 2018-04-13 RX ADMIN — ONDANSETRON HYDROCHLORIDE 4 MG: 2 INJECTION, SOLUTION INTRAVENOUS at 12:45

## 2018-04-13 RX ADMIN — CEFTRIAXONE 1000 MG: 1 INJECTION, SOLUTION INTRAVENOUS at 15:59

## 2018-04-13 RX ADMIN — PANTOPRAZOLE SODIUM 40 MG: 40 INJECTION, POWDER, FOR SOLUTION INTRAVENOUS at 09:09

## 2018-04-13 NOTE — PHYSICAL THERAPY NOTE
PT NOTE:            Order received and chart review performed  Nursing requesting to hold PT evaluation due to low hgb and pt going to OR for dislocation of R shoulder  Will reattempt as able

## 2018-04-13 NOTE — ASSESSMENT & PLAN NOTE
Likely demand ischemia in the setting of profound anemia  Follow-up 2D cardiogram  Continue statin  Resume beta-blocker later today  Hold off on aspirin the setting of anemia

## 2018-04-13 NOTE — CASE MANAGEMENT
Initial Clinical Review    Admission: Date/Time/Statement: 4/12/18 @ 1458     Orders Placed This Encounter   Procedures    Inpatient Admission     Standing Status:   Standing     Number of Occurrences:   1     Order Specific Question:   Admitting Physician     Answer:   Za Davis     Order Specific Question:   Level of Care     Answer:   Med Surg [16]     Order Specific Question:   Estimated length of stay     Answer:   More than 2 Midnights     Order Specific Question:   Certification     Answer:   I certify that inpatient services are medically necessary for this patient for a duration of greater than two midnights  See H&P and MD Progress Notes for additional information about the patient's course of treatment  ED: Date/Time/Mode of Arrival:   ED Arrival Information     Expected Arrival Acuity Means of Arrival Escorted By Service Admission Type    - 4/12/2018 11:56 [facility] WITH [facility] General Medicine Emergency    Arrival Complaint    weakness      Chief Complaint:   Chief Complaint   Patient presents with    Weakness - Generalized     pt started with right sided weakness yesterday and had 2 unwitnessed falls today   History of Illness:   82-year-old female with remote history of stroke 10 years ago are yesterday was noted by family to have more generalized weakness today she sustained the same level fall x2 that was unwitnessed she apparently denied hitting her head both x2 family but was talking normally the contacted 911 and upon their arrival she was having difficulty with moving her right side and garbled speech was noted  No facial droop was noted Accu-Chek EN route was normal blood pressure was 136/89  Patient here denying any complaints of pain  She is unable to speak there is no facial droop  She is oriented to her name  She has equal hand   She is denying any shortness of breath or chest pain   Trauma eval called upon patient arrival  ED Vital Signs:   ED Triage Vitals   Temperature Pulse Respirations Blood Pressure SpO2   04/12/18 1200 04/12/18 1200 04/12/18 1200 04/12/18 1200 04/12/18 1200   98 5 °F (36 9 °C) 92 22 130/59 100 %      Temp Source Heart Rate Source Patient Position - Orthostatic VS BP Location FiO2 (%)   04/12/18 1200 04/12/18 1200 04/12/18 1215 04/12/18 1215 --   Temporal Monitor Lying Left arm       Pain Score       04/12/18 1215       5        Wt Readings from Last 1 Encounters:   04/13/18 49 1 kg (108 lb 3 9 oz)   Vital Signs (abnormal): T 99  Abnormal Labs/Diagnostic Test Results:   HGB 3 8  PT 16 7 INR 1 36 PTT 39  ANION GAP 17 BUN 49 GLUC 141 FERRITIN 6  Color, UA  Yellow     Clarity, UA  Clear     Specific Burton, UA 1 003 - 1 030 1 010     pH, UA 4 5 - 8 0 6 0     Leukocytes, UA Negative Small   A    Nitrite, UA Negative  Positive   A    Protein, UA Negative mg/dl  Negative     Glucose, UA Negative mg/dl  Negative     Ketones, UA Negative mg/dl  Negative     Urobilinogen, UA 0 2, 1 0 E U /dl E U /dl 0 2     Bilirubin, UA Negative  Negative     Blood, UA Negative, Trace-Intact Large   A      RBC, UA None Seen, 0-5 /hpf 4-10   A    WBC, UA None Seen, 0-5, 5-55, 5-65 /hpf 2-4   A    Epithelial Cells None Seen, Occasional /hpf  Occasional     Bacteria, UA None Seen, Occasional /hpf  Moderate   A    CTA HEAD& NECK=No acute intracranial disease  Footprint of advanced chronic small vessel disease and remote left parietal cortical/subcortical infarct are stable  Multifocal atherosclerotic disease with minor narrowing of both vertebral origins, and calculated 38% short segment stenosis right ICA  Left common carotid is occluded shortly beyond the origin, with reconstitution at the bifurcation and likely through ECA collaterals  Severe stenosis of the left ICA  Mild to moderate Multifocal intracranial stenoses  CT CHEST, A/P=Anterior dislocation right shoulder, age-indeterminate    Patient has a history of previous right shoulder dislocation per report  Old, multifocal osseous trauma as detailed above  Small bilateral water density pleural effusions  No acute traumatic findings seen in the abdomen or pelvis  XRAY R SHOULDER=Anterior dislocation of the right shoulder  Hill-Sachs deformity of the humeral head of indeterminate age  Small corticated osseous densities lateral to the glenoid could represent avulsion fractures of indeterminate age  These findings are new from   prior x-ray however  XRAY L SHOULDER=No acute osseous abnormality  CT R SHOULDER=1  Subcoracoid, anterior glenohumeral joint dislocation  2   Age-indeterminate Hill-Sachs deformity with several osseous fragments medial to the osseous glenoid  3   Small right pleural effusion  EKG=ECG rate:  93    ECG rate assessment: normal    Rhythm:     Rhythm: sinus rhythm    QRS:     QRS axis:  Normal  Comments:      No acute ischemic changes  ED Treatment:   Medication Administration from 04/12/2018 1156 to 04/12/2018 1614       Date/Time Order Dose Route Action Action by Comments     04/12/2018 1308 iohexol (OMNIPAQUE) 350 MG/ML injection (SINGLE-DOSE) 100 mL 100 mL Intravenous Given Almilton Hayden      04/12/2018 1610 cefTRIAXone (ROCEPHIN) IVPB (premix) 1,000 mg 0 mg Intravenous Neri Seaman RN      04/12/2018 1544 cefTRIAXone (ROCEPHIN) IVPB (premix) 1,000 mg 1,000 mg Intravenous New Delbert Howard RN      04/12/2018 1541 lidocaine (PF) (XYLOCAINE-MPF) 2 % injection 30 mL 30 mL Infiltration Given by Dragan Clinton RN given by Dr Tate Bravo      Past Medical/Surgical History:    Active Ambulatory Problems     Diagnosis Date Noted    Dislocation of right shoulder joint 11/29/2016    Acute respiratory failure ([address] ) 11/29/2016    HTN (hypertension) 12/01/2016    GI bleed 12/01/2016    Hyperlipidemia 12/01/2016    Atrial fibrillation ([address] ) 12/01/2016    Anemia 12/01/2016     Resolved Ambulatory Problems     Diagnosis Date Noted    No Resolved Ambulatory Problems     Past Medical History:   Diagnosis Date    Acute respiratory failure with hypoxia and hypercapnia (HCC)     Arthritis     Asthma     Hypertension     Stroke [facility])    Admitting Diagnosis: Weakness [R53 1]  Anemia [D64 9]  GI bleed [K92 2]  Anterior shoulder dislocation [S43 016A]  Frequent falls [R29 6]  Dislocation of right shoulder joint, sequela [S43 004S]  Age/Sex: 82 y o  female  Assessment/Plan:   Acute blood loss anemia secondary G I bleed vs  Vaginal bleeding  Transfuse 2 units PRBC  NPO, Gentle IVF  protonix iv bid  Monitor H&H closely   Consult G  I  Check Stool for occult blood  Consider transvaginal ultrasound  Essential Hypertension  Hold all antihypertensive medications  Trend troponin  Check Echo  Anterior Shoulder Dislocation  Consult Ortho   Acute cystitis without hematuria  Ceftriaxone  Follow cultures  Carotid Artery Disease  Continue statin  No aspirin secondary to profound anemia  LOS > 2 midnight    Admission Orders:  CCU  TRANSFUSE 2UPRBC'S  CONSULT ORTHO  PT/OT EVAL & TX  VENODYNES  CONSULT GI  NEURO CHECKS Q4H  Scheduled Meds:   Current Facility-Administered Medications:  cefTRIAXone 1,000 mg Intravenous Q24H Sophia Duran MD    oxybutynin 5 mg Oral Daily Sophia Duran MD    pantoprazole 40 mg Intravenous Q12H Agus Mary MD    sodium chloride 50 mL/hr Intravenous Once Alise Jaime MD Last Rate: 50 mL/hr (04/13/18 0909)     Continuous Infusions:    PRN Meds:

## 2018-04-13 NOTE — PROGRESS NOTES
Progress Note - Samantha Barnhart 1935, 82 y o  female MRN: [id number]    Unit/Bed#:  Encounter: [id number]    Primary Care Provider: Breonna Krause DO   Date and time admitted to hospital: 4/12/2018 11:59 AM        * Acute blood loss anemia   Assessment & Plan    Status post 2 units PRBCs  Hemoglobin slightly decreased  Will transfuse 2 units PRBCs in the setting of cardiac ischemia        GI bleed   Assessment & Plan    Protonix IV b i d  NPO  Gastroenterology consult  She did not complete colonoscopy last year, son was unsure of this  Vaginal prolapse   Assessment & Plan    I have discussed this in the possibility of a transvaginal ultrasound and given current instability of her condition and advanced age the son would like to hold off for now  Regardless the patient is hemodynamically stable and would like to be treated conservatively        Dislocation of right shoulder joint   Assessment & Plan    Follow up orthopedic recommendations        Elevated troponin   Assessment & Plan    Likely demand ischemia in the setting of profound anemia  Follow-up 2D cardiogram  Continue statin  Resume beta-blocker later today  Hold off on aspirin the setting of anemia            Progress Note - Samantha Barnhart 82 y o  female MRN: [id number]    Unit/Bed#: U 207 Encounter: [id number]    Subjective:   Seen and examined at bedside  Awake and alert, no distress, no complaints or events overnight    Objective:     Vitals:   Vitals:    04/13/18 0000   BP: 133/60   Pulse: 73   Resp: 17   Temp:    SpO2: 99%     Body mass index is 30 44 kg/m²      Intake/Output Summary (Last 24 hours) at 04/13/18 0845  Last data filed at 04/13/18 0735   Gross per 24 hour   Intake          1921 67 ml   Output             1925 ml   Net            -3 33 ml       Physical Exam:   /60   Pulse 73   Temp 98 9 °F (37 2 °C) (Temporal)   Resp 17   Ht 4' 2" (1 27 m)   Wt 49 1 kg (108 lb 3 9 oz)   SpO2 99%   BMI 30 44 kg/m² General appearance: alert and oriented, in no acute distress  Head: Normocephalic, without obvious abnormality, atraumatic  Lungs: clear to auscultation bilaterally  Heart: regular rate and rhythm, S1, S2 normal, no murmur, click, rub or gallop  Abdomen: soft, non-tender; bowel sounds normal; no masses,  no organomegaly  Extremities: extremities normal, warm and well-perfused; no cyanosis, clubbing, or edema  Pulses: 2+ and symmetric  Neurologic: Grossly normal     Invasive Devices     Peripheral Intravenous Line            Peripheral IV 04/12/18 Left Antecubital less than 1 day    Peripheral IV 04/12/18 Right Wrist less than 1 day          Drain            Urethral Catheter Latex;Straight-tip 16 Fr  less than 1 day                  Results from last 7 days  Lab Units 04/13/18  0459 04/13/18  0033 04/12/18  1935 04/12/18  1237   WBC Thousand/uL 7 67  --  12 27* 8 28   HEMOGLOBIN g/dL 7 0* 7 3* 4 8* 3 8*   HEMATOCRIT % 22 0* 23 1* 16 4* 14 5*   PLATELETS Thousands/uL 123*  --  118* 133*         Results from last 7 days  Lab Units 04/13/18  0459 04/12/18  1422 04/12/18  1240   SODIUM mmol/L 148* 145  --    POTASSIUM mmol/L 3 6 4 6  --    CHLORIDE mmol/L 115* 112*  --    CO2 mmol/L 23 23  --    BUN mg/dL 43* 49*  --    CREATININE mg/dL 1 04 1 17  --    CALCIUM mg/dL 8 0 8 1  --    TOTAL PROTEIN g/dL 5 4* 5 9*  --    BILIRUBIN TOTAL mg/dL 0 60 0 40  --    ALK PHOS U/L 66 72  --    ALT U/L 9* 14  --    AST U/L 14 12  --    GLUCOSE RANDOM mg/dL 103 139  --    GLUCOSE, ISTAT mg/dl  --   --  141*       Medication Administration - last 24 hours from 04/12/2018 0845 to 04/13/2018 0845       Date/Time Order Dose Route Action Action by     04/12/2018 1308 iohexol (OMNIPAQUE) 350 MG/ML injection (SINGLE-DOSE) 100 mL 100 mL Intravenous Given Zac Santiago     04/12/2018 1610 cefTRIAXone (ROCEPHIN) IVPB (premix) 1,000 mg 0 mg Intravenous Stopped Abimael Moseley RN     04/12/2018 1544 cefTRIAXone (ROCEPHIN) IVPB (premix) 1,000 mg 1,000 mg Intravenous New Bag Gisselle Clinton RN     04/13/2018 0735 sodium chloride 0 9 % infusion 100 mL/hr Intravenous New [address] [facility]     04/12/2018 1925 sodium chloride 0 9 % infusion 100 mL/hr Intravenous New [address] RN     04/12/2018 2040 pantoprazole (PROTONIX) injection 40 mg 40 mg Intravenous [facility]     04/12/2018 1541 lidocaine (PF) (XYLOCAINE-MPF) 2 % injection 30 mL 30 mL Infiltration Given by Other Gisselle Clinton RN            Lab, Imaging and other studies: I have personally reviewed pertinent reports      VTE Pharmacologic Prophylaxis: None 2/2 anemia  VTE Mechanical Prophylaxis: sequential compression device     Shavon Oviedo MD  0/39/8895,2:49 AM

## 2018-04-13 NOTE — DISCHARGE INSTRUCTIONS
Patient will use sling for comfort  Follow-up with Dr Ced Santos in the office after discharge from the hospital in 1-2 weeks

## 2018-04-13 NOTE — OCCUPATIONAL THERAPY NOTE
Occupational Therapy         Patient Name: Jeannine TIJERINA Date: 4/13/2018      Order received and chart review performed; pt is a HOLD from OT evaluation at this time due to hemo of 7 0 and possible OR today; will follow as able with OT evaluation

## 2018-04-13 NOTE — ASSESSMENT & PLAN NOTE
I have discussed this in the possibility of a transvaginal ultrasound and given current instability of her condition and advanced age the son would like to hold off for now      Regardless the patient is hemodynamically stable and would like to be treated conservatively

## 2018-04-13 NOTE — PLAN OF CARE
Problem: Potential for Falls  Goal: Patient will remain free of falls  INTERVENTIONS:  - Assess patient frequently for physical needs  -  Identify cognitive and physical deficits and behaviors that affect risk of falls    -  [place] fall precautions as indicated by assessment   - Educate patient/family on patient safety including physical limitations  - Instruct patient to call for assistance with activity based on assessment  - Modify environment to reduce risk of injury  - Consider OT/PT consult to assist with strengthening/mobility   Outcome: Progressing      Problem: Prexisting or High Potential for Compromised Skin Integrity  Goal: Skin integrity is maintained or improved  INTERVENTIONS:  - Identify patients at risk for skin breakdown  - Assess and monitor skin integrity  - Assess and monitor nutrition and hydration status  - Monitor labs (i e  albumin)  - Assess for incontinence   - Turn and reposition patient  - Assist with mobility/ambulation  - Relieve pressure over bony prominences  - Avoid friction and shearing  - Provide appropriate hygiene as needed including keeping skin clean and dry  - Evaluate need for skin moisturizer/barrier cream  - Collaborate with interdisciplinary team (i e  Nutrition, Rehabilitation, etc )   - Patient/family teaching    Outcome: Progressing      Problem: PAIN - ADULT  Goal: Verbalizes/displays adequate comfort level or baseline comfort level  Interventions:  - Encourage patient to monitor pain and request assistance  - Assess pain using appropriate pain scale  - Administer analgesics based on type and severity of pain and evaluate response  - Implement non-pharmacological measures as appropriate and evaluate response  - Consider cultural and social influences on pain and pain management  - Notify physician/advanced practitioner if interventions unsuccessful or patient reports new pain   Outcome: Progressing      Problem: INFECTION - ADULT  Goal: Absence or prevention of progression during hospitalization  INTERVENTIONS:  - Assess and monitor for signs and symptoms of infection  - Monitor lab/diagnostic results  - Monitor all insertion sites, i e  indwelling lines, tubes, and drains  - Monitor nasal secretions for changes in amount and color  - [place] appropriate cooling/warming therapies per order  - Administer medications as ordered  - Instruct and encourage patient and family to use good hand hygiene technique   Outcome: Progressing      Problem: SAFETY ADULT  Goal: Maintain or return to baseline ADL function  INTERVENTIONS:  -  Assess patient's ability to carry out ADLs; assess patient's baseline for ADL function and identify physical deficits which impact ability to perform ADLs (bathing, care of mouth/teeth, toileting, grooming, dressing, etc )  - Assess/evaluate cause of self-care deficits   - Assess range of motion  - Assess patient's mobility; develop plan if impaired  - Assess patient's need for assistive devices and provide as appropriate  - Encourage maximum independence but intervene and supervise when necessary  ¯ Involve family in performance of ADLs  ¯ Assess for home care needs following discharge   ¯ Request OT consult to assist with ADL evaluation and planning for discharge  ¯ Provide patient education as appropriate   Outcome: Progressing    Goal: Maintain or return mobility status to optimal level  INTERVENTIONS:  - Assess patient's baseline mobility status (ambulation, transfers, stairs, etc )    - Identify cognitive and physical deficits and behaviors that affect mobility  - Identify mobility aids required to assist with transfers and/or ambulation (gait belt, sit-to-stand, lift, walker, cane, etc )  - [place] fall precautions as indicated by assessment  - Record patient progress and toleration of activity level on Mobility SBAR; progress patient to next Phase/Stage  - Instruct patient to call for assistance with activity based on assessment  - Request Rehabilitation consult to assist with strengthening/weightbearing, etc    Outcome: Progressing      Problem: DISCHARGE PLANNING  Goal: Discharge to home or other facility with appropriate resources  INTERVENTIONS:  - Identify barriers to discharge w/patient and caregiver  - Arrange for needed discharge resources and transportation as appropriate  - Identify discharge learning needs (meds, wound care, etc )  - Refer to Case Management Department for coordinating discharge planning if the patient needs post-hospital services based on physician/advanced practitioner order or complex needs related to functional status, cognitive ability, or social support system   Outcome: Progressing      Problem: Knowledge Deficit  Goal: Patient/family/caregiver demonstrates understanding of disease process, treatment plan, medications, and discharge instructions  Complete learning assessment and assess knowledge base  Interventions:  - Provide teaching at level of understanding  - Provide teaching via preferred learning methods   Outcome: Progressing      Problem: CARDIOVASCULAR - ADULT  Goal: Maintains optimal cardiac output and hemodynamic stability  INTERVENTIONS:  - Monitor I/O, vital signs and rhythm  - Monitor for S/S and trends of decreased cardiac output i e  bleeding, hypotension  - Assess quality of pulses, skin color and temperature  - Assess for signs of decreased coronary artery perfusion - ex   Angina  - Instruct patient to report change in severity of symptoms   Outcome: Progressing      Problem: GASTROINTESTINAL - ADULT  Goal: Maintains or returns to baseline bowel function  INTERVENTIONS:  - Assess bowel function  - Encourage oral fluids to ensure adequate hydration  - Administer IV fluids as ordered to ensure adequate hydration  - Administer ordered medications as needed  - Encourage mobilization and activity  - Nutrition services referral to assist patient with appropriate food choices   Outcome: Progressing    Goal: Maintains adequate nutritional intake  INTERVENTIONS:  - Monitor percentage of each meal consumed  - Identify factors contributing to decreased intake, treat as appropriate  - Assist with meals as needed  - Monitor I&O, WT and lab values  - Obtain nutrition services referral as needed   Outcome: Progressing      Problem: GENITOURINARY - ADULT  Goal: Urinary catheter remains patent  INTERVENTIONS:  - Assess patency of urinary catheter  - Follow guidelines for intermittent irrigation of non-functioning urinary catheter   Outcome: Progressing      Problem: METABOLIC, FLUID AND ELECTROLYTES - ADULT  Goal: Fluid balance maintained  INTERVENTIONS:  - Monitor labs and assess for signs and symptoms of volume excess or deficit  - Monitor I/O and WT  - Instruct patient on fluid and nutrition as appropriate   Outcome: Progressing      Problem: SKIN/TISSUE INTEGRITY - ADULT  Goal: Incision(s), wounds(s) or drain site(s) healing without S/S of infection  INTERVENTIONS  - Assess and document risk factors for skin impairment   - Assess and document dressing, incision, wound bed, drain sites and surrounding tissue  - Initiate Nutrition services consult and/or wound management as needed   Outcome: Progressing    Goal: Oral mucous membranes remain intact  INTERVENTIONS  - Assess oral mucosa and hygiene practices  - Implement preventative oral hygiene regimen  - Initiate Nutrition services referral as needed    Outcome: Progressing      Problem: HEMATOLOGIC - ADULT  Goal: Maintains hematologic stability  INTERVENTIONS  - Assess for signs and symptoms of bleeding or hemorrhage  - Monitor labs  - Administer supportive blood products/factors as ordered and appropriate   Outcome: Progressing      Problem: MUSCULOSKELETAL - ADULT  Goal: Maintain or return mobility to safest level of function  INTERVENTIONS:  - Assess patient's ability to carry out ADLs; assess patient's baseline for ADL function and identify physical deficits which impact ability to perform ADLs (bathing, care of mouth/teeth, toileting, grooming, dressing, etc )  - Assess/evaluate cause of self-care deficits   - Assess range of motion  - Assess patient's mobility; develop plan if impaired  - Assess patient's need for assistive devices and provide as appropriate  - Encourage maximum independence but intervene and supervise when necessary  - Involve family in performance of ADLs  - Assess for home care needs following discharge   - Request OT consult to assist with ADL evaluation and planning for discharge  - Provide patient education as appropriate   Outcome: Progressing    Goal: Maintain proper alignment of affected body part  INTERVENTIONS:  - Support, maintain and protect limb and body alignment  - Provide pt/fam with appropriate education   Outcome: Progressing      Problem: DISCHARGE PLANNING - CARE MANAGEMENT  Goal: Discharge to post-acute care or home with appropriate resources  INTERVENTIONS:  - Conduct assessment to determine patient/family and health care team treatment goals, and need for post-acute services based on payer coverage, community resources, and patient preferences, and barriers to discharge  - Address psychosocial, clinical, and financial barriers to discharge as identified in assessment in conjunction with the patient/family and health care team  - Arrange appropriate level of post-acute services according to patient's   needs and preference and payer coverage in collaboration with the physician and health care team  - Communicate with and update the patient/family, physician, and health care team regarding progress on the discharge plan  - Arrange appropriate transportation to post-acute venues   Outcome: Progressing

## 2018-04-13 NOTE — ANESTHESIA POSTPROCEDURE EVALUATION
Post-Op Assessment Note      CV Status:  Stable    Mental Status:  Alert and awake    Hydration Status:  Euvolemic    PONV Controlled:  Controlled    Airway Patency:  Patent    Post Op Vitals Reviewed: Yes          Staff: CRNA           BP (!) 172/84 (04/13/18 1254)    Temp 98 7 °F (37 1 °C) (04/13/18 1254)    Pulse 79 (04/13/18 1254)   Resp (!) 39 (04/13/18 1254)    SpO2 100 % (04/13/18 1254)

## 2018-04-13 NOTE — ASSESSMENT & PLAN NOTE
Status post 2 units PRBCs  Hemoglobin slightly decreased  Will transfuse 2 units PRBCs in the setting of cardiac ischemia

## 2018-04-13 NOTE — ASSESSMENT & PLAN NOTE
Protonix IV b i d  NPO  Gastroenterology consult  She did not complete colonoscopy last year, son was unsure of this

## 2018-04-13 NOTE — OP NOTE
OPERATIVE REPORT  PATIENT NAME: Yajaira Cloud    :  1935  MRN: [id number]  Pt Location: [facility] OR ROOM 02    SURGERY DATE: 2018    Surgeon(s) and Role:     * Adolph Copeland MD - Primary    Preop Diagnosis:  Dislocation of right shoulder joint, initial encounter [S43 004A]    Post-Op Diagnosis Codes:     * Dislocation of right shoulder joint, initial encounter [S43 004A]    Procedure(s) (LRB):  CLOSED REDUCTION ARM/HUMERUS (Right)    Specimen(s):  * No specimens in log *    Estimated Blood Loss:   Minimal    Drains:  Urethral Catheter Latex;Straight-tip 16 Fr  (Active)   Site Assessment Clean;Skin intact 2018 12:04 PM   Collection Container Standard drainage bag 2018 12:04 PM   Securement Method Securing device (Describe) 2018 12:04 PM   Output (mL) 300 mL 2018  5:17 AM   Number of days: 1       Anesthesia Type:   General anesthesia    Operative Indications:  Dislocation of right shoulder joint, initial encounter [S43 004A]    Operative Findings:  Shoulder reduced with difficulty but very unstable and tended to sublux out again    Complications:   None    Procedure and Technique:  Informed consent obtained from the patient and the son  Patient had an initial dislocation 2 years ago which was reduced closed  Patient was doing well  Probably 2 months ago patient fell again at home and injured her right shoulder and has been using Aleve  She was brought into the emergency room yesterday with the diagnosis of dislocated shoulder  Closed reduction in the ER failed  Informed consent was obtained from the patient and son  After adequate anesthesia was induced was able to reduce the shoulder would not stay stable in the glenoid secondary to her large Hill-Sachs lesion  For now shoulder has been relocated  Discussed treatment with the son  Explained that the more than likely she will need a reverse shoulder arthroplasty or an  open stability the procedure   They do not want any surgical intervention at present     I was present for the entire procedure    Patient Disposition:  PACU     SIGNATURE: Jc Corado MD  DATE: April 13, 2018  TIME: 12:29 PM

## 2018-04-13 NOTE — ANESTHESIA PREPROCEDURE EVALUATION
Review of Systems/Medical History  Patient summary reviewed  Chart reviewed      Cardiovascular  EKG reviewed, Exercise tolerance: poor,  Hyperlipidemia, Hypertension controlled, Dysrhythmias (hx AFib with RVR 2016), atrial fibrillation,   Comment: NSR no ischemic changes  Troponin 0 08 (most likely demand ischemia from anemia)  Nov 2016:  LEFT VENTRICLE:  Systolic function was normal  Ejection fraction was estimated in the range of  60 % to 65 %  There were no regional wall motion abnormalities  Wall thickness was mildly increased  Doppler parameters were consistent with abnormal left ventricular relaxation  (grade 1 diastolic dysfunction)      MITRAL VALVE:  There was mild to moderate annular calcification  There was mild regurgitation      AORTIC VALVE:  There was mild stenosis  There was mild to moderate regurgitation  Estimated aortic valve area (by VTI) was 1 62 cm squared      TRICUSPID VALVE:  There was moderate regurgitation  Estimated peak PA pressure was 56 mmHg    ,  Pulmonary  Asthma: well controlled/ stable ,        GI/Hepatic            Endo/Other     GYN       Hematology  Anemia chronic blood loss anemia,    Comment: From ileocecal ulcer Musculoskeletal    Arthritis     Neurology    CVA (RLE weakness) , residual symptoms,    Psychology           Physical Exam    Airway    Mallampati score: II  TM Distance: >3 FB  Neck ROM: limited     Dental   Comment: Few missing teeth; denies loose teeth,     Cardiovascular  Cardiovascular exam normal    Pulmonary  Pulmonary exam normal     Other Findings        Anesthesia Plan  ASA Score- 4     Anesthesia Type- general with ASA Monitors  Additional Monitors:   Airway Plan: ETT  Plan Factors-    Induction- intravenous  Postoperative Plan-     Informed Consent- Anesthetic plan and risks discussed with patient and son

## 2018-04-13 NOTE — PLAN OF CARE
CARDIOVASCULAR - ADULT     Maintains optimal cardiac output and hemodynamic stability Progressing        DISCHARGE PLANNING     Discharge to home or other facility with appropriate resources Progressing        GASTROINTESTINAL - ADULT     Maintains or returns to baseline bowel function Progressing     Maintains adequate nutritional intake Progressing        GENITOURINARY - ADULT     Urinary catheter remains patent Progressing        HEMATOLOGIC - ADULT     Maintains hematologic stability Progressing        INFECTION - ADULT     Absence or prevention of progression during hospitalization Progressing        Knowledge Deficit     Patient/family/caregiver demonstrates understanding of disease process, treatment plan, medications, and discharge instructions Progressing        METABOLIC, FLUID AND ELECTROLYTES - ADULT     Fluid balance maintained Progressing        MUSCULOSKELETAL - ADULT     Maintain or return mobility to safest level of function Progressing     Maintain proper alignment of affected body part Progressing        PAIN - ADULT     Verbalizes/displays adequate comfort level or baseline comfort level Progressing        Potential for Falls     Patient will remain free of falls Progressing        Prexisting or High Potential for Compromised Skin Integrity     Skin integrity is maintained or improved Progressing        SAFETY ADULT     Maintain or return to baseline ADL function Progressing     Maintain or return mobility status to optimal level Progressing        SKIN/TISSUE INTEGRITY - ADULT     Incision(s), wounds(s) or drain site(s) healing without S/S of infection Progressing     Oral mucous membranes remain intact Progressing

## 2018-04-13 NOTE — SOCIAL WORK
I spoke with the patient and she is at home with her son Luis Bryan and her sons wife and she told me that they assist her as needed I was unable to understand her living set up but she has no services in the home and he family takes care of her  No o2 at home  I will speak to the family when they come in

## 2018-04-14 LAB
ANION GAP SERPL CALCULATED.3IONS-SCNC: 8 MMOL/L (ref 4–13)
BASOPHILS # BLD AUTO: 0.02 THOUSANDS/ΜL (ref 0–0.1)
BASOPHILS NFR BLD AUTO: 0 % (ref 0–1)
BUN SERPL-MCNC: 30 MG/DL (ref 5–25)
CALCIUM SERPL-MCNC: 7.9 MG/DL
CHLORIDE SERPL-SCNC: 115 MMOL/L (ref 100–108)
CO2 SERPL-SCNC: 24 MMOL/L (ref 21–32)
CREAT SERPL-MCNC: 1.08 MG/DL (ref 0.6–1.3)
EOSINOPHIL # BLD AUTO: 0.01 THOUSAND/ΜL (ref 0–0.61)
EOSINOPHIL NFR BLD AUTO: 0 % (ref 0–6)
ERYTHROCYTE [DISTWIDTH] IN BLOOD BY AUTOMATED COUNT: 20.2 % (ref 11.6–15.1)
GFR SERPL CREATININE-BSD FRML MDRD: 48 ML/MIN/1.73SQ M
GLUCOSE SERPL-MCNC: 124 MG/DL (ref 65–140)
HCT VFR BLD AUTO: 31.8 % (ref 34.8–46.1)
HGB BLD-MCNC: 10.6 G/DL (ref 11.5–15.4)
HIV 1+2 AB+HIV1 P24 AG SERPL QL IA: NORMAL
HIV1 P24 AG SER QL: NORMAL
LYMPHOCYTES # BLD AUTO: 1.17 THOUSANDS/ΜL (ref 0.6–4.47)
LYMPHOCYTES NFR BLD AUTO: 14 % (ref 14–44)
MAGNESIUM SERPL-MCNC: 2 MG/DL (ref 1.6–2.6)
MCH RBC QN AUTO: 25.6 PG (ref 26.8–34.3)
MCHC RBC AUTO-ENTMCNC: 33.3 G/DL (ref 31.4–37.4)
MCV RBC AUTO: 77 FL (ref 82–98)
MONOCYTES # BLD AUTO: 0.57 THOUSAND/ΜL (ref 0.17–1.22)
MONOCYTES NFR BLD AUTO: 7 % (ref 4–12)
NEUTROPHILS # BLD AUTO: 6.83 THOUSANDS/ΜL (ref 1.85–7.62)
NEUTS SEG NFR BLD AUTO: 79 % (ref 43–75)
PHOSPHATE SERPL-MCNC: 3.1 MG/DL (ref 2.3–4.1)
PLATELET # BLD AUTO: 106 THOUSANDS/UL (ref 149–390)
POTASSIUM SERPL-SCNC: 4 MMOL/L (ref 3.5–5.3)
RBC # BLD AUTO: 4.14 MILLION/UL (ref 3.81–5.12)
SODIUM SERPL-SCNC: 147 MMOL/L (ref 136–145)
WBC # BLD AUTO: 8.6 THOUSAND/UL (ref 4.31–10.16)

## 2018-04-14 PROCEDURE — 84100 ASSAY OF PHOSPHORUS: CPT | Performed by: FAMILY MEDICINE

## 2018-04-14 PROCEDURE — 85025 COMPLETE CBC W/AUTO DIFF WBC: CPT | Performed by: FAMILY MEDICINE

## 2018-04-14 PROCEDURE — 99232 SBSQ HOSP IP/OBS MODERATE 35: CPT | Performed by: FAMILY MEDICINE

## 2018-04-14 PROCEDURE — 83735 ASSAY OF MAGNESIUM: CPT | Performed by: FAMILY MEDICINE

## 2018-04-14 PROCEDURE — C9113 INJ PANTOPRAZOLE SODIUM, VIA: HCPCS | Performed by: FAMILY MEDICINE

## 2018-04-14 PROCEDURE — 86803 HEPATITIS C AB TEST: CPT | Performed by: INTERNAL MEDICINE

## 2018-04-14 PROCEDURE — 80048 BASIC METABOLIC PNL TOTAL CA: CPT | Performed by: FAMILY MEDICINE

## 2018-04-14 PROCEDURE — 87340 HEPATITIS B SURFACE AG IA: CPT | Performed by: INTERNAL MEDICINE

## 2018-04-14 PROCEDURE — 87806 HIV AG W/HIV1&2 ANTB W/OPTIC: CPT | Performed by: INTERNAL MEDICINE

## 2018-04-14 RX ADMIN — PANTOPRAZOLE SODIUM 40 MG: 40 INJECTION, POWDER, FOR SOLUTION INTRAVENOUS at 10:04

## 2018-04-14 RX ADMIN — CEFTRIAXONE 1000 MG: 1 INJECTION, SOLUTION INTRAVENOUS at 17:25

## 2018-04-14 RX ADMIN — HYDRALAZINE HYDROCHLORIDE 10 MG: 20 INJECTION INTRAMUSCULAR; INTRAVENOUS at 23:40

## 2018-04-14 RX ADMIN — METOPROLOL TARTRATE 25 MG: 25 TABLET ORAL at 10:04

## 2018-04-14 RX ADMIN — METOPROLOL TARTRATE 25 MG: 25 TABLET ORAL at 20:42

## 2018-04-14 RX ADMIN — LOSARTAN POTASSIUM 50 MG: 50 TABLET ORAL at 10:04

## 2018-04-14 RX ADMIN — OXYBUTYNIN CHLORIDE 5 MG: 5 TABLET ORAL at 10:04

## 2018-04-14 RX ADMIN — PANTOPRAZOLE SODIUM 40 MG: 40 INJECTION, POWDER, FOR SOLUTION INTRAVENOUS at 20:43

## 2018-04-14 NOTE — ASSESSMENT & PLAN NOTE
Patient was taken to the operating room however the shoulder is easily subluxed  Case was discussed with the patient's family and Orthopedics  The have opted for conservative management and will follow up as an outpatient with Dr Amador

## 2018-04-14 NOTE — PHYSICAL THERAPY NOTE
Physical Therapy Cancellation Note  I spoke with Ashkan Hall twice today, on both occasions she declined therapy due to fatigue and shoulder discomfort  Will attempt to initiate PT at a later time

## 2018-04-14 NOTE — PROGRESS NOTES
Progress Note - Almaz Meals 1935, 82 y o  female MRN: [id number]    Unit/Bed#:  Encounter: [id number]    Primary Care Provider: Es Moore DO   Date and time admitted to hospital: 4/12/2018 11:59 AM        * Acute blood loss anemia   Assessment & Plan    Status post 4 units PRBCs  Hemoglobin stable  Family has opted for conservative approach and will defer invasive testing (transvaginal ultrasound, EGD, colonoscopy) if patient were to become unstable        GI bleed   Assessment & Plan    Advance diet  Appreciate Gastroenterology input  Protonix p o  b i d  Vaginal prolapse   Assessment & Plan    I have discussed this in the possibility of a transvaginal ultrasound and given current instability of her condition and advanced age the son would like to hold off for now  Regardless the patient is hemodynamically stable and would like to be treated conservatively        Dislocation of right shoulder joint   Assessment & Plan    Patient was taken to the operating room however the shoulder is easily subluxed  Case was discussed with the patient's family and Orthopedics  The have opted for conservative management and will follow up as an outpatient with Dr Amador            Progress Note - Almaz Meals 82 y o  female MRN: [id number]    Unit/Bed#: U 207 Encounter: [id number]        Subjective:   Seen and examined at bedside, has no acute complaints, resting comfortably    Objective:     Vitals:   Vitals:    04/14/18 0026   BP: 133/63   Pulse: 76   Resp: (!) 25   Temp: 99 °F (37 2 °C)   SpO2: 97%     Body mass index is 31 62 kg/m²      Intake/Output Summary (Last 24 hours) at 04/14/18 0823  Last data filed at 04/14/18 0616   Gross per 24 hour   Intake             1190 ml   Output              775 ml   Net              415 ml       Physical Exam:   /63 (BP Location: Left arm)   Pulse 76   Temp 99 °F (37 2 °C) (Temporal)   Resp (!) 25   Ht 4' 2" (1 27 m)   Wt 51 kg (112 lb 7 oz) SpO2 97%   BMI 31 62 kg/m²   General appearance: alert and oriented, in no acute distress  Head: Normocephalic, without obvious abnormality, atraumatic  Lungs: clear to auscultation bilaterally  Heart: regular rate and rhythm, S1, S2 normal, no murmur, click, rub or gallop  Abdomen: soft, non-tender; bowel sounds normal; no masses,  no organomegaly  Extremities: extremities normal, warm and well-perfused; no cyanosis, clubbing, or edema  Pulses: 2+ and symmetric  Neurologic: Grossly normal     Invasive Devices     Peripheral Intravenous Line            Peripheral IV 04/12/18 Right Wrist 1 day    Peripheral IV 04/13/18 Left Hand less than 1 day          Drain            Urethral Catheter Latex;Straight-tip 16 Fr  1 day                  Results from last 7 days  Lab Units 04/14/18  0444 04/13/18  0459 04/13/18  0033 04/12/18  1935   WBC Thousand/uL 8 60 7 67  --  12 27*   HEMOGLOBIN g/dL 10 6* 7 0* 7 3* 4 8*   HEMATOCRIT % 31 8* 22 0* 23 1* 16 4*   PLATELETS Thousands/uL 106* 123*  --  118*         Results from last 7 days  Lab Units 04/14/18  0444 04/13/18  0459 04/12/18  1422   SODIUM mmol/L 147* 148* 145   POTASSIUM mmol/L 4 0 3 6 4 6   CHLORIDE mmol/L 115* 115* 112*   CO2 mmol/L 24 23 23   BUN mg/dL 30* 43* 49*   CREATININE mg/dL 1 08 1 04 1 17   CALCIUM mg/dL 7 9 8 0 8 1   TOTAL PROTEIN g/dL  --  5 4* 5 9*   BILIRUBIN TOTAL mg/dL  --  0 60 0 40   ALK PHOS U/L  --  66 72   ALT U/L  --  9* 14   AST U/L  --  14 12   GLUCOSE RANDOM mg/dL 124 103 139       Medication Administration - last 24 hours from 04/13/2018 0823 to 04/14/2018 9420       Date/Time Order Dose Route Action Action by     04/13/2018 1432 oxybutynin (DITROPAN) tablet 5 mg 5 mg Oral Given Javier Lazcano RN     04/13/2018 1559 cefTRIAXone (ROCEPHIN) IVPB (premix) 1,000 mg 1,000 mg Intravenous Rupinder 37 Javier Lazcano RN     04/13/2018 1355 cefTRIAXone (ROCEPHIN) IVPB (premix) 1,000 mg   Intravenous KEVIN Jackson RN     04/13/2018 1155 cefTRIAXone (ROCEPHIN) IVPB (premix) 1,000 mg   Intravenous MAR Hold Automatic Transfer Provider     04/13/2018 0908 sodium chloride 0 9 % infusion 0 mL/hr Intravenous Stopped Kayla Petit RN     04/13/2018 2050 pantoprazole (PROTONIX) injection 40 mg 40 mg Intravenous Given Dylon Asif RN     04/13/2018 1355 pantoprazole (PROTONIX) injection 40 mg   Intravenous MAR Unhold Kayla Petit RN     04/13/2018 1155 pantoprazole (PROTONIX) injection 40 mg   Intravenous MAR Hold Automatic Transfer Provider     04/13/2018 0909 pantoprazole (PROTONIX) injection 40 mg 40 mg Intravenous Given Kayla Petit, KEKE     04/13/2018 1355 sodium chloride 0 9 % infusion   Intravenous MAR Unhold Kayla Petit RN     04/13/2018 1247 sodium chloride 0 9 % infusion   Intravenous Anesthesia Volume Adjustment Errol Perla CRNA     04/13/2018 1155 sodium chloride 0 9 % infusion   Intravenous MAR Hold Automatic Transfer Provider     04/13/2018 0910 sodium chloride 0 9 % infusion 50 mL/hr Intravenous Not Given Kayla Petit, RN     04/13/2018 8368 sodium chloride 0 9 % infusion 50 mL/hr Intravenous Restarted Kayla Petit, KEKE     04/13/2018 1432 morphine injection 2 mg 2 mg Intravenous Given Kayla Petit RN     04/13/2018 1559 losartan (COZAAR) tablet 50 mg 50 mg Oral Given Kayla Petit RN     04/13/2018 2050 metoprolol tartrate (LOPRESSOR) tablet 25 mg 25 mg Oral Given Dylon Asif RN     04/13/2018 1559 hydrALAZINE (APRESOLINE) injection 10 mg 10 mg Intravenous Given Kayla Petit RN            Lab, Imaging and other studies: I have personally reviewed pertinent reports      VTE Pharmacologic Prophylaxis:  None secondary to acute blood loss anemia  VTE Mechanical Prophylaxis: sequential compression device     Andreina Delong MD  4/14/2018,8:23 AM

## 2018-04-14 NOTE — ASSESSMENT & PLAN NOTE
Status post 4 units PRBCs  Hemoglobin stable  Family has opted for conservative approach and will defer invasive testing (transvaginal ultrasound, EGD, colonoscopy) if patient were to become unstable

## 2018-04-14 NOTE — CONSULTS
Consultation - [address] [facility]  Amber Shanae 82 y o  female MRN: [id number]  Unit/Bed#:  Encounter: [id number]        Consults    Reason for Consult / Principal Problem:   Severe anemia with heme-positive stool  HPI:  61-year-old lady who presented to the hospital with a fall due to weakness  She was found have a hemoglobin of 3 8  Her son who lives with her states that she did not have any bleeding, melena, black stools, red stools, hematemesis  He did notice that every so often when she is taking a shower/bath she has a prolapse of either the rectum or the vagina  He has noticed this since he helps her bathe  He also noticed a small amount of blood in this area of prolapse  However it has not been any large amount of bleeding  When she came the hospital her hemoglobin was 3 8  He was transfused 2 units and hemoglobin came up to 7 3  She is getting another 2 units at this time  She also underwent surgery for her right shoulder dislocation from the fall  At this time she denies having any abdominal pain, nausea, vomiting, diarrhea constipation or rectal bleeding  She does have some mild dementia  I also spoke with a nurse and she denies having seen any bleeding  She does not take any NSAIDs  REVIEW OF SYSTEMS:     CONSTITUTIONAL: Denies any fever, chills, or rigors  Good appetite, and no recent weight loss  HEENT: No earache or tinnitus  Denies hearing loss or visual disturbances  CARDIOVASCULAR: No chest pain or palpitations  RESPIRATORY: Denies any cough, hemoptysis, shortness of breath or dyspnea on exertion  GASTROINTESTINAL: As noted in the History of Present Illness  GENITOURINARY: No problems with urination  Denies any hematuria or dysuria  NEUROLOGIC: No dizziness or vertigo, denies headaches  MUSCULOSKELETAL: Denies any muscle or joint pain  SKIN: Denies skin rashes or itching     ENDOCRINE: Denies excessive thirst  Denies intolerance to heat or cold   PSYCHOSOCIAL: Denies depression or anxiety  Denies any recent memory loss         Historical Information   Past Medical History:   Diagnosis Date    Acute respiratory failure with hypoxia and hypercapnia (HCC)     Arthritis     Asthma     Hypertension     Stroke ([address] )     residual RLE weakness     Past Surgical History:   Procedure Laterality Date    CLOSED REDUCTION HUMERUS FRACTURE Right 11/29/2016    Procedure: CLOSED REDUCTION ARM/HUMERUS  CLOSED VS  OPEN SHOULDER REDUCTION;  Surgeon: Della Ramirez MD;  Location: [facility];  Service:     EYE SURGERY      bilateral CAT EXT W/IOLs    RECTAL PROLAPSE REPAIR      done 5-6 yrs ago     Social History   History   Alcohol Use No     History   Drug Use No     History   Smoking Status    Never Smoker   Smokeless Tobacco    Never Used     Family History   Problem Relation Age of Onset    Hypertension Son        Meds/Allergies     Prescriptions Prior to Admission   Medication    atorvastatin (LIPITOR) 20 mg tablet    celecoxib (CeleBREX) 100 mg capsule    doxazosin (CARDURA) 4 mg tablet    losartan-hydrochlorothiazide (HYZAAR) 100-12 5 MG per tablet    metoprolol tartrate (LOPRESSOR) 25 mg tablet    oxybutynin (DITROPAN) 5 mg tablet     Current Facility-Administered Medications   Medication Dose Route Frequency    cefTRIAXone (ROCEPHIN) IVPB (premix) 1,000 mg  1,000 mg Intravenous Q24H    hydrALAZINE (APRESOLINE) injection 10 mg  10 mg Intravenous Q6H PRN    losartan (COZAAR) tablet 50 mg  50 mg Oral Daily    metoprolol tartrate (LOPRESSOR) tablet 25 mg  25 mg Oral Q12H [facility]    morphine injection 2 mg  2 mg Intravenous Q4H PRN    oxybutynin (DITROPAN) tablet 5 mg  5 mg Oral Daily    pantoprazole (PROTONIX) injection 40 mg  40 mg Intravenous Q12H [facility]    sodium chloride 0 9 % infusion  50 mL/hr Intravenous Once       No Known Allergies        Objective     Blood pressure 163/71, pulse 90, temperature 97 9 °F (36 6 °C), temperature source Oral, resp  rate (!) 24, height 4' 2" (1 27 m), weight 49 1 kg (108 lb 3 9 oz), SpO2 96 %  Intake/Output Summary (Last 24 hours) at 04/13/18 2304  Last data filed at 04/13/18 1814   Gross per 24 hour   Intake          2406 67 ml   Output             1050 ml   Net          1356 67 ml         PHYSICAL EXAM:        General Appearance:   Alert, cooperative, no distress, appears stated age    HEENT:   Normocephalic, atraumatic, anicteric      Neck:  Supple, symmetrical, trachea midline, no adenopathy;    thyroid: no enlargement/tenderness/nodules; no carotid  bruit or JVD    Lungs:   Clear to auscultation bilaterally; no rales, rhonchi or wheezing; respirations unlabored    Heart[de-identified]   S1 and S2 normal; regular rate and rhythm; no murmur, rub, or gallop     Abdomen:   Soft, non-tender, non-distended; normal bowel sounds; no masses, no organomegaly    Genitalia:   Deferred    Rectal:   Deferred    Extremities:  Right shoulder in a sling    Pulses:  2+ and symmetric all extremities    Skin:  Skin color, texture, turgor normal, no rashes or lesions    Lymph nodes:  No palpable cervical, axillary or inguinal lymphadenopathy        Lab Results:   Admission on 04/12/2018   Component Date Value    WBC 04/12/2018 8 28     RBC 04/12/2018 2 18*    Hemoglobin 04/12/2018 3 8*    Hematocrit 04/12/2018 14 5*    MCV 04/12/2018 67*    MCH 04/12/2018 17 4*    MCHC 04/12/2018 26 2*    RDW 04/12/2018 21 6*    Platelets 58/91/4372 133*    Neutrophils Relative 04/12/2018 83*    Lymphocytes Relative 04/12/2018 12*    Monocytes Relative 04/12/2018 4     Eosinophils Relative 04/12/2018 0     Basophils Relative 04/12/2018 1     Neutrophils Absolute 04/12/2018 6 94     Lymphocytes Absolute 04/12/2018 0 95     Monocytes Absolute 04/12/2018 0 34     Eosinophils Absolute 04/12/2018 0 01     Basophils Absolute 04/12/2018 0 04     Sodium 04/12/2018 145     Potassium 04/12/2018 4 6     Chloride 04/12/2018 112*    CO2 04/12/2018 23  Anion Gap 04/12/2018 10     BUN 04/12/2018 49*    Creatinine 04/12/2018 1 17     Glucose 04/12/2018 139     Calcium 04/12/2018 8 1     AST 04/12/2018 12     ALT 04/12/2018 14     Alkaline Phosphatase 04/12/2018 72     Total Protein 04/12/2018 5 9*    Albumin 04/12/2018 2 8*    Total Bilirubin 04/12/2018 0 40     eGFR 04/12/2018 44     TSH 3RD GENERATON 04/12/2018 0 976     Magnesium 04/12/2018 2 0     Lipase 04/12/2018 65*    Color, UA 04/12/2018 Yellow     Clarity, UA 04/12/2018 Clear     Specific Gravity, UA 04/12/2018 1 010     pH, UA 04/12/2018 6 0     Leukocytes, UA 04/12/2018 Small*    Nitrite, UA 04/12/2018 Positive*    Protein, UA 04/12/2018 Negative     Glucose, UA 04/12/2018 Negative     Ketones, UA 04/12/2018 Negative     Urobilinogen, UA 04/12/2018 0 2     Bilirubin, UA 04/12/2018 Negative     Blood, UA 04/12/2018 Large*    Troponin I 04/12/2018 0 02     Protime 04/12/2018 16 7*    INR 04/12/2018 1 36*    PTT 04/12/2018 39*    Blood Culture 04/12/2018 No Growth at 24 hrs   Blood Culture 04/12/2018 No Growth at 24 hrs       SODIUM, I-STAT 04/12/2018 145     Potassium, i-STAT 04/12/2018 4 6     Chloride, istat 04/12/2018 111*    CO2, i-STAT 04/12/2018 22     Anion Gap, Istat 04/12/2018 17*    Calcium, Ionized i-STAT 04/12/2018 1 19     BUN, I-STAT 04/12/2018 49*    Creatinine, i-STAT 04/12/2018 1 2     eGFR 04/12/2018 42     Glucose, i-STAT 04/12/2018 141*    Hct, i-STAT 04/12/2018 <15*    Hgb, i-STAT 04/12/2018      Specimen Type 04/12/2018 VENOUS     ABO Grouping 04/12/2018 A     Rh Factor 04/12/2018 Positive     Antibody Screen 04/12/2018 Negative     Specimen Expiration Date 04/12/2018 [id number]     RBC, UA 04/12/2018 4-10*    WBC, UA 04/12/2018 2-4*    Epithelial Cells 04/12/2018 Occasional     Bacteria, UA 04/12/2018 Moderate*    Unit Product Code 04/13/2018 F0665H69     Unit Number 04/13/2018 [id number]     Unit ABO 04/13/2018 A     Unit [facility] 04/13/2018 POS     Crossmatch 04/13/2018 Compatible     Unit Dispense Status 04/13/2018 Presumed Trans     Unit Product Code 04/13/2018 I9819L35     Unit Number 04/13/2018 [id number]     Unit ABO 04/13/2018 A     Unit RH 04/13/2018 POS     Crossmatch 04/13/2018 Compatible     Unit Dispense Status 04/13/2018 Presumed Trans     Troponin I 04/12/2018 0 04     Troponin I 04/13/2018 0 08*    WBC 04/12/2018 12 27*    RBC 04/12/2018 2 26*    Hemoglobin 04/12/2018 4 8*    Hematocrit 04/12/2018 16 4*    MCV 04/12/2018 73*    MCH 04/12/2018 21 2*    MCHC 04/12/2018 29 3*    RDW 04/12/2018 26 1*    Platelets 56/63/2395 118*    Neutrophils Relative 04/12/2018 89*    Lymphocytes Relative 04/12/2018 8*    Monocytes Relative 04/12/2018 3*    Eosinophils Relative 04/12/2018 0     Basophils Relative 04/12/2018 0     Neutrophils Absolute 04/12/2018 10 82*    Lymphocytes Absolute 04/12/2018 0 99     Monocytes Absolute 04/12/2018 0 42     Eosinophils Absolute 04/12/2018 0 01     Basophils Absolute 04/12/2018 0 03     Iron Saturation 04/12/2018 16     TIBC 04/12/2018 416     Iron 04/12/2018 67     Ferritin 04/12/2018 6*    Hemoglobin 04/13/2018 7 3*    Hematocrit 04/13/2018 23 1*    Sodium 04/13/2018 148*    Potassium 04/13/2018 3 6     Chloride 04/13/2018 115*    CO2 04/13/2018 23     Anion Gap 04/13/2018 10     BUN 04/13/2018 43*    Creatinine 04/13/2018 1 04     Glucose 04/13/2018 103     Calcium 04/13/2018 8 0     AST 04/13/2018 14     ALT 04/13/2018 9*    Alkaline Phosphatase 04/13/2018 66     Total Protein 04/13/2018 5 4*    Albumin 04/13/2018 2 6*    Total Bilirubin 04/13/2018 0 60     eGFR 04/13/2018 51     Magnesium 04/13/2018 2 1     WBC 04/13/2018 7 67     RBC 04/13/2018 2 98*    Hemoglobin 04/13/2018 7 0*    Hematocrit 04/13/2018 22 0*    MCV 04/13/2018 74*    MCH 04/13/2018 23 5*    MCHC 04/13/2018 31 8     RDW 04/13/2018 21 8*    Platelets 94/65/8134 123*    Troponin I 04/13/2018 0 08*    Unit Product Code 04/13/2018 J2791E60     Unit Number 04/13/2018 [id number]     Unit ABO 04/13/2018 A     Unit [facility] 04/13/2018 POS     Crossmatch 04/13/2018 Compatible     Unit Dispense Status 04/13/2018 Presumed Trans     Unit Product Code 04/13/2018 [id number]     Unit Number 04/13/2018 [id number]     Unit ABO 04/13/2018 A     Unit RH 04/13/2018 POS     Crossmatch 04/13/2018 Compatible     Unit Dispense Status 04/13/2018 Presumed Trans     Ventricular Rate 04/12/2018 93     Atrial Rate 04/12/2018 93     [place] Interval 04/12/2018 124     QRSD Interval 04/12/2018 72     QT Interval 04/12/2018 332     QTC Interval 04/12/2018 412     P Axis 04/12/2018 68     QRS Axis 04/12/2018 75     T Wave Axis 04/12/2018 66        Imaging Studies: I have personally reviewed pertinent imaging studies  Cta Head And Neck With And Without Contrast    Result Date: 4/12/2018  Impression: No acute intracranial disease  Footprint of advanced chronic small vessel disease and remote left parietal cortical/subcortical infarct are stable  Multifocal atherosclerotic disease with minor narrowing of both vertebral origins, and calculated 38% short segment stenosis right ICA  Left common carotid is occluded shortly beyond the origin, with reconstitution at the bifurcation and likely through ECA collaterals  Severe stenosis of the left ICA  Mild to moderate Multifocal intracranial stenoses  Workstation performed: [id number]     Xr Shoulder 2+ Vw Left    Result Date: 4/12/2018  Impression: No acute osseous abnormality  Workstation performed: [id number]     Xr Shoulder 2+ Views Right    Result Date: 4/12/2018  Impression: Anterior dislocation of the right shoulder  Hill-Sachs deformity of the humeral head of indeterminate age  Small corticated osseous densities lateral to the glenoid could represent avulsion fractures of indeterminate age    These findings are new from prior x-ray however  Workstation performed: [id number]     Xr Humerus Right    Result Date: 4/13/2018  Impression: Fluoroscopic guidance provided for surgical procedure  Please refer to the separate procedure notes for additional details  Workstation performed: [id number]     Ct Shoulder Right Wo Contrast    Result Date: 4/12/2018  Impression: 1  Subcoracoid, anterior glenohumeral joint dislocation  2   Age-indeterminate Hill-Sachs deformity with several osseous fragments medial to the osseous glenoid  3   Small right pleural effusion  Workstation performed: [id number]     Ct Chest Abdomen Pelvis W Contrast    Result Date: 4/12/2018  Impression: Anterior dislocation right shoulder, age-indeterminate  Patient has a history of previous right shoulder dislocation per report  Old, multifocal osseous trauma as detailed above  Small bilateral water density pleural effusions  No acute traumatic findings seen in the abdomen or pelvis  Incidental findings as detailed above  Workstation performed: [id number]       ASSESSMENT/ PLAN:      1  Acute blood-loss anemia since she does have a heme-positive stool and possibly upper GI source since she has a high BUN  At this time she is not having any active bleeding her hemoglobin is stable after transfusion and has responded appropriately  I would recommend to continue with the Protonix for the time being  Follow hemoglobin levels  I discussed with the patient's son regarding doing endoscopic workup however he would like to hold off for the time being since she is not having any active bleeding  I do agree with that  However if there is any significant drop in the hemoglobin level then we could certainly plan to evaluate    He is in agreement with this plan

## 2018-04-15 ENCOUNTER — APPOINTMENT (INPATIENT)
Dept: RADIOLOGY | Facility: HOSPITAL | Age: 83
DRG: 812 | End: 2018-04-15
Payer: MEDICARE

## 2018-04-15 LAB
HBV SURFACE AG SER QL: NORMAL
HCV AB SER QL: NORMAL

## 2018-04-15 PROCEDURE — 97530 THERAPEUTIC ACTIVITIES: CPT

## 2018-04-15 PROCEDURE — 71045 X-RAY EXAM CHEST 1 VIEW: CPT

## 2018-04-15 PROCEDURE — C9113 INJ PANTOPRAZOLE SODIUM, VIA: HCPCS | Performed by: FAMILY MEDICINE

## 2018-04-15 PROCEDURE — G8978 MOBILITY CURRENT STATUS: HCPCS

## 2018-04-15 PROCEDURE — 99232 SBSQ HOSP IP/OBS MODERATE 35: CPT | Performed by: FAMILY MEDICINE

## 2018-04-15 PROCEDURE — G8979 MOBILITY GOAL STATUS: HCPCS

## 2018-04-15 PROCEDURE — 97162 PT EVAL MOD COMPLEX 30 MIN: CPT

## 2018-04-15 RX ORDER — HYDROCHLOROTHIAZIDE 12.5 MG/1
12.5 TABLET ORAL DAILY
Status: DISCONTINUED | OUTPATIENT
Start: 2018-04-15 | End: 2018-04-16 | Stop reason: HOSPADM

## 2018-04-15 RX ORDER — PANTOPRAZOLE SODIUM 40 MG/1
40 TABLET, DELAYED RELEASE ORAL
Status: DISCONTINUED | OUTPATIENT
Start: 2018-04-15 | End: 2018-04-16 | Stop reason: HOSPADM

## 2018-04-15 RX ORDER — LOSARTAN POTASSIUM 50 MG/1
100 TABLET ORAL DAILY
Status: DISCONTINUED | OUTPATIENT
Start: 2018-04-16 | End: 2018-04-16 | Stop reason: HOSPADM

## 2018-04-15 RX ORDER — FUROSEMIDE 10 MG/ML
40 INJECTION INTRAMUSCULAR; INTRAVENOUS ONCE
Status: COMPLETED | OUTPATIENT
Start: 2018-04-15 | End: 2018-04-15

## 2018-04-15 RX ADMIN — HYDROCHLOROTHIAZIDE 12.5 MG: 12.5 TABLET ORAL at 09:22

## 2018-04-15 RX ADMIN — METOPROLOL TARTRATE 25 MG: 25 TABLET ORAL at 20:27

## 2018-04-15 RX ADMIN — LOSARTAN POTASSIUM 50 MG: 50 TABLET ORAL at 06:23

## 2018-04-15 RX ADMIN — METOPROLOL TARTRATE 25 MG: 25 TABLET ORAL at 06:23

## 2018-04-15 RX ADMIN — PANTOPRAZOLE SODIUM 40 MG: 40 TABLET, DELAYED RELEASE ORAL at 16:50

## 2018-04-15 RX ADMIN — FUROSEMIDE 40 MG: 10 INJECTION, SOLUTION INTRAMUSCULAR; INTRAVENOUS at 10:01

## 2018-04-15 RX ADMIN — CEFTRIAXONE 1000 MG: 1 INJECTION, SOLUTION INTRAVENOUS at 16:50

## 2018-04-15 RX ADMIN — PANTOPRAZOLE SODIUM 40 MG: 40 INJECTION, POWDER, FOR SOLUTION INTRAVENOUS at 09:22

## 2018-04-15 RX ADMIN — OXYBUTYNIN CHLORIDE 5 MG: 5 TABLET ORAL at 09:22

## 2018-04-15 NOTE — ASSESSMENT & PLAN NOTE
Status post 4 units PRBCs  Hemoglobin stable  Family has opted for conservative approach and will defer invasive testing (transvaginal ultrasound, EGD, colonoscopy)

## 2018-04-15 NOTE — PROGRESS NOTES
Progress Note - Amber Jolly 1935, 82 y o  female MRN: [id number]    Unit/Bed#:  Encounter: [id number]    Primary Care Provider: Val Rebolledo DO   Date and time admitted to hospital: 4/12/2018 11:59 AM        * Acute blood loss anemia   Assessment & Plan    Status post 4 units PRBCs  Hemoglobin stable  Family has opted for conservative approach and will defer invasive testing (transvaginal ultrasound, EGD, colonoscopy)         GI bleed   Assessment & Plan    Advance diet  Appreciate Gastroenterology input  Protonix p o  b i d  Vaginal prolapse   Assessment & Plan    I have discussed this in the possibility of a transvaginal ultrasound and given current instability of her condition and advanced age the son would like to hold off for now  Outpatient GYN follow up          Will give 1 time dose of 40 mg Lasix  DC Ochoa next  Tentative discharge home tomorrow    Progress Note - Amber Jolly 82 y o  female MRN: [id number]    Unit/Bed#:  Encounter: [id number]      Subjective:   Patient seen and examined at bedside, she is back to baseline and wants to go home  She appears short of breath and tachypneic, a chest x-ray reviewed blunting of the right costophrenic angle and I suspect she has mild volume overload    Objective:     Vitals:   Vitals:    04/15/18 0815   BP: (!) 170/111   Pulse: 71   Resp: (!) 39   Temp: (!) 96 9 °F (36 1 °C)   SpO2:      Body mass index is 31 99 kg/m²      Intake/Output Summary (Last 24 hours) at 04/15/18 1007  Last data filed at 04/15/18 0600   Gross per 24 hour   Intake              545 ml   Output             1300 ml   Net             -755 ml       Physical Exam:   BP (!) 170/111 (BP Location: Left arm) Comment: MD aware BP readings med adjustments noted  Pulse 71   Temp (!) 96 9 °F (36 1 °C) (Temporal)   Resp (!) 39   Ht 4' 2" (1 27 m)   Wt 51 6 kg (113 lb 12 1 oz)   SpO2 98%   BMI 31 99 kg/m²   General appearance: alert and oriented, in no acute distress  Lungs:  Questionable bibasilar rales  Heart: regular rate and rhythm, S1, S2 normal, no murmur, click, rub or gallop  Abdomen: soft, non-tender; bowel sounds normal; no masses,  no organomegaly  Extremities: extremities normal, warm and well-perfused; no cyanosis, clubbing, or edema  Pulses: 2+ and symmetric  Neurologic: Grossly normal     Invasive Devices     Peripheral Intravenous Line            Peripheral IV 04/13/18 Left Hand 1 day          Drain            Urethral Catheter Latex;Straight-tip 16 Fr  2 days                  Results from last 7 days  Lab Units 04/14/18  0444 04/13/18  0459 04/13/18  0033 04/12/18  1935   WBC Thousand/uL 8 60 7 67  --  12 27*   HEMOGLOBIN g/dL 10 6* 7 0* 7 3* 4 8*   HEMATOCRIT % 31 8* 22 0* 23 1* 16 4*   PLATELETS Thousands/uL 106* 123*  --  118*         Results from last 7 days  Lab Units 04/14/18 0444 04/13/18  0459 04/12/18  1422   SODIUM mmol/L 147* 148* 145   POTASSIUM mmol/L 4 0 3 6 4 6   CHLORIDE mmol/L 115* 115* 112*   CO2 mmol/L 24 23 23   BUN mg/dL 30* 43* 49*   CREATININE mg/dL 1 08 1 04 1 17   CALCIUM mg/dL 7 9 8 0 8 1   TOTAL PROTEIN g/dL  --  5 4* 5 9*   BILIRUBIN TOTAL mg/dL  --  0 60 0 40   ALK PHOS U/L  --  66 72   ALT U/L  --  9* 14   AST U/L  --  14 12   GLUCOSE RANDOM mg/dL 124 103 139       Medication Administration - last 24 hours from 04/14/2018 1007 to 04/15/2018 1007       Date/Time Order Dose Route Action Action by     04/15/2018 0922 oxybutynin (DITROPAN) tablet 5 mg 5 mg Oral Given Kalia Garcia RN     04/14/2018 1755 cefTRIAXone (ROCEPHIN) IVPB (premix) 1,000 mg 0 mg Intravenous Stopped Kalia Garcia RN     04/14/2018 1725 cefTRIAXone (ROCEPHIN) IVPB (premix) 1,000 mg 1,000 mg Intravenous [address] Kalia Garcia RN     04/15/2018 0922 pantoprazole (PROTONIX) injection 40 mg 40 mg Intravenous Given Kalia Garcia RN     04/14/2018 2043 pantoprazole (PROTONIX) injection 40 mg 40 mg Intravenous Given Nguyen Hernandez RN 04/14/2018 1100 sodium chloride 0 9 % infusion 0 mL/hr Intravenous Stopped Tia Garcia RN     04/15/2018 0623 losartan (COZAAR) tablet 50 mg 50 mg Oral Given Andra Benito RN     04/15/2018 2933 metoprolol tartrate (LOPRESSOR) tablet 25 mg 25 mg Oral Given Andra Benito RN     04/14/2018 2042 metoprolol tartrate (LOPRESSOR) tablet 25 mg 25 mg Oral Given Andra Benito RN     04/14/2018 2340 hydrALAZINE (APRESOLINE) injection 10 mg 10 mg Intravenous Given Andra Benito RN     04/15/2018 4590 hydrochlorothiazide (HYDRODIURIL) tablet 12 5 mg 12 5 mg Oral Given Tia Garcia RN     04/15/2018 1001 furosemide (LASIX) injection 40 mg 40 mg Intravenous Given Tia Garcia RN            Lab, Imaging and other studies: I have personally reviewed pertinent reports      VTE Pharmacologic Prophylaxis:  None secondary to anemia  VTE Mechanical Prophylaxis: sequential compression device     Juliette Oquendo MD  4/15/2018,10:07 AM

## 2018-04-15 NOTE — ASSESSMENT & PLAN NOTE
I have discussed this in the possibility of a transvaginal ultrasound and given current instability of her condition and advanced age the son would like to hold off for now      Outpatient GYN follow up

## 2018-04-15 NOTE — SOCIAL WORK
I spoke with the patients son and the patient lives with him in a one story home with 2 steps and a hand rail to enter the home  The patient was ambulating with a walker at home and she does feed and toilet herself  The son assists her with a shower once a week  She will need rehab on d/c and I made a referral to the fifth floor  The patient is agreeable

## 2018-04-15 NOTE — PHYSICAL THERAPY NOTE
PT EVAL     04/15/18 1223   Note Type   Note type Eval/Treat   Pain Assessment   Pain Assessment Unable to assess  (Pt unable to understand pain scale)   Pain Score (Pain R UE with movement, appears moderate based on facial ex)   Pain Type Surgical pain   Pain Location Arm; Shoulder   Pain Orientation Right   Home Living   Type of [address] One level   [place]; Hospital bed   Additional Comments son assists with bathing   Prior Function   Level of [place] Needs assistance with IADLs; Needs assistance with ADLs and functional mobility   Lives With Son   Receives Help From Family   ADL Assistance Needs assistance   IADLs Needs assistance   Falls in the last 6 months 1 to 4   Comments 2 JOHN with rail   Restrictions/Precautions   Weight Bearing Precautions Per Order Yes   RUE Weight Bearing Per Order NWB  (Sling R UE)   Other Precautions Chair Alarm; Bed Alarm;Agitated; Fall Risk;Pain;Telemetry;WBS   Cognition   Arousal/Participation Alert   Orientation Level Oriented to person   Following Commands Follows one step commands with increased time or repetition   RUE Assessment   RUE Assessment (Sling R UE)   LUE Assessment   LUE Assessment (See OT eval)   RLE Assessment   RLE Assessment WFL   Strength RLE   RLE Overall Strength 4/5   LLE Assessment   LLE Assessment WFL   Strength LLE   LLE Overall Strength 4/5   Coordination   Movements are Fluid and Coordinated 1   Bed Mobility   Supine to Sit 4  Minimal assistance   Transfers   Sit to Stand 4  Minimal assistance   Additional items Assist x 1;Verbal cues; Increased time required   Stand to Sit 4  Minimal assistance   Additional items Assist x 1;Verbal cues; Increased time required   Ambulation/Elevation   Gait pattern (Decreased step length, B genu varum)   Gait Assistance 4  Minimal assist   Additional items Assist x 1; Tactile cues; Verbal cues   Assistive Device (handheld assist with L )   Distance (3')   Balance Static Sitting Fair   Dynamic Sitting Fair   Static Standing Fair -   Dynamic Standing Fair -   Ambulatory Fair -   Activity Tolerance   Activity Tolerance Patient limited by fatigue   Assessment   Prognosis Good   Problem List Decreased strength;Decreased endurance; Impaired balance;Decreased mobility; Decreased safety awareness;Pain   Assessment Pt is an 83 yo female admitted on 4/12/18 due to fall, weakness, hemoglobin 3 8  Pt with hx of rectal vs vaginal prolapse  Pt also with R shoulder dislocation  Pt underwent a closed reduction of the dislocation on 4/13/18  Pt now has a sling on the R UE  Pt required frequent cues to avoid using the R UE  Pt agreeable to OOB  Pt was given handheld assist to walk 3' to chair  Pt is unsteady  Usually uses a RW for gait  Due to cognitive deficits and balance deficits, pt may be unable to use a SPC, quad cane, or HW at this time  Pt will benefit from continued PT to assess gait with handheld assist vs unilateral device, progress transfers, bed mobility, strengthening, steps, and safety in order to maximize function and decrease risk for falls  Goals   Patient Goals (To go home)   LTG Expiration Date 04/29/18   Long Term Goal #1 Pt will be supervision with transfers - sit <-> stand, and bed <-> chair; supervision with bed mobility  Long Term Goal #2 Gait - 25-30' with handheld assist vs with device with min assist    Plan   Treatment/Interventions Functional transfer training;LE strengthening/ROM; Therapeutic exercise; Endurance training;Patient/family training;Bed mobility;Gait training;Equipment eval/education   PT Frequency (3-5 days per week, 1-2 times per day)   Recommendation   Recommendation Short-term skilled PT   PT - OK to Discharge No   Pt OOB in chair  Call bell in reach  Legs elevated  Chair alarm in place, SCDs donned and functioning

## 2018-04-16 ENCOUNTER — HOSPITAL ENCOUNTER (INPATIENT)
Facility: HOSPITAL | Age: 83
LOS: 17 days | Discharge: HOME HEALTH | DRG: 092 | End: 2018-05-03
Attending: INTERNAL MEDICINE | Admitting: INTERNAL MEDICINE
Payer: MEDICARE

## 2018-04-16 VITALS
WEIGHT: 112.88 LBS | TEMPERATURE: 99.5 F | RESPIRATION RATE: 18 BRPM | HEIGHT: 55 IN | HEART RATE: 80 BPM | DIASTOLIC BLOOD PRESSURE: 83 MMHG | BODY MASS INDEX: 26.12 KG/M2 | SYSTOLIC BLOOD PRESSURE: 181 MMHG | OXYGEN SATURATION: 95 %

## 2018-04-16 DIAGNOSIS — D62 ACUTE BLOOD LOSS ANEMIA: ICD-10-CM

## 2018-04-16 DIAGNOSIS — N17.9 AKI (ACUTE KIDNEY INJURY) (HCC): ICD-10-CM

## 2018-04-16 DIAGNOSIS — D51.8 OTHER VITAMIN B12 DEFICIENCY ANEMIAS: ICD-10-CM

## 2018-04-16 DIAGNOSIS — Z00.00 ROUTINE ADULT HEALTH MAINTENANCE: ICD-10-CM

## 2018-04-16 DIAGNOSIS — D64.9 ANEMIA, UNSPECIFIED TYPE: Primary | ICD-10-CM

## 2018-04-16 PROCEDURE — G8988 SELF CARE GOAL STATUS: HCPCS

## 2018-04-16 PROCEDURE — 97116 GAIT TRAINING THERAPY: CPT

## 2018-04-16 PROCEDURE — G8987 SELF CARE CURRENT STATUS: HCPCS

## 2018-04-16 PROCEDURE — 97162 PT EVAL MOD COMPLEX 30 MIN: CPT

## 2018-04-16 PROCEDURE — 99239 HOSP IP/OBS DSCHRG MGMT >30: CPT | Performed by: FAMILY MEDICINE

## 2018-04-16 PROCEDURE — 97530 THERAPEUTIC ACTIVITIES: CPT

## 2018-04-16 PROCEDURE — 97167 OT EVAL HIGH COMPLEX 60 MIN: CPT

## 2018-04-16 RX ORDER — METOPROLOL TARTRATE 50 MG/1
50 TABLET, FILM COATED ORAL EVERY 12 HOURS SCHEDULED
Refills: 0
Start: 2018-04-16 | End: 2019-01-01 | Stop reason: HOSPADM

## 2018-04-16 RX ORDER — ACETAMINOPHEN 325 MG/1
650 TABLET ORAL EVERY 6 HOURS PRN
Status: DISCONTINUED | OUTPATIENT
Start: 2018-04-16 | End: 2018-04-16 | Stop reason: HOSPADM

## 2018-04-16 RX ORDER — METOPROLOL TARTRATE 50 MG/1
50 TABLET, FILM COATED ORAL EVERY 12 HOURS SCHEDULED
Status: DISCONTINUED | OUTPATIENT
Start: 2018-04-16 | End: 2018-04-16 | Stop reason: HOSPADM

## 2018-04-16 RX ORDER — PANTOPRAZOLE SODIUM 40 MG/1
40 TABLET, DELAYED RELEASE ORAL
Refills: 0
Start: 2018-04-16 | End: 2019-01-01 | Stop reason: HOSPADM

## 2018-04-16 RX ADMIN — ACETAMINOPHEN 650 MG: 325 TABLET, FILM COATED ORAL at 12:16

## 2018-04-16 RX ADMIN — OXYBUTYNIN CHLORIDE 5 MG: 5 TABLET ORAL at 10:18

## 2018-04-16 RX ADMIN — LOSARTAN POTASSIUM 100 MG: 50 TABLET ORAL at 10:17

## 2018-04-16 RX ADMIN — METOPROLOL TARTRATE 50 MG: 50 TABLET ORAL at 10:14

## 2018-04-16 RX ADMIN — PANTOPRAZOLE SODIUM 40 MG: 40 TABLET, DELAYED RELEASE ORAL at 06:34

## 2018-04-16 RX ADMIN — HYDROCHLOROTHIAZIDE 12.5 MG: 12.5 TABLET ORAL at 10:18

## 2018-04-16 NOTE — PROGRESS NOTES
Report called to [address] [facility] on SN[facility] 5th floor, Eliane PCA took patient via wc accompanied by son Deborah Mapato to room 527  Patient packet taken with patient

## 2018-04-16 NOTE — ASSESSMENT & PLAN NOTE
Status post 4 units PRBCs  Patient refusing blood work x 2 days, unable to assess hemoglobin stability but she clinically appears stable  Family has opted for conservative approach and will defer invasive testing (transvaginal ultrasound, EGD, colonoscopy)

## 2018-04-16 NOTE — PROGRESS NOTES
Progress Note - Karin Gu 1935, 82 y o  female MRN: [id number]    Unit/Bed#:  Encounter: [id number]    Primary Care Provider: Gilford Purpura, DO   Date and time admitted to hospital: 4/12/2018 11:59 AM        * Acute blood loss anemia   Assessment & Plan    Status post 4 units PRBCs  Patient refusing blood work x 2 days, unable to assess hemoglobin stability but she clinically appears stable  Family has opted for conservative approach and will defer invasive testing (transvaginal ultrasound, EGD, colonoscopy)         GI bleed   Assessment & Plan    Advance diet  Appreciate Gastroenterology input  Protonix p o  B i d  Outpatient G  I follow up (son may reconsider procedure)        Vaginal prolapse   Assessment & Plan    I have discussed this in the possibility of a transvaginal ultrasound and given current instability of her condition and advanced age the son is interested in outpatient GYN follow up         Dislocation of right shoulder joint   Assessment & Plan    Patient was taken to the operating room however the shoulder is easily subluxed  Case was discussed with the patient's family and Orthopedics  The have opted for conservative management and will follow up as an outpatient with Dr Amador  Agreeable to STR   Continue sling         Elevated troponin   Assessment & Plan    Likely demand ischemia in the setting of profound anemia  Follow-up 2D cardiogram  Continue statin  BB resumed   Hold off on aspirin the setting of anemia        Essential hypertension   Assessment & Plan    Oral antihypertensive regimen has been resumed             Progress Note - Karin Gu 82 y o  female MRN: [id number]    Unit/Bed#:  Encounter: [id number]      Subjective:   Seen and examined @ bedside  She has no complaints  Son interested in outpatient G I and GYN follow up    She will be discharged to rehab     Objective:     Vitals:   Vitals:    04/16/18 0708   BP:    Pulse: 75   Resp: 18   Temp: 99 5 °F (37 5 °C)   SpO2: 95%     Body mass index is 31 74 kg/m²      Intake/Output Summary (Last 24 hours) at 04/16/18 0831  Last data filed at 04/16/18 0617   Gross per 24 hour   Intake              170 ml   Output             3300 ml   Net            -3130 ml       Physical Exam:   /72 (BP Location: Left arm)   Pulse 75   Temp 99 5 °F (37 5 °C) (Temporal)   Resp 18   Ht 4' 2" (1 27 m)   Wt 51 2 kg (112 lb 14 oz)   SpO2 95%   BMI 31 74 kg/m²   General appearance: alert and oriented, in no acute distress  Head: Normocephalic, without obvious abnormality, atraumatic  Lungs: clear to auscultation bilaterally  Heart: regular rate and rhythm, S1, S2 normal, no murmur, click, rub or gallop  Abdomen: soft, non-tender; bowel sounds normal; no masses,  no organomegaly  Extremities: extremities normal, warm and well-perfused; no cyanosis, clubbing, or edema  Neurologic: Grossly normal     Invasive Devices     Peripheral Intravenous Line            Peripheral IV 04/13/18 Left Hand 2 days                  Results from last 7 days  Lab Units 04/14/18  0444 04/13/18  0459 04/13/18  0033 04/12/18  1935   WBC Thousand/uL 8 60 7 67  --  12 27*   HEMOGLOBIN g/dL 10 6* 7 0* 7 3* 4 8*   HEMATOCRIT % 31 8* 22 0* 23 1* 16 4*   PLATELETS Thousands/uL 106* 123*  --  118*         Results from last 7 days  Lab Units 04/14/18  0444 04/13/18  0459 04/12/18  1422   SODIUM mmol/L 147* 148* 145   POTASSIUM mmol/L 4 0 3 6 4 6   CHLORIDE mmol/L 115* 115* 112*   CO2 mmol/L 24 23 23   BUN mg/dL 30* 43* 49*   CREATININE mg/dL 1 08 1 04 1 17   CALCIUM mg/dL 7 9 8 0 8 1   TOTAL PROTEIN g/dL  --  5 4* 5 9*   BILIRUBIN TOTAL mg/dL  --  0 60 0 40   ALK PHOS U/L  --  66 72   ALT U/L  --  9* 14   AST U/L  --  14 12   GLUCOSE RANDOM mg/dL 124 103 139       Medication Administration - last 24 hours from 04/15/2018 0831 to 04/16/2018 0831       Date/Time Order Dose Route Action Action by     04/15/2018 0922 oxybutynin (DITROPAN) tablet 5 mg 5 mg Oral Given Annie Delta, KEKE     04/15/2018 1720 cefTRIAXone (ROCEPHIN) IVPB (premix) 1,000 mg 0 mg Intravenous Stopped Annie Sorenson, KEKE     04/15/2018 1650 cefTRIAXone (ROCEPHIN) IVPB (premix) 1,000 mg 1,000 mg Intravenous [address] Annie Sorenson, KEKE     04/15/2018 1601 pantoprazole (PROTONIX) injection 40 mg 40 mg Intravenous Given Annie Sorenson RN     04/15/2018 2027 metoprolol tartrate (LOPRESSOR) tablet 25 mg 25 mg Oral Given Nasim Nitin, [facility]     04/15/2018 2177 hydrochlorothiazide (HYDRODIURIL) tablet 12 5 mg 12 5 mg Oral Given Annie Sorenson RN     04/15/2018 1001 furosemide (LASIX) injection 40 mg 40 mg Intravenous Given Annie Sorenson RN     04/16/2018 0634 pantoprazole (PROTONIX) EC tablet 40 mg 40 mg Oral Given Nasim Taveras RN     04/15/2018 1650 pantoprazole (PROTONIX) EC tablet 40 mg 40 mg Oral Given Annie Sorenson RN            Lab, Imaging and other studies: I have personally reviewed pertinent reports      VTE Pharmacologic Prophylaxis: none 2/2 anemia  VTE Mechanical Prophylaxis: sequential compression device     Galileo Matute MD  4/16/2018,8:31 AM

## 2018-04-16 NOTE — ASSESSMENT & PLAN NOTE
Likely demand ischemia in the setting of profound anemia  Follow-up 2D cardiogram  Continue statin  BB resumed   Hold off on aspirin the setting of anemia

## 2018-04-16 NOTE — OCCUPATIONAL THERAPY NOTE
Occupational Therapy Evaluation     Patient Name: Wagner Arellano  YMZIC'F Date: 4/16/2018  Problem List  Patient Active Problem List   Diagnosis    Dislocation of right shoulder joint    Essential hypertension    GI bleed    Hyperlipidemia    Anemia    Acute blood loss anemia    Vaginal prolapse    Elevated troponin     Past Medical History  Past Medical History:   Diagnosis Date    Acute respiratory failure with hypoxia and hypercapnia (HCC)     Arthritis     Asthma     Hypertension     Stroke ([address] )     residual RLE weakness     Past Surgical History  Past Surgical History:   Procedure Laterality Date    CLOSED REDUCTION HUMERUS FRACTURE Right 11/29/2016    Procedure: CLOSED REDUCTION ARM/HUMERUS  CLOSED VS  OPEN SHOULDER REDUCTION;  Surgeon: Toyin Kulkarni MD;  Location: [facility];  Service:     EYE SURGERY      bilateral CAT EXT W/IOLs    RECTAL PROLAPSE REPAIR      done 5-6 yrs ago             04/16/18 0941   Note Type   Note type Eval/Treat   Restrictions/Precautions   Weight Bearing Precautions Per Order Yes   RUE Weight Bearing Per Order NWB   Braces or Orthoses Sling   Other Precautions Chair Alarm; Bed Alarm;Multiple lines;Telemetry; Fall Risk   Pain Assessment   Pain Assessment No/denies pain   Home Living   Additional Comments see PT evaluation for details    Prior Function   Level of [place] Needs assistance with ADLs and functional mobility; Needs assistance with IADLs   Lives With Son   Receives Help From Family   ADL Assistance Needs assistance   IADLs Needs assistance   Falls in the last 6 months 1 to 4   Psychosocial   Psychosocial (WDL) WDL   Subjective   Subjective I should just quit    ADL   Where Assessed Edge of bed   UB Dressing Assistance 3  Moderate Assistance   UB Dressing Deficit (sling adjustment and snaps)   Additional Comments (A) for sling placement and adjusting sling to comfort; pt not able to maintain NWB status of R UE   Bed Mobility   Supine to Sit 4  Minimal assistance   Additional items Assist x 1;Bedrails;Verbal cues   Sit to Supine (seated in chair at end of session)   Transfers   Sit to Stand 4  Minimal assistance   Additional items Assist x 1;Verbal cues  (handheld (A) )   Stand to Sit 4  Minimal assistance   Additional items Assist x 1;Verbal cues  (handheld (A))   Additional Comments pt unable to maintain NWB status of R UE during session; pt utilized R UE and held onto furniture and therapist during FM    Functional Mobility   Functional Mobility 4  Minimal assistance   Additional Comments x1-2 for safety; pt performed FM to chair from bed at handheld (A) x2 for safety    Additional items Hand hold assistance   Balance   Static Sitting Fair +   Dynamic Sitting Fair +   Static Standing Fair   Dynamic Standing Fair -   Ambulatory Fair -   Activity Tolerance   Activity Tolerance Patient limited by fatigue   RUE Assessment   RUE Assessment X  (sling in place)   LUE Assessment   LUE Assessment WFL   Hand Function   Gross Motor Coordination Impaired  (R UE)   Fine Motor Coordination (R UE)   Sensation   Light Touch No apparent deficits   Sharp/Dull No apparent deficits   Cognition   Overall Cognitive Status Impaired   Arousal/Participation Alert   Attention Attends with cues to redirect   Orientation Level Oriented to person   Memory Decreased long term memory;Decreased short term memory   Following Commands Follows one step commands with increased time or repetition   Assessment   Limitation Decreased ADL status; Decreased UE strength;Decreased Safe judgement during ADL;Decreased endurance;Decreased cognition;Decreased self-care trans;Decreased high-level ADLs   Assessment pt would benefit from continued OT intervention, however, pt is to be d/c'ed from this facility on this date   Recommendation   OT Discharge Recommendation 24 hour supervision/assist   OT - OK to Discharge No   Barthel Index   Feeding 5   Bathing 0   Grooming Score 0   Dressing Score 5   Bladder Score 5   Bowels Score 5   Toilet Use Score 5   Transfers (Bed/Chair) Score 10   Mobility (Level Surface) Score 10   Stairs Score 0   Barthel Index Score 45     Pt left seated in chair at end of session; all needs within reach; all lines intact; scds connected and turned on  Pt will benefit from continued OT services in order to maximize (I) c ADL performance, FM c RW, and improve overall endurance/strength required to complete functional tasks in preparation for d/c

## 2018-04-16 NOTE — PROGRESS NOTES
8050 - patient helped from chair to bsc by aid   5358 - patient had fall from bsc to floor, patient stated she was reaching to pick something up from floor and slid off of bsc, this rn was notified by aid    9805 - this rn and aid picked patient up from floor and put her back to bed, physical assessment done at this time and no injuries were noted  [address] [facility] [address] Lizandro Schwartz notified, [facility] was also notified  Bed alarm placed on bed

## 2018-04-16 NOTE — NURSING NOTE
Patient taken to SNF 5th floor via wc, report called to [address]  Patient packet taken with patient  Accompanied by son Geovany Lee and Kade CHASE

## 2018-04-16 NOTE — ASSESSMENT & PLAN NOTE
I have discussed this in the possibility of a transvaginal ultrasound and given current instability of her condition and advanced age the son is interested in outpatient GYN follow up

## 2018-04-16 NOTE — PROGRESS NOTES
Patient refusing AM labwork  Educated on importance, however she states it is too painful and she has been stuck too many times  Isis Manzo PA-C notified

## 2018-04-16 NOTE — ASSESSMENT & PLAN NOTE
Patient was taken to the operating room however the shoulder is easily subluxed  Case was discussed with the patient's family and Orthopedics  The have opted for conservative management and will follow up as an outpatient with Dr Amador  Agreeable to STR   Continue sling

## 2018-04-16 NOTE — SOCIAL WORK
The patient was d/c to the fifth floor and she was agreeable and her son Solitario Valdez is aware and he did the paperwork with the admissions at the fifth floor  I spoke with the son and he is agreeable to this and he signed the imm and the son is aware of the right to appeal the d/c

## 2018-04-16 NOTE — DISCHARGE SUMMARY
Discharge Summary - Mai Yun 82 y o  female MRN: [id number]    Unit/Bed#:  Encounter: [id number]    Admission Date:   Admission Orders     Ordered        04/12/18 1502  Inpatient Admission  Once               Admitting Diagnosis: Weakness [R53 1]  Anemia [D64 9]  GI bleed [K92 2]  Anterior shoulder dislocation [S43 016A]  Frequent falls [R29 6]  Dislocation of right shoulder joint, sequela [S43 004S]    HPI: The patient is an 82year old female from home brought in accompanied by her son after having a having a fall  The patient's son states that over the past 2 days that patient has been very weak  She had a ground level fall witnessed by her son  He states he thinks she slid today but yesterday she fell with her walker  He states that he has noticed "something fall out of her", which he thought was big hemorrhoids while standing her up in the shower when he bathed her  He states that this would sometimes bleed but not profusely  Maybe 1 tablespoon  He also states that he was able to push this back without any complication  The patient was noted by nursing to have vaginal prolapse in the ED that was reduced during augustin insertion, this would make more sense as I did not palpate masses on rectal exam  I did not complete a bimanual exam  He denies ever noting bright red blood per rectum when he wipes her, he does not believe she is having melena  There has been no mirella hematemesis  His mother ambulates around the house, her appetite over the past few weeks has declined  Procedures Performed:   Orders Placed This Encounter   Procedures    ED ECG Documentation Only       Hospital Course:   Acute Blood loss anemia :  Patient received 4 units PRBCs, likely source is upper GI bleed, started on Protonix drip and transitioned to b i d  Protonix and ultimately b i d  p o  Protonix  family opted for conservative route and decline EGD and colonoscopy    She also has what sounds like vaginal prolapse although I did not appreciate this on exam   They opted against transvaginal ultrasound and elected to follow up as an outpatient with gyn  Patient had an anterior shoulder dislocation and was taken to the operating room however it remained subluxed and the patient's family again opted for conservative management and will follow up with Orthopedics as an outpatient  Patient was accepted at [facility] nursing facility for short-term rehabilitation will be discharged today    Significant Findings, Care, Treatment and Services Provided:     Complications:     Discharge Diagnosis:   Acute blood loss anemia secondary to upper GI bleed  Elevated troponin    Resolved Problems  Date Reviewed: 12/1/2016    None          Condition at Discharge: fair     Discharge instructions/Information to patient and family:   See after visit summary for information provided to patient and family  Provisions for Follow-Up Care:  See after visit summary for information related to follow-up care and any pertinent home health orders  Disposition:  [facility]    Planned Readmission: No    Discharge Statement   I spent 60 minutes discharging the patient  This time was spent on the day of discharge  I had direct contact with the patient on the day of discharge  Additional documentation is required if more than 30 minutes were spent on discharge  Discharge Medications:  See after visit summary for reconciled discharge medications provided to patient and family

## 2018-04-16 NOTE — ASSESSMENT & PLAN NOTE
Advance diet  Appreciate Gastroenterology input  Protonix p o  B i d  Outpatient G  I follow up (son may reconsider procedure)

## 2018-04-16 NOTE — PHYSICAL THERAPY NOTE
PT treatment Note      04/16/18 4500   Restrictions/Precautions   Other Precautions (Chair Alarm; Bed Alarm;Multiple lines;Telemetry; Fall Risk)   Bed Mobility   Supine to Sit 4  Minimal assistance   Additional items Assist x 1;Bedrails;Verbal cues   Transfers   Sit to Stand 4  Minimal assistance   Additional items Assist x 1;Verbal cues   Stand to Sit 4  Minimal assistance   Additional items Assist x 1; Armrests; Verbal cues   Stand pivot 4  Minimal assistance   Additional Comments pt unable to maintain NWB status of R UE during session; pt utilized R UE and held onto furniture and therapist    Ambulation/Elevation   Gait pattern Short stride   Gait Assistance 4  Minimal assist   Additional items Assist x 1;Verbal cues   Assistive Device None   Distance 20'   Balance   Static Sitting Fair +   Dynamic Sitting Fair +   Static Standing Fair   Dynamic Standing Fair   Ambulatory Fair   Activity Tolerance   Activity Tolerance Patient limited by fatigue   Assessment   Prognosis Good   Problem List Decreased strength;Decreased endurance; Impaired balance;Decreased mobility; Decreased safety awareness;Pain   Assessment Requires min assist for mobility  Cues for safety  Pt will benefit from continued PT to assess gait with handheld assist vs unilateral device, progress transfers, bed mobility, strengthening, steps, and safety in order to maximize function and decrease risk for falls  Plan   Treatment/Interventions Functional transfer training;LE strengthening/ROM; Therapeutic exercise; Endurance training;Bed mobility;Gait training   Progress Progressing toward goals   Recommendation   Recommendation Short-term skilled PT   Pt  OOB with call bell within reach, scd's connected and turned on and alarm on at end of PT session

## 2018-04-16 NOTE — PLAN OF CARE
Problem: Potential for Falls  Goal: Patient will remain free of falls  INTERVENTIONS:  - Assess patient frequently for physical needs  -  Identify cognitive and physical deficits and behaviors that affect risk of falls    -  [place] fall precautions as indicated by assessment   - Educate patient/family on patient safety including physical limitations  - Instruct patient to call for assistance with activity based on assessment  - Modify environment to reduce risk of injury  - Consider OT/PT consult to assist with strengthening/mobility   Outcome: Progressing      Problem: Prexisting or High Potential for Compromised Skin Integrity  Goal: Skin integrity is maintained or improved  INTERVENTIONS:  - Identify patients at risk for skin breakdown  - Assess and monitor skin integrity  - Assess and monitor nutrition and hydration status  - Monitor labs (i e  albumin)  - Assess for incontinence   - Turn and reposition patient  - Assist with mobility/ambulation  - Relieve pressure over bony prominences  - Avoid friction and shearing  - Provide appropriate hygiene as needed including keeping skin clean and dry  - Evaluate need for skin moisturizer/barrier cream  - Collaborate with interdisciplinary team (i e  Nutrition, Rehabilitation, etc )   - Patient/family teaching    Outcome: Progressing      Problem: PAIN - ADULT  Goal: Verbalizes/displays adequate comfort level or baseline comfort level  Interventions:  - Encourage patient to monitor pain and request assistance  - Assess pain using appropriate pain scale  - Administer analgesics based on type and severity of pain and evaluate response  - Implement non-pharmacological measures as appropriate and evaluate response  - Consider cultural and social influences on pain and pain management  - Notify physician/advanced practitioner if interventions unsuccessful or patient reports new pain   Outcome: Progressing      Problem: INFECTION - ADULT  Goal: Absence or prevention of progression during hospitalization  INTERVENTIONS:  - Assess and monitor for signs and symptoms of infection  - Monitor lab/diagnostic results  - Monitor all insertion sites, i e  indwelling lines, tubes, and drains  - Monitor nasal secretions for changes in amount and color  - [place] appropriate cooling/warming therapies per order  - Administer medications as ordered  - Instruct and encourage patient and family to use good hand hygiene technique   Outcome: Progressing      Problem: SAFETY ADULT  Goal: Maintain or return to baseline ADL function  INTERVENTIONS:  -  Assess patient's ability to carry out ADLs; assess patient's baseline for ADL function and identify physical deficits which impact ability to perform ADLs (bathing, care of mouth/teeth, toileting, grooming, dressing, etc )  - Assess/evaluate cause of self-care deficits   - Assess range of motion  - Assess patient's mobility; develop plan if impaired  - Assess patient's need for assistive devices and provide as appropriate  - Encourage maximum independence but intervene and supervise when necessary  ¯ Involve family in performance of ADLs  ¯ Assess for home care needs following discharge   ¯ Request OT consult to assist with ADL evaluation and planning for discharge  ¯ Provide patient education as appropriate   Outcome: Progressing    Goal: Maintain or return mobility status to optimal level  INTERVENTIONS:  - Assess patient's baseline mobility status (ambulation, transfers, stairs, etc )    - Identify cognitive and physical deficits and behaviors that affect mobility  - Identify mobility aids required to assist with transfers and/or ambulation (gait belt, sit-to-stand, lift, walker, cane, etc )  - [place] fall precautions as indicated by assessment  - Record patient progress and toleration of activity level on Mobility SBAR; progress patient to next Phase/Stage  - Instruct patient to call for assistance with activity based on assessment  - Request Rehabilitation consult to assist with strengthening/weightbearing, etc    Outcome: Progressing      Problem: DISCHARGE PLANNING  Goal: Discharge to home or other facility with appropriate resources  INTERVENTIONS:  - Identify barriers to discharge w/patient and caregiver  - Arrange for needed discharge resources and transportation as appropriate  - Identify discharge learning needs (meds, wound care, etc )  - Refer to Case Management Department for coordinating discharge planning if the patient needs post-hospital services based on physician/advanced practitioner order or complex needs related to functional status, cognitive ability, or social support system   Outcome: Progressing      Problem: Knowledge Deficit  Goal: Patient/family/caregiver demonstrates understanding of disease process, treatment plan, medications, and discharge instructions  Complete learning assessment and assess knowledge base  Interventions:  - Provide teaching at level of understanding  - Provide teaching via preferred learning methods   Outcome: Progressing      Problem: CARDIOVASCULAR - ADULT  Goal: Maintains optimal cardiac output and hemodynamic stability  INTERVENTIONS:  - Monitor I/O, vital signs and rhythm  - Monitor for S/S and trends of decreased cardiac output i e  bleeding, hypotension  - Assess quality of pulses, skin color and temperature  - Assess for signs of decreased coronary artery perfusion - ex   Angina  - Instruct patient to report change in severity of symptoms   Outcome: Progressing      Problem: GASTROINTESTINAL - ADULT  Goal: Maintains or returns to baseline bowel function  INTERVENTIONS:  - Assess bowel function  - Encourage oral fluids to ensure adequate hydration  - Administer IV fluids as ordered to ensure adequate hydration  - Administer ordered medications as needed  - Encourage mobilization and activity  - Nutrition services referral to assist patient with appropriate food choices   Outcome: Progressing    Goal: Maintains adequate nutritional intake  INTERVENTIONS:  - Monitor percentage of each meal consumed  - Identify factors contributing to decreased intake, treat as appropriate  - Assist with meals as needed  - Monitor I&O, WT and lab values  - Obtain nutrition services referral as needed   Outcome: Progressing      Problem: GENITOURINARY - ADULT  Goal: Urinary catheter remains patent  INTERVENTIONS:  - Assess patency of urinary catheter  - Follow guidelines for intermittent irrigation of non-functioning urinary catheter   Outcome: Progressing      Problem: METABOLIC, FLUID AND ELECTROLYTES - ADULT  Goal: Fluid balance maintained  INTERVENTIONS:  - Monitor labs and assess for signs and symptoms of volume excess or deficit  - Monitor I/O and WT  - Instruct patient on fluid and nutrition as appropriate   Outcome: Progressing      Problem: SKIN/TISSUE INTEGRITY - ADULT  Goal: Incision(s), wounds(s) or drain site(s) healing without S/S of infection  INTERVENTIONS  - Assess and document risk factors for skin impairment   - Assess and document dressing, incision, wound bed, drain sites and surrounding tissue  - Initiate Nutrition services consult and/or wound management as needed   Outcome: Progressing    Goal: Oral mucous membranes remain intact  INTERVENTIONS  - Assess oral mucosa and hygiene practices  - Implement preventative oral hygiene regimen  - Initiate Nutrition services referral as needed    Outcome: Progressing      Problem: HEMATOLOGIC - ADULT  Goal: Maintains hematologic stability  INTERVENTIONS  - Assess for signs and symptoms of bleeding or hemorrhage  - Monitor labs  - Administer supportive blood products/factors as ordered and appropriate   Outcome: Progressing      Problem: MUSCULOSKELETAL - ADULT  Goal: Maintain or return mobility to safest level of function  INTERVENTIONS:  - Assess patient's ability to carry out ADLs; assess patient's baseline for ADL function and identify physical deficits which impact ability to perform ADLs (bathing, care of mouth/teeth, toileting, grooming, dressing, etc )  - Assess/evaluate cause of self-care deficits   - Assess range of motion  - Assess patient's mobility; develop plan if impaired  - Assess patient's need for assistive devices and provide as appropriate  - Encourage maximum independence but intervene and supervise when necessary  - Involve family in performance of ADLs  - Assess for home care needs following discharge   - Request OT consult to assist with ADL evaluation and planning for discharge  - Provide patient education as appropriate   Outcome: Progressing    Goal: Maintain proper alignment of affected body part  INTERVENTIONS:  - Support, maintain and protect limb and body alignment  - Provide pt/fam with appropriate education   Outcome: Progressing      Problem: DISCHARGE PLANNING - CARE MANAGEMENT  Goal: Discharge to post-acute care or home with appropriate resources  INTERVENTIONS:  - Conduct assessment to determine patient/family and health care team treatment goals, and need for post-acute services based on payer coverage, community resources, and patient preferences, and barriers to discharge  - Address psychosocial, clinical, and financial barriers to discharge as identified in assessment in conjunction with the patient/family and health care team  - Arrange appropriate level of post-acute services according to patient's   needs and preference and payer coverage in collaboration with the physician and health care team  - Communicate with and update the patient/family, physician, and health care team regarding progress on the discharge plan  - Arrange appropriate transportation to post-acute venues   Outcome: Progressing

## 2018-04-17 LAB
BACTERIA BLD CULT: NORMAL
BACTERIA BLD CULT: NORMAL
HEMOCCULT STL QL: NEGATIVE
HEMOCCULT STL QL: NEGATIVE

## 2018-04-17 PROCEDURE — 97166 OT EVAL MOD COMPLEX 45 MIN: CPT

## 2018-04-17 PROCEDURE — 97110 THERAPEUTIC EXERCISES: CPT

## 2018-04-17 PROCEDURE — 97116 GAIT TRAINING THERAPY: CPT

## 2018-04-17 PROCEDURE — 97530 THERAPEUTIC ACTIVITIES: CPT

## 2018-04-18 ENCOUNTER — HOSPITAL ENCOUNTER (EMERGENCY)
Facility: HOSPITAL | Age: 83
Discharge: HOME | End: 2018-04-18
Attending: EMERGENCY MEDICINE
Payer: MEDICARE

## 2018-04-18 VITALS
OXYGEN SATURATION: 96 % | DIASTOLIC BLOOD PRESSURE: 70 MMHG | SYSTOLIC BLOOD PRESSURE: 175 MMHG | RESPIRATION RATE: 18 BRPM | WEIGHT: 112.88 LBS | HEIGHT: 55 IN | HEART RATE: 79 BPM | BODY MASS INDEX: 26.12 KG/M2

## 2018-04-18 DIAGNOSIS — K64.8 INTERNAL HEMORRHOID: Primary | ICD-10-CM

## 2018-04-18 PROCEDURE — 99283 EMERGENCY DEPT VISIT LOW MDM: CPT

## 2018-04-18 PROCEDURE — 97535 SELF CARE MNGMENT TRAINING: CPT

## 2018-04-18 PROCEDURE — 97530 THERAPEUTIC ACTIVITIES: CPT

## 2018-04-18 PROCEDURE — 97110 THERAPEUTIC EXERCISES: CPT

## 2018-04-18 PROCEDURE — 97116 GAIT TRAINING THERAPY: CPT

## 2018-04-18 NOTE — DISCHARGE INSTRUCTIONS
Hemorrhoids   WHAT YOU NEED TO KNOW:   Hemorrhoids are swollen blood vessels inside your rectum (internal hemorrhoids) or on your anus (external hemorrhoids)  Sometimes a hemorrhoid may prolapse  This means it extends out of your anus  DISCHARGE INSTRUCTIONS:   Return to the emergency department if:   · You have severe pain in your rectum or around your anus  · You have severe pain in your abdomen and you are vomiting  · You have bleeding from your anus that soaks through your underwear  Contact your healthcare provider if:   · You have frequent and painful bowel movements  · Your hemorrhoid looks or feels more swollen than usual      · You do not have a bowel movement for 2 days or more  · You see or feel tissue coming through your anus  · You have questions or concerns about your condition or care  Medicines: You may  need any of the following:  · A pad, cream, or ointment  can help decrease pain, swelling, and itching  · Stool softeners  help treat or prevent constipation  · NSAIDs , such as ibuprofen, help decrease swelling, pain, and fever  NSAIDs can cause stomach bleeding or kidney problems in certain people  If you take blood thinner medicine, always ask your healthcare provider if NSAIDs are safe for you  Always read the medicine label and follow directions  · Take your medicine as directed  Contact your healthcare provider if you think your medicine is not helping or if you have side effects  Tell him or her if you are allergic to any medicine  Keep a list of the medicines, vitamins, and herbs you take  Include the amounts, and when and why you take them  Bring the list or the pill bottles to follow-up visits  Carry your medicine list with you in case of an emergency  Manage your symptoms:   · Apply ice on your anus for 15 to 20 minutes every hour or as directed  Use an ice pack, or put crushed ice in a plastic bag   Cover it with a towel before you apply it to your anus  Ice helps prevent tissue damage and decreases swelling and pain  · Take a sitz bath  Fill a bathtub with 4 to 6 inches of warm water  You may also use a sitz bath pan that fits inside a toilet bowl  Sit in the sitz bath for 15 minutes  Do this 3 times a day, and after each bowel movement  The warm water can help decrease pain and swelling  · Keep your anal area clean  Gently wash the area with warm water daily  Soap may irritate the area  After a bowel movement, wipe with moist towelettes or wet toilet paper  Dry toilet paper can irritate the area  Prevent hemorrhoids:   · Do not strain to have a bowel movement  Do not sit on the toilet too long  These actions can increase pressure on the tissues in your rectum and anus  · Drink plenty of liquids  Liquids can help prevent constipation  Ask how much liquid to drink each day and which liquids are best for you  · Eat a variety of high-fiber foods  Examples include fruits, vegetables, and whole grains  Ask your healthcare provider how much fiber you need each day  You may need to take a fiber supplement  · Exercise as directed  Exercise, such as walking, may make it easier to have a bowel movement  Ask your healthcare provider to help you create an exercise plan  · Do not have anal sex  Anal sex can weaken the skin around your rectum and anus  · Avoid heavy lifting  This can cause straining and increase your risk for another hemorrhoid  Follow up with your healthcare provider as directed:  Write down your questions so you remember to ask them during your visits  © 2017 [facility] Information is for End User's use only and may not be sold, redistributed or otherwise used for commercial purposes  All illustrations and images included in CareNotes® are the copyrighted property of [employer] , [employer]  or [employer]  The above information is an  only   It is not intended as medical advice for individual conditions or treatments  Talk to your doctor, nurse or pharmacist before following any medical regimen to see if it is safe and effective for you

## 2018-04-18 NOTE — ED PROVIDER NOTES
History  Chief Complaint   Patient presents with    Rectal Problems     patient was having a bowel movement tonight when told that her rectum prolapsed      57-year-old female patient presents to the emergency department from the nursing facility here and the hospital for evaluation of rectal prolapse  On physical exam the patient has a prolapsed internal hemorrhoid  The hemorrhoid was reduced easily  Patient be started on Proctofoam, follow up with Colorectal surgery, be discharged back up to the facility  History provided by:  Patient   used: No    Rectal Bleeding - Minor   Amount:  Scant  Timing:  Constant  Chronicity:  New  Context: hemorrhoids    Context: not anal fissures and not anal penetration    Similar prior episodes: no    Relieved by:  Nothing  Worsened by:  Nothing  Ineffective treatments:  None tried  Associated symptoms: no dizziness, no epistaxis, no fever and no hematemesis        Prior to Admission Medications   Prescriptions Last Dose Informant Patient Reported?  Taking?   atorvastatin (LIPITOR) 20 mg tablet   No Yes   Sig: Take 1 tablet by mouth daily with dinner for 30 days   doxazosin (CARDURA) 4 mg tablet   Yes Yes   Sig: Take 4 mg by mouth daily at bedtime   losartan-hydrochlorothiazide (HYZAAR) 100-12 5 MG per tablet   Yes Yes   Sig: Take 1 tablet by mouth daily   metoprolol tartrate (LOPRESSOR) 50 mg tablet   No Yes   Sig: Take 1 tablet (50 mg total) by mouth every 12 (twelve) hours   oxybutynin (DITROPAN) 5 mg tablet   Yes Yes   Sig: Take 5 mg by mouth daily     pantoprazole (PROTONIX) 40 mg tablet   No Yes   Sig: Take 1 tablet (40 mg total) by mouth 2 (two) times a day before meals      Facility-Administered Medications: None       Past Medical History:   Diagnosis Date    Acute respiratory failure with hypoxia and hypercapnia (HCC)     Arthritis     Asthma     Hypertension     Stroke ([facility] [address] )     residual RLE weakness       Past Surgical History: Procedure Laterality Date    CLOSED REDUCTION HUMERUS FRACTURE Right 11/29/2016    Procedure: CLOSED REDUCTION ARM/HUMERUS  CLOSED VS  OPEN SHOULDER REDUCTION;  Surgeon: Adolph Copeland MD;  Location: [facility];  Service:     EYE SURGERY      bilateral CAT EXT W/IOLs    RECTAL PROLAPSE REPAIR      done 5-6 yrs ago       Family History   Problem Relation Age of Onset    Hypertension Son      I have reviewed and agree with the history as documented  Social History   Substance Use Topics    Smoking status: Never Smoker    Smokeless tobacco: Never Used    Alcohol use No        Review of Systems   Constitutional: Negative for fever  HENT: Negative for nosebleeds  Gastrointestinal: Positive for hematochezia  Negative for hematemesis  Neurological: Negative for dizziness  All other systems reviewed and are negative  Physical Exam  ED Triage Vitals [04/18/18 0442]   Temp Pulse Respirations Blood Pressure SpO2   -- 79 18 (!) 175/70 96 %      Temp src Heart Rate Source Patient Position - Orthostatic VS BP Location FiO2 (%)   -- Monitor Lying Left arm --      Pain Score       No Pain           Orthostatic Vital Signs  Vitals:    04/18/18 0442   BP: (!) 175/70   Pulse: 79   Patient Position - Orthostatic VS: Lying       Physical Exam   Constitutional: She is oriented to person, place, and time  She appears well-developed and well-nourished  HENT:   Head: Normocephalic and atraumatic  Right Ear: External ear normal    Left Ear: External ear normal    Eyes: Conjunctivae and EOM are normal    Neck: No JVD present  No tracheal deviation present  No thyromegaly present  Cardiovascular: Normal rate  Pulmonary/Chest: Effort normal and breath sounds normal  No stridor  Abdominal: Soft  She exhibits no distension and no mass  There is no tenderness  There is no guarding  No hernia  Genitourinary:         Musculoskeletal: Normal range of motion  She exhibits no edema, tenderness or deformity  Lymphadenopathy:     She has no cervical adenopathy  Neurological: She is alert and oriented to person, place, and time  Skin: Skin is warm  No rash noted  No erythema  No pallor  Psychiatric: She has a normal mood and affect  Her behavior is normal    Nursing note and vitals reviewed  ED Medications  Medications - No data to display    Diagnostic Studies  Results Reviewed     None                 No orders to display              Procedures  Procedures       Phone Contacts  ED Phone Contact    ED Course  ED Course                                MDM  Number of Diagnoses or Management Options  Internal hemorrhoid: new and requires workup     Amount and/or Complexity of Data Reviewed  Decide to obtain previous medical records or to obtain history from someone other than the patient: yes  Review and summarize past medical records: yes    Patient Progress  Patient progress: stable    CritCare Time    Disposition  Final diagnoses:   Internal hemorrhoid - with prolapse, reduced     Time reflects when diagnosis was documented in both MDM as applicable and the Disposition within this note     Time User Action Codes Description Comment    4/18/2018  4:42 AM Tali Marine Add [K64 8] Internal hemorrhoid     4/18/2018  4:42 AM Tali Marine Modify [K64 8] Internal hemorrhoid with prolapse, reduced      ED Disposition     ED Disposition Condition Comment    Discharge  Donna Morales discharge to home/self care      Condition at discharge: Stable        Follow-up Information     Follow up With Specialties Details Why Fidencio Haley 49, DO General Surgery   [place] [address]  [phone]          Patient's Medications   Discharge Prescriptions    HYDROCORTISONE-PRAMOXINE [facility]) RECTAL FOAM    Insert 1 applicator into the rectum 2 (two) times a day       Start Date: 4/18/2018 End Date: --       Order Dose: 1 applicator       Quantity: 10 g    Refills: 0     No discharge procedures on file      ED Provider  Electronically Signed by           Kashmir Pinto,   04/18/18 9242

## 2018-04-19 PROCEDURE — 97530 THERAPEUTIC ACTIVITIES: CPT

## 2018-04-19 PROCEDURE — 97535 SELF CARE MNGMENT TRAINING: CPT

## 2018-04-19 PROCEDURE — 97110 THERAPEUTIC EXERCISES: CPT

## 2018-04-19 PROCEDURE — 97116 GAIT TRAINING THERAPY: CPT

## 2018-04-20 LAB
ERYTHROCYTE [DISTWIDTH] IN BLOOD BY AUTOMATED COUNT: 24.9 % (ref 11.6–15.1)
HCT VFR BLD AUTO: 31.5 % (ref 34.8–46.1)
HGB BLD-MCNC: 10 G/DL (ref 11.5–15.4)
MCH RBC QN AUTO: 26.7 PG (ref 26.8–34.3)
MCHC RBC AUTO-ENTMCNC: 31.7 G/DL (ref 31.4–37.4)
MCV RBC AUTO: 84 FL (ref 82–98)
PLATELET # BLD AUTO: 152 THOUSANDS/UL (ref 149–390)
RBC # BLD AUTO: 3.74 MILLION/UL (ref 3.81–5.12)
WBC # BLD AUTO: 7.36 THOUSAND/UL (ref 4.31–10.16)

## 2018-04-20 PROCEDURE — 97530 THERAPEUTIC ACTIVITIES: CPT

## 2018-04-20 PROCEDURE — 97110 THERAPEUTIC EXERCISES: CPT

## 2018-04-20 PROCEDURE — 97535 SELF CARE MNGMENT TRAINING: CPT

## 2018-04-20 PROCEDURE — 97116 GAIT TRAINING THERAPY: CPT

## 2018-04-20 PROCEDURE — 85027 COMPLETE CBC AUTOMATED: CPT | Performed by: INTERNAL MEDICINE

## 2018-04-21 PROCEDURE — 97535 SELF CARE MNGMENT TRAINING: CPT

## 2018-04-21 PROCEDURE — 97110 THERAPEUTIC EXERCISES: CPT

## 2018-04-23 PROCEDURE — 97530 THERAPEUTIC ACTIVITIES: CPT

## 2018-04-23 PROCEDURE — 97110 THERAPEUTIC EXERCISES: CPT

## 2018-04-23 PROCEDURE — 97535 SELF CARE MNGMENT TRAINING: CPT

## 2018-04-23 PROCEDURE — 97116 GAIT TRAINING THERAPY: CPT

## 2018-04-24 PROCEDURE — 97110 THERAPEUTIC EXERCISES: CPT

## 2018-04-24 PROCEDURE — 97530 THERAPEUTIC ACTIVITIES: CPT

## 2018-04-24 PROCEDURE — 97116 GAIT TRAINING THERAPY: CPT

## 2018-04-24 PROCEDURE — 97535 SELF CARE MNGMENT TRAINING: CPT

## 2018-04-25 PROCEDURE — 97110 THERAPEUTIC EXERCISES: CPT

## 2018-04-25 PROCEDURE — 97116 GAIT TRAINING THERAPY: CPT

## 2018-04-25 PROCEDURE — 97535 SELF CARE MNGMENT TRAINING: CPT

## 2018-04-25 PROCEDURE — 97530 THERAPEUTIC ACTIVITIES: CPT

## 2018-04-26 PROCEDURE — 97110 THERAPEUTIC EXERCISES: CPT

## 2018-04-26 PROCEDURE — 97535 SELF CARE MNGMENT TRAINING: CPT

## 2018-04-26 PROCEDURE — 97530 THERAPEUTIC ACTIVITIES: CPT

## 2018-04-26 PROCEDURE — 97116 GAIT TRAINING THERAPY: CPT

## 2018-04-27 ENCOUNTER — HOSPITAL ENCOUNTER (OUTPATIENT)
Dept: NON INVASIVE DIAGNOSTICS | Facility: HOSPITAL | Age: 83
Discharge: HOME | End: 2018-04-27
Payer: MEDICARE

## 2018-04-27 ENCOUNTER — OFFICE VISIT (OUTPATIENT)
Dept: SURGERY | Facility: HOSPITAL | Age: 83
End: 2018-04-27
Payer: MEDICARE

## 2018-04-27 DIAGNOSIS — K92.2 GASTROINTESTINAL HEMORRHAGE, UNSPECIFIED GASTROINTESTINAL HEMORRHAGE TYPE: Primary | ICD-10-CM

## 2018-04-27 DIAGNOSIS — K62.3 RECTAL PROLAPSE: ICD-10-CM

## 2018-04-27 LAB
ANION GAP SERPL CALCULATED.3IONS-SCNC: 5 MMOL/L (ref 4–13)
BASOPHILS # BLD AUTO: 0.03 THOUSANDS/ΜL (ref 0–0.1)
BASOPHILS NFR BLD AUTO: 1 % (ref 0–1)
BUN SERPL-MCNC: 61 MG/DL (ref 5–25)
CALCIUM SERPL-MCNC: 8.1 MG/DL (ref 8.3–10.1)
CHLORIDE SERPL-SCNC: 105 MMOL/L (ref 100–108)
CHOLEST SERPL-MCNC: 117 MG/DL (ref 50–200)
CO2 SERPL-SCNC: 30 MMOL/L (ref 21–32)
CREAT SERPL-MCNC: 1.46 MG/DL (ref 0.6–1.3)
EOSINOPHIL # BLD AUTO: 0.1 THOUSAND/ΜL (ref 0–0.61)
EOSINOPHIL NFR BLD AUTO: 2 % (ref 0–6)
ERYTHROCYTE [DISTWIDTH] IN BLOOD BY AUTOMATED COUNT: 24.7 % (ref 11.6–15.1)
GFR SERPL CREATININE-BSD FRML MDRD: 34 ML/MIN/1.73SQ M
GLUCOSE SERPL-MCNC: 112 MG/DL (ref 65–140)
HCT VFR BLD AUTO: 29.5 % (ref 34.8–46.1)
HDLC SERPL-MCNC: 66 MG/DL (ref 40–60)
HGB BLD-MCNC: 9 G/DL (ref 11.5–15.4)
LDLC SERPL CALC-MCNC: 44 MG/DL (ref 0–100)
LYMPHOCYTES # BLD AUTO: 1.56 THOUSANDS/ΜL (ref 0.6–4.47)
LYMPHOCYTES NFR BLD AUTO: 28 % (ref 14–44)
MCH RBC QN AUTO: 25.9 PG (ref 26.8–34.3)
MCHC RBC AUTO-ENTMCNC: 30.5 G/DL (ref 31.4–37.4)
MCV RBC AUTO: 85 FL (ref 82–98)
MONOCYTES # BLD AUTO: 0.49 THOUSAND/ΜL (ref 0.17–1.22)
MONOCYTES NFR BLD AUTO: 9 % (ref 4–12)
NEUTROPHILS # BLD AUTO: 3.43 THOUSANDS/ΜL (ref 1.85–7.62)
NEUTS SEG NFR BLD AUTO: 60 % (ref 43–75)
NONHDLC SERPL-MCNC: 51 MG/DL
PLATELET # BLD AUTO: 224 THOUSANDS/UL (ref 149–390)
PMV BLD AUTO: 10.1 FL (ref 8.9–12.7)
POTASSIUM SERPL-SCNC: 4.6 MMOL/L (ref 3.5–5.3)
RBC # BLD AUTO: 3.47 MILLION/UL (ref 3.81–5.12)
SODIUM SERPL-SCNC: 140 MMOL/L (ref 136–145)
TRIGL SERPL-MCNC: 33 MG/DL
WBC # BLD AUTO: 5.61 THOUSAND/UL (ref 4.31–10.16)

## 2018-04-27 PROCEDURE — 97535 SELF CARE MNGMENT TRAINING: CPT

## 2018-04-27 PROCEDURE — 97110 THERAPEUTIC EXERCISES: CPT

## 2018-04-27 PROCEDURE — 97530 THERAPEUTIC ACTIVITIES: CPT

## 2018-04-27 PROCEDURE — 97116 GAIT TRAINING THERAPY: CPT

## 2018-04-27 PROCEDURE — 93306 TTE W/DOPPLER COMPLETE: CPT

## 2018-04-27 PROCEDURE — 80061 LIPID PANEL: CPT | Performed by: INTERNAL MEDICINE

## 2018-04-27 PROCEDURE — 85025 COMPLETE CBC W/AUTO DIFF WBC: CPT | Performed by: INTERNAL MEDICINE

## 2018-04-27 PROCEDURE — 93306 TTE W/DOPPLER COMPLETE: CPT | Performed by: INTERNAL MEDICINE

## 2018-04-27 PROCEDURE — 99204 OFFICE O/P NEW MOD 45 MIN: CPT | Performed by: SURGERY

## 2018-04-27 PROCEDURE — 80048 BASIC METABOLIC PNL TOTAL CA: CPT | Performed by: INTERNAL MEDICINE

## 2018-04-29 PROCEDURE — 97110 THERAPEUTIC EXERCISES: CPT

## 2018-04-30 LAB
ANION GAP SERPL CALCULATED.3IONS-SCNC: 5 MMOL/L (ref 4–13)
BUN SERPL-MCNC: 48 MG/DL (ref 5–25)
CALCIUM SERPL-MCNC: 8.6 MG/DL (ref 8.3–10.1)
CHLORIDE SERPL-SCNC: 106 MMOL/L (ref 100–108)
CO2 SERPL-SCNC: 30 MMOL/L (ref 21–32)
CREAT SERPL-MCNC: 1.17 MG/DL (ref 0.6–1.3)
FOLATE SERPL-MCNC: 16.4 NG/ML (ref 3.1–17.5)
GFR SERPL CREATININE-BSD FRML MDRD: 44 ML/MIN/1.73SQ M
GLUCOSE SERPL-MCNC: 121 MG/DL (ref 65–140)
POTASSIUM SERPL-SCNC: 4.5 MMOL/L (ref 3.5–5.3)
SODIUM SERPL-SCNC: 141 MMOL/L (ref 136–145)
VIT B12 SERPL-MCNC: 490 PG/ML (ref 100–900)

## 2018-04-30 PROCEDURE — 80048 BASIC METABOLIC PNL TOTAL CA: CPT | Performed by: NURSE PRACTITIONER

## 2018-04-30 PROCEDURE — 97110 THERAPEUTIC EXERCISES: CPT

## 2018-04-30 PROCEDURE — 97530 THERAPEUTIC ACTIVITIES: CPT

## 2018-04-30 PROCEDURE — 82746 ASSAY OF FOLIC ACID SERUM: CPT | Performed by: NURSE PRACTITIONER

## 2018-04-30 PROCEDURE — 82607 VITAMIN B-12: CPT | Performed by: NURSE PRACTITIONER

## 2018-05-01 PROCEDURE — 97110 THERAPEUTIC EXERCISES: CPT

## 2018-05-01 PROCEDURE — 97116 GAIT TRAINING THERAPY: CPT

## 2018-05-01 PROCEDURE — 97530 THERAPEUTIC ACTIVITIES: CPT

## 2018-05-02 PROCEDURE — 97110 THERAPEUTIC EXERCISES: CPT

## 2018-05-02 PROCEDURE — 97530 THERAPEUTIC ACTIVITIES: CPT

## 2018-05-02 PROCEDURE — 97535 SELF CARE MNGMENT TRAINING: CPT

## 2018-05-03 ENCOUNTER — TELEPHONE (OUTPATIENT)
Dept: SURGERY | Facility: HOSPITAL | Age: 83
End: 2018-05-03

## 2018-05-03 PROBLEM — K92.1 HEMATOCHEZIA: Status: ACTIVE | Noted: 2017-01-11

## 2018-05-03 PROBLEM — K62.3 RECTAL PROLAPSE: Status: ACTIVE | Noted: 2018-05-03

## 2018-05-03 NOTE — TELEPHONE ENCOUNTER
----- Message from [facility] sent at 5/3/2018  1:21 PM EDT -----  Regarding: Renata Lindo  Patient has an appointment with GI beginning of June  Is this ok or is it urgent that she is seen sooner? Thanks!

## 2018-05-03 NOTE — ASSESSMENT & PLAN NOTE
Patient is currently healed hemodynamically stable     No abdominal pain  Tolerating diet    -increase fiber in the diet but weight fruits and vegetables  Also recommended starting Metamucil 1 tsp per day all discussed of water   -avoid straining during bowel movements, may use MiraLax as needed  -patient with history of GI bleed although no gross blood noted on exam and therefore would recommend follow with GI for elective colonoscopy  -would routinely check CBC to trend H&H , transfuse as necessary

## 2018-05-03 NOTE — PROGRESS NOTES
Assessment/Plan:    GI bleed  -referral to GI for elective EGD/colonoscopy for GI bleeding  Rectal prolapse  -increase fiber in the diet but weight fruits and vegetables  Also recommended starting Metamucil 1 tsp per day all discussed of water   -avoid straining during bowel movements, may use MiraLax as needed  -patient with history of GI bleed although no gross blood noted on exam and therefore would recommend follow with GI for elective colonoscopy  Diagnoses and all orders for this visit:    Gastrointestinal hemorrhage, unspecified gastrointestinal hemorrhage type    Rectal prolapse          Subjective:      Patient ID: Jovany Jamison is a 82 y o  female  74-year-old female with a history of hyperlipidemia, hypertension, hematochezia, who currently resides in the 5th floor rehab setting at [address] [place] [address], presents for evaluation of potential rectal prolapse  Patient has a recent history of GI bleed in addition [facility] where she was transfused and subsequently discharged to rehab  That time patient refused EGD and colonoscopy  She also recently seen in the ER for was thought was possible prolapsing internal hemorrhoids versus rectal prolapse  That time the prolapse was reduced  As per the patient and the patient's son there is prolapse intermittently especially after bowel movements  This prolapse is reducible  There is occasional intermittent bleeding with this  The patient denies recent colonoscopy year EGD  States bowel movements are intermittent and there are issues with constipation  Currently the patient takes no supplementation as far as fiber and does not take laxatives  Denies any chest pain or shortness of breath  No abdominal pain  The following portions of the patient's history were reviewed and updated as appropriate:   She  has a past medical history of Acute respiratory failure with hypoxia and hypercapnia ([address] ); Arthritis; Asthma;  Hypertension; and Stroke [facility])  She   Patient Active Problem List    Diagnosis Date Noted    Rectal prolapse 05/03/2018    Vaginal prolapse 04/13/2018    Elevated troponin 04/13/2018    Acute blood loss anemia 04/12/2018    Hematochezia 01/11/2017    Urinary retention 12/08/2016    Benign essential hypertension 12/01/2016    GI bleed 12/01/2016    Hyperlipidemia 12/01/2016    Atrial fibrillation ([facility] ) 12/01/2016    Anemia 12/01/2016    Dislocation of shoulder region, right, initial encounter 11/29/2016    Acute respiratory failure with hypoxia and hypercapnia ([facility] [facility] ) 11/29/2016     She  has a past surgical history that includes Eye surgery; Rectal prolapse repair; Closed reduction humerus fracture (Right, 11/29/2016); and Closed reduction humerus fracture (Right, 4/13/2018)  Her family history includes Hypertension in her son  She  reports that she has never smoked  She has never used smokeless tobacco  She reports that she does not drink alcohol or use drugs  Current Outpatient Prescriptions   Medication Sig Dispense Refill    atorvastatin (LIPITOR) 20 mg tablet Take 1 tablet by mouth daily with dinner for 30 days 30 tablet 0    doxazosin (CARDURA) 4 mg tablet Take 4 mg by mouth daily at bedtime      hydrocortisone-pramoxine (PROCTOFOAM-HC) rectal foam Insert 1 applicator into the rectum 2 (two) times a day 10 g 0    losartan-hydrochlorothiazide (HYZAAR) 100-12 5 MG per tablet Take 1 tablet by mouth daily      metoprolol tartrate (LOPRESSOR) 50 mg tablet Take 1 tablet (50 mg total) by mouth every 12 (twelve) hours  0    oxybutynin (DITROPAN) 5 mg tablet Take 5 mg by mouth daily        pantoprazole (PROTONIX) 40 mg tablet Take 1 tablet (40 mg total) by mouth 2 (two) times a day before meals  0     No current facility-administered medications for this visit  She has No Known Allergies       Review of Systems      A 10 point review of systems was conducted, all negative except as noted above HPI     Objective: There were no vitals taken for this visit  Physical Exam   Constitutional: She is oriented to person, place, and time  She appears well-developed and well-nourished  No distress  HENT:   Head: Normocephalic and atraumatic  Eyes: No scleral icterus  Neck: Normal range of motion  No tracheal deviation present  Pulmonary/Chest: No respiratory distress  Abdominal: Soft  She exhibits no distension and no mass  There is no tenderness  There is no rebound and no guarding  Genitourinary: Rectal exam shows internal hemorrhoid and anal tone abnormal (Anal tone is poor  )  Rectal exam shows no external hemorrhoid, no fissure, no mass, no tenderness and guaiac negative stool  Genitourinary Comments: Upon rectal examination the including HAZEL there is no gross masses or lesions identified  No gross blood identified externally normal on with HAZEL  There was noted be somewhat poor resting pressure/tone of the anal sphincter  Squeeze pressure was adequate  Upon Valsalva there is concentric prolapse of what appeared to be likely internal hemorrhoids but also rectal mucosa  No gross blood again identified  This prolapse easily self reduced  Musculoskeletal: Normal range of motion  She exhibits no edema, tenderness or deformity  Lymphadenopathy:     She has no cervical adenopathy  Neurological: She is alert and oriented to person, place, and time  No cranial nerve deficit  Skin: Skin is warm  No rash noted  She is not diaphoretic  No erythema  No pallor  Psychiatric: She has a normal mood and affect   Her behavior is normal

## 2018-05-04 ENCOUNTER — TELEPHONE (OUTPATIENT)
Dept: GASTROENTEROLOGY | Facility: HOSPITAL | Age: 83
End: 2018-05-04

## 2018-05-04 NOTE — TELEPHONE ENCOUNTER
Called patient to offer appointment on 5/4/2018 @ 2:20 with Dr Ricardo Redd  As per request of Dr Dean Sebastian, patient must be seen sooner than June  Left message on machine for patient to contact our office back

## 2018-07-08 PROBLEM — R73.9 HYPERGLYCEMIA: Status: ACTIVE | Noted: 2018-01-01

## 2018-07-08 PROBLEM — E87.1 HYPONATREMIA: Status: ACTIVE | Noted: 2018-01-01

## 2018-07-08 PROBLEM — N17.9 ACUTE KIDNEY INJURY (HCC): Status: ACTIVE | Noted: 2018-01-01

## 2018-07-08 PROBLEM — D72.829 LEUKOCYTOSIS: Status: ACTIVE | Noted: 2018-01-01

## 2018-07-08 PROBLEM — R53.1 GENERALIZED WEAKNESS: Status: ACTIVE | Noted: 2018-01-01

## 2018-07-08 PROBLEM — K56.600 PARTIAL SMALL BOWEL OBSTRUCTION (HCC): Status: ACTIVE | Noted: 2018-01-01

## 2018-07-08 NOTE — ED NOTES
Dr Cristofer Araya at bedside with [facility] and Dr Vimal Cartagena to reduce hernia  Hernia appears to have self reduced  Pt resting comfortably with family at bedside, expressed no further needs at this time and call bell is within reach  Aware that urine sample is needed        Nathan Watts, RN  07/08/18 2859

## 2018-07-09 PROBLEM — R22.31 MASS OF RIGHT AXILLA: Status: ACTIVE | Noted: 2018-01-01

## 2018-07-09 NOTE — ASSESSMENT & PLAN NOTE
-Blood pressure is low up arrival to ER  -Hold antihypertensive for now  -IV normal saline  -Monitor blood pressure

## 2018-07-09 NOTE — CONSULTS
Consultation - Nephrology   Krain Gu 82 y o  female MRN: [id number]  Unit/Bed#: 417-01 Encounter: [id number]      A/P:  1  Acute kidney injury:  Most probably due to decreased p o  intake and continuation of losartan HCT  She has a history acute kidney injury in the past   This resolved  with treatment and her baseline creatinine is approximately 0 94  She has normal kidneys on CT scan but will obtain a dedicated ultrasound, urine electrolytes and urine microalbuminuria:  Creatinine ratio  Her function is improving with IVF  2  Hyponatremia: due to hypovolemia and use of a thiazide which has been held and improving  She was hyperglycemic which may be contributing  3  Leukocytosis: has been pancultured  4  Partial small bowel obstruction: resolving with conservative care  She denies tenderness and has bowel sounds  Thank you for allowing us to participate in the care of your patient  Please feel free to contact us regarding the care of this patient, or any other questions/concerns that may be applicable  Patient Active Problem List   Diagnosis    Dislocation of shoulder region, right, initial encounter    Acute respiratory failure with hypoxia and hypercapnia (HCC)    Benign essential hypertension    GI bleed    Hyperlipidemia    Atrial fibrillation (HCC)    Anemia    Acute blood loss anemia    Vaginal prolapse    Elevated troponin    Hematochezia    Urinary retention    Rectal prolapse    Generalized weakness    Acute kidney injury (HCC)    Hyponatremia    Partial small bowel obstruction (HCC)    Hyperglycemia    Leukocytosis       History of Present Illness   Physician Requesting Consult: Alise Jaime MD  Reason for Consult / Principal Problem: acute kidney injury  Hx and PE limited by:   HPI: Karin Gu is a 82y o  year old female who presents with weakness and decreased p o  intake    She had increasing dizziness and had a change in her activity level and has not been able to walk without support  She was admitted and found to have acute kidney injury and a partial small-bowel obstruction  She was found to have hyponatremia dating a renal consultation  She denies any history of kidney disease or diabetes she does not know if she ever had a problem with her kidneys before  Baseline creatinine was elevated early in 2016 but improved to 0 84 last year  History obtained from chart review and the patient    Review of Systems - Negative except as mentioned above in HPI, more specifics as mentioned below    Review of Systems - General ROS: positive for  - fatigue  Ophthalmic ROS: negative  ENT ROS: negative  Endocrine ROS: negative  Respiratory ROS: no cough, shortness of breath, or wheezing  Cardiovascular ROS: no chest pain or dyspnea on exertion  Gastrointestinal ROS: positive for - appetite loss  Genito-Urinary ROS: no dysuria, trouble voiding, or hematuria  Musculoskeletal ROS: negative  Neurological ROS: positive for - impaired coordination/balance  Dermatological ROS: negative    Historical Information   Past Medical History:   Diagnosis Date    Acute respiratory failure with hypoxia and hypercapnia (HCC)     Arthritis     Asthma     Hypertension     Stroke ([address] )     residual RLE weakness     Past Surgical History:   Procedure Laterality Date    CLOSED REDUCTION HUMERUS FRACTURE Right 11/29/2016    Procedure: CLOSED REDUCTION ARM/HUMERUS  CLOSED VS  OPEN SHOULDER REDUCTION;  Surgeon: Ele Torres MD;  Location: [facility];  Service:     CLOSED REDUCTION HUMERUS FRACTURE Right 4/13/2018    Procedure: CLOSED REDUCTION ARM/HUMERUS;  Surgeon: Ele Torres MD;  Location: [facility];  Service: Orthopedics    EYE SURGERY      bilateral CAT EXT W/IOLs    RECTAL PROLAPSE REPAIR      done 5-6 yrs ago     Social History   History   Alcohol Use No     History   Drug Use No     History   Smoking Status    Never Smoker   Smokeless Tobacco    Never Used     Family History Problem Relation Age of Onset    Hypertension Son        Meds/Allergies   all current active meds have been reviewed, current meds: Current Facility-Administered Medications   Medication Dose Route Frequency    acetaminophen (TYLENOL) tablet 650 mg  650 mg Oral Q6H PRN    atorvastatin (LIPITOR) tablet 20 mg  20 mg Oral Daily With Dinner    doxazosin (CARDURA) tablet 4 mg  4 mg Oral HS    hydrocortisone-pramoxine (ANALPRAM-HC) 2 5-1 % rectal cream   Rectal BID    metoprolol tartrate (LOPRESSOR) tablet 50 mg  50 mg Oral Q12H [address]    ondansetron (ZOFRAN) injection 4 mg  4 mg Intravenous Q6H PRN    oxybutynin (DITROPAN) tablet 5 mg  5 mg Oral Daily    pantoprazole (PROTONIX) EC tablet 40 mg  40 mg Oral BID AC    sodium chloride 0 9 % infusion  125 mL/hr Intravenous Continuous    and PTA meds:  Prescriptions Prior to Admission   Medication    atorvastatin (LIPITOR) 20 mg tablet    doxazosin (CARDURA) 4 mg tablet    losartan-hydrochlorothiazide (HYZAAR) 100-12 5 MG per tablet    metoprolol tartrate (LOPRESSOR) 50 mg tablet    oxybutynin (DITROPAN) 5 mg tablet    pantoprazole (PROTONIX) 40 mg tablet    hydrocortisone-pramoxine (PROCTOFOAM-HC) rectal foam         No Known Allergies    Objective     Intake/Output Summary (Last 24 hours) at 07/09/18 0950  Last data filed at 07/09/18 0842   Gross per 24 hour   Intake          1414 58 ml   Output                0 ml   Net          1414 58 ml       Invasive Devices:        Physical Exam      No intake/output data recorded      Vitals:    07/09/18 0749   BP: 119/56   Pulse: 87   Resp: 18   Temp: 99 9 °F (37 7 °C)   SpO2: 96%       Gen: in NAD, wanting to sleep  HEENT: no sclerous icterus, MMM, neck supple  CV: +S1/S2, RRR  Lungs: CTA bilaterally  Abd: +BS, soft NT/ND  Ext: all four extremities are warm  Skin: no rashes noted  Neuro: CN II-XII intact    Current Weight: Weight - Scale: 49 6 kg (109 lb 5 6 oz)  First Weight: Weight - Scale: 48 8 kg (107 lb 8 oz)    Lab Results:  I have personally reviewed pertinent labs      CBC: Lab Results   Component Value Date    WBC 13 17 (H) 07/09/2018    HGB 8 3 (L) 07/09/2018    HCT 25 3 (L) 07/09/2018    MCV 94 07/09/2018     (L) 07/09/2018    MCH 30 7 07/09/2018    MCHC 32 8 07/09/2018    RDW 16 8 (H) 07/09/2018    MPV 12 0 07/09/2018     CMP: Lab Results   Component Value Date     07/09/2018    K 4 1 07/09/2018     07/09/2018    CO2 23 07/09/2018    ANIONGAP 9 07/09/2018    BUN 45 (H) 07/09/2018    CREATININE 1 96 (H) 07/09/2018    GLUCOSE 120 07/09/2018    CALCIUM 8 0 (L) 07/09/2018    AST 19 07/09/2018    ALT 15 07/09/2018    ALKPHOS 68 07/09/2018    PROT 5 8 (L) 07/09/2018    BILITOT 0 60 07/09/2018    EGFR 23 07/09/2018     Phosphorus:   Lab Results   Component Value Date    PHOS 3 0 07/09/2018     Magnesium:   Lab Results   Component Value Date    MG 1 9 07/09/2018     Urinalysis: Lab Results   Component Value Date    COLORU Yellow 07/08/2018    CLARITYU Slightly Cloudy 07/08/2018    SPECGRAV 1 025 07/08/2018    PHUR 5 0 07/08/2018    LEUKOCYTESUR Negative 07/08/2018    NITRITE Negative 07/08/2018    PROTEINUA Negative 07/08/2018    GLUCOSEU Negative 07/08/2018    KETONESU Negative 07/08/2018    BILIRUBINUR Negative 07/08/2018    BLOODU Negative 07/08/2018     Ionized Calcium: No results found for: CAION  Coagulation:   Lab Results   Component Value Date    INR 1 11 07/09/2018     Troponin:   Lab Results   Component Value Date    TROPONINI <0 02 07/08/2018     ABG: No results found for: PHART, QYU5ACE, PO2ART, JUL8PTG, T8ZTOATS, BEART, SOURCE      Results from last 7 days  Lab Units 07/09/18  0449 07/08/18  1306   SODIUM mmol/L 137 133*   POTASSIUM mmol/L 4 1 4 6   CHLORIDE mmol/L 105 99*   CO2 mmol/L 23 24   BUN mg/dL 45* 43*   CREATININE mg/dL 1 96* 2 47*   CALCIUM mg/dL 8 0* 8 7   TOTAL PROTEIN g/dL 5 8* 7 0   BILIRUBIN TOTAL mg/dL 0 60 0 90   ALK PHOS U/L 68 79   ALT U/L 15 18   AST U/L 19 19 GLUCOSE RANDOM mg/dL 120 232*       Radiology review:  Procedure: Ct Chest Abdomen Pelvis Wo Contrast    Result Date: 7/8/2018  Narrative: CT CHEST, ABDOMEN AND PELVIS WITHOUT IV CONTRAST INDICATION:   Shortness of breath, abdominal pain, hypotensive, renal failure  COMPARISON: 4/12/2018  TECHNIQUE: CT examination of the chest, abdomen and pelvis was performed without intravenous contrast   Axial, sagittal, and coronal 2D reformatted images were created from the source data and submitted for interpretation  Radiation dose length product (DLP) for this visit:  281 88 mGy-cm   This examination, like all CT scans performed in the [facility], was performed utilizing techniques to minimize radiation dose exposure, including the use of iterative  reconstruction and automated exposure control  Enteric contrast was administered  FINDINGS: CHEST LUNGS:  Lungs are clear  There is no tracheal or endobronchial lesion  PLEURA:  Unremarkable  HEART/GREAT VESSELS:  Normal heart size  Coronary artery calcifications noted  Normal caliber thoracic aorta with mild atherosclerotic calcifications  MEDIASTINUM AND BABAR:  Unremarkable  CHEST WALL AND LOWER NECK:   Multiple chronic right rib fractures again identified  There is a fairly well-circumscribed 5 4 x 4 9 cm collection of simple fluid in the right axillary region, which may represent a seroma or resolving hematoma  ABDOMEN Absence of intravenous contrast enhancement limits evaluation of the solid abdominal viscera  LIVER/BILIARY TREE:  Unremarkable  GALLBLADDER:  No calcified gallstones  No pericholecystic inflammatory change  SPLEEN:  Unremarkable  PANCREAS:  Unremarkable  ADRENAL GLANDS:  Unremarkable  KIDNEYS/URETERS:  Unremarkable  No hydronephrosis   STOMACH AND BOWEL:  Several mildly dilated small bowel loops in the lower abdomen to the level of a herniated loop of bowel in a small right paramedian ventral hernia in the lower abdomen, concerning for early or partial small bowel obstruction  APPENDIX:  No findings to suggest appendicitis  ABDOMINOPELVIC CAVITY:  No ascites or free intraperitoneal air  No lymphadenopathy  VESSELS:  Unremarkable for patient's age  PELVIS REPRODUCTIVE ORGANS:  Unremarkable for patient's age  URINARY BLADDER:  Unremarkable  ABDOMINAL WALL/INGUINAL REGIONS:  Small right paramedian ventral hernia in the lower abdomen containing a loop of small bowel, as above  This is new since the prior exam  OSSEOUS STRUCTURES:  No acute fracture or destructive osseous lesion  Chronic fractures of the right ribs, bilateral sacral ala, and left pubic rami appear unchanged  Chronic right shoulder dislocation appears unchanged  Unchanged vertebral wedge deformity of L2  Impression: Findings concerning for early or partial small bowel obstruction related to a herniated small bowel loop within a right lower quadrant ventral hernia  5 4 cm fluid collection in the right axilla, new since the prior study  This may represent a seroma or resolving hematoma  Multiple chronic right rib and pelvic fractures  Chronic right shoulder dislocation also again identified  I personally discussed this study with Junior Lyndsay on 7/8/2018 at 3:37 PM  Workstation performed: [id number]         EKG, Pathology, and Other Studies: reviewed      Counseling / Coordination of Care  Total floor / unit time spent today 35 minutes  Greater than 50% of total time was spent with the patient and / or family counseling and / or coordination of care   A description of the counseling / coordination of care: follows    Jorge Dennison MD

## 2018-07-09 NOTE — CASE MANAGEMENT
Initial Clinical Review    Thank you,  Delio Harris  [facility] Utilization Review Department  Phone: [phone]; Fax [phone]  ATTENTION: The Network Utilization Review Department is now centralized for our 9 Facilities  Make a note that we have a new phone and fax numbers for our Department  Please call with any questions or concerns to [phone] and carefully follow the prompts so that you are directed to the right person  All voicemails are confidential  Fax any determinations, approvals, denials, and requests for initial or continue stay review clinical to [phone]  Due to HIGH CALL volume, it would be easier if you could please send faxed requests to expedite your requests and in part, help us provide discharge notifications faster  Admission: Date/Time/Statement: 7/8/18 @ 2116     Orders Placed This Encounter   Procedures    Inpatient Admission (expected length of stay for this patient is greater than two midnights)     Standing Status:   Standing     Number of Occurrences:   1     Order Specific Question:   Admitting Physician     Answer:   Janey Jarrett     Order Specific Question:   Level of Care     Answer:   Med Surg [16]     Order Specific Question:   Estimated length of stay     Answer:   More than 2 Midnights     Order Specific Question:   Certification     Answer:   I certify that inpatient services are medically necessary for this patient for a duration of greater than two midnights  See H&P and MD Progress Notes for additional information about the patient's course of treatment         ED: Date/Time/Mode of Arrival:   ED Arrival Information     Expected Arrival Acuity Means of Arrival Escorted By Service Admission Type    7/8/2018 12:39 7/8/2018 12:38 [facility]gent [facility] WITH [facility] General Medicine Urgent    Arrival Complaint    FALL          Chief Complaint:   Chief Complaint   Patient presents with    Weakness - Generalized     Generalized weakness since yesterday       History of Illness:  82 y o  female who presents with to the ER complaining of generalized weakness over the past two day  Patient states that she is also having episodes of dizziness and the weakness seem to be getting worst  She states that she is unable to walk without support and has not been eating well  She denies, chest pain, SOB, N/V/D, fever, chills, Abdominal pain  She states that she has been having decreased urine output  She does have hx of urinary retention       ED Vital Signs:   ED Triage Vitals [07/08/18 1241]   Temperature Pulse Respirations Blood Pressure SpO2   98 9 °F (37 2 °C) 80 18 97/71 100 %      Temp Source Heart Rate Source Patient Position - Orthostatic VS BP Location FiO2 (%)   Temporal Monitor Lying Left arm --      Pain Score       No Pain        Wt Readings from Last 1 Encounters:   07/09/18 49 6 kg (109 lb 5 6 oz)       Vital Signs (abnormal): BP 81/45    Abnormal Labs/Diagnostic Test Results:    Ref Range & Units 07/08/18 1306   Sodium 136 - 145 mmol/L 133     Potassium 3 5 - 5 3 mmol/L 4 6    Chloride 100 - 108 mmol/L 99     CO2 21 - 32 mmol/L 24    Anion Gap 4 - 13 mmol/L 10    BUN 5 - 25 mg/dL 43     Creatinine 0 60 - 1 30 mg/dL 2 47     Glucose 65 - 140 mg/dL 232     Calcium 8 3 - 10 1 mg/dL 8 7    AST 5 - 45 U/L 19    ALT 12 - 78 U/L 18    Alkaline Phosphatase 46 - 116 U/L 79    Total Protein 6 4 - 8 2 g/dL 7 0    Albumin 3 5 - 5 0 g/dL 2 8     Total Bilirubin 0 20 - 1 00 mg/dL 0 90      WBC 4 31 - 10 16 Thousand/uL 17 51     RBC 3 81 - 5 12 Million/uL 3 04     Hemoglobin 11 5 - 15 4 g/dL 9 1     Hematocrit 34 8 - 46 1 % 28 1     MCV 82 - 98 fL 92    MCH 26 8 - 34 3 pg 29 9    MCHC 31 4 - 37 4 g/dL 32 4    RDW 11 6 - 15 1 % 16 7     MPV 8 9 - 12 7 fL 11 0    Platelets 254 - 896 Thousands/uL 129       Blood cxs -- (P)    CT a/p -- Findings concerning for early or partial small bowel obstruction related to a herniated small bowel loop within a right lower quadrant ventral hernia  5 4 cm fluid collection in the right axilla, new since the prior study  This may represent a seroma or resolving hematoma  Multiple chronic right rib and pelvic fractures  Chronic right shoulder dislocation also again identified  ED Treatment:   Medication Administration from 07/08/2018 1238 to 07/08/2018 2210       Date/Time Order Dose Route Action Action by Comments     07/08/2018 1429 sodium chloride 0 9 % bolus 500 mL 500 mL Intravenous New Bag Crystal Unger RN      07/08/2018 1621 sodium chloride 0 9 % bolus 1,000 mL 1,000 mL Intravenous [address] Raphael Conrad RN      07/08/2018 2001 sodium chloride 0 9 % infusion 125 mL/hr Intravenous New Bag Raphael Conrad RN           Past Medical/Surgical History:    Active Ambulatory Problems     Diagnosis Date Noted    Dislocation of shoulder region, right, initial encounter 11/29/2016    Acute respiratory failure with hypoxia and hypercapnia (HCC) 11/29/2016    Benign essential hypertension 12/01/2016    GI bleed 12/01/2016    Hyperlipidemia 12/01/2016    Atrial fibrillation ([facility] [address] ) 12/01/2016    Anemia 12/01/2016    Acute blood loss anemia 04/12/2018    Vaginal prolapse 04/13/2018    Elevated troponin 04/13/2018    Hematochezia 01/11/2017    Urinary retention 12/08/2016    Rectal prolapse 05/03/2018     Past Medical History:   Diagnosis Date    Acute respiratory failure with hypoxia and hypercapnia (HCC)     Arthritis     Asthma     Hypertension     Stroke [facility])        Admitting Diagnosis: Altered mental status [R41 82]  NANETTE (acute kidney injury) ([facility] [address] ) [N17 9]  Generalized weakness [R53 1]    Age/Sex: 82 y o  female    Assessment/Plan:   Partial small bowel obstruction (HCC)   Assessment & Plan     -CT abdomen and pelvis shows- Findings concerning for early or partial small bowel obstruction related to a herniated small bowel loop within a right lower quadrant ventral hernia   -No reports of abdominal pain, nausea, vomiting  -Hernia was reduced in ER  -Case discussed with surgery at [facility]  -Recommends no antibiotic   -pain management PRN  -NPO for now, will advance in AM  -Repeat imaging study and  with new onset of abdominal pain, nausea vomiting,   -surgery consult  -Supportive care          * Acute kidney injury ([address] )   Assessment & Plan     -Creatinine at 2 7  -No documented Hx of chronic kidney disease  -Decreased urinary output, probably secondary to dehydration  -Admit to med/surg  -IV normal saline  -Hold Cozaar for now  -AM CMP  -Nephrology Consult          Leukocytosis   Assessment & Plan     -WBC at 17 51  -No fever, chills reported  -No clear source of infection  -Will monitor of antibiotics   -Blood cultures pending  -Urine cultures pending  -IV normal saline  -Monitor CBC          Hyperglycemia   Assessment & Plan     -Glucose at 232  -no hx of diabetes  -Will check A1C  -AM CMP          Hyponatremia   Assessment & Plan     -Sodium at 133  -IV normal saline  -AM CMP          Generalized weakness   Assessment & Plan     -Reports generalized weakness X 2 days  -No associated chest pain, SOB, nausea, vomiting, diarrhea  -No fever or chills  -Blood cultures, urine cultures pending  -orthostatic VS  -IV normal saline  -Supportive care          Benign essential hypertension   Assessment & Plan     -Blood pressure is low up arrival to ER  -Hold antihypertensive for now  -IV normal saline  -Monitor blood pressure          Hyperlipidemia   Assessment & Plan     -Continue statins                 VTE Prophylaxis: Pharmacologic VTE Prophylaxis contraindicated due to Hx of acute blood loss anemia, hemoglobin, platelets low  / sequential compression device   Code Status: Level 1 Full code  POLST: POLST form is not discussed and not completed at this time    Discussion with family: None     Anticipated Length of Stay:  Patient will be admitted on an Inpatient basis with an anticipated length of stay of  > 2 midnights  Justification for Hospital Stay: NANETTE, Generalized weakness,hyponatremia, partial small bowel obstruction  Leukocytosis        Admission Orders:  M/S unit  Clear liquid diet  Consult acute care surgery, nephrology  PT/OT/Speech evals  Monitor labs  US kidney & bladder  SCD's  Monitor I&O's  Up & oob as tolerated    Scheduled Meds:   Current Facility-Administered Medications:  acetaminophen 650 mg Oral Q6H PRN Arpit Vickers PA-C    atorvastatin 20 mg Oral Daily With Dinner Arpit Vickers PA-C    doxazosin 4 mg Oral HS Arpit Vickers PA-C    hydrocortisone-pramoxine  Rectal BID Arpit Vickers PA-C    metoprolol tartrate 50 mg Oral Q12H [address] Arpit Vickers PA-C    ondansetron 4 mg Intravenous Q6H PRN Arpit Vickers PA-C    oxybutynin 5 mg Oral Daily Arpit Vickers PA-C    pantoprazole 40 mg Oral BID AC Arpit Vickers PA-C    sodium chloride 100 mL/hr Intravenous Continuous Sophia Duran MD Last Rate: 125 mL/hr (07/09/18 0720)     Continuous Infusions:   sodium chloride 100 mL/hr Last Rate: 125 mL/hr (07/09/18 0720)     PRN Meds:   acetaminophen    ondansetron

## 2018-07-09 NOTE — ASSESSMENT & PLAN NOTE
Likely due to pre renal azotemia  Renal function improving with IV fluids  Continue IV fluids until patient is able to tolerate p o

## 2018-07-09 NOTE — CONSULTS
Consultation - General Surgery   Anuj Fatima 82 y o  female MRN: [id number]  Unit/Bed#: 557-61 Encounter: [id number]    Assessment/Plan     Assessment:  Partial small bowel obstruction  Reducible incisional hernia  Right axillary mass  - c/w old hematoma  Plan:  NO plan for surgical intervention at this time  Can advance diet as tolerated  OOB to chair  As per primary team  U/S of right axilla      History of Present Illness   History, ROS and PFSH unobtainable from any source due to pt is minimally verbal as baseline  She is alert, but refused to answer questions  Eulalia Juárez HPI:  Anuj Fatima is a 82 y o  female who presents with generalize weakness  She had CT scan on 7/8 that demonstrated a PSBO possible originating from a ventral hernia  She denies nausea or vomiting, but family reports decreased appetite  Does not complain of abdominal pain      Consults    Review of Systems   Unable to perform ROS: Patient nonverbal       Historical Information   Past Medical History:   Diagnosis Date    Acute respiratory failure with hypoxia and hypercapnia (HCC)     Arthritis     Asthma     Hypertension     Stroke ([address] )     residual RLE weakness     Past Surgical History:   Procedure Laterality Date    CLOSED REDUCTION HUMERUS FRACTURE Right 11/29/2016    Procedure: CLOSED REDUCTION ARM/HUMERUS  CLOSED VS  OPEN SHOULDER REDUCTION;  Surgeon: Della Ramirez MD;  Location: [facility];  Service:     CLOSED REDUCTION HUMERUS FRACTURE Right 4/13/2018    Procedure: CLOSED REDUCTION ARM/HUMERUS;  Surgeon: Della Ramirez MD;  Location: [facility];  Service: Orthopedics    EYE SURGERY      bilateral CAT EXT W/IOLs    RECTAL PROLAPSE REPAIR      done 5-6 yrs ago     Social History   History   Alcohol Use No     History   Drug Use No     History   Smoking Status    Never Smoker   Smokeless Tobacco    Never Used     Family History: non-contributory    Meds/Allergies   all current active meds have been reviewed  No Known Allergies    Objective   First Vitals:   Blood Pressure: 97/71 (07/08/18 1241)  Pulse: 80 (07/08/18 1241)  Temperature: 98 9 °F (37 2 °C) (07/08/18 1241)  Temp Source: Temporal (07/08/18 1241)  Respirations: 18 (07/08/18 1241)  Height: 4' 2" (127 cm) (07/08/18 1241)  Weight - Scale: 48 8 kg (107 lb 8 oz) (07/08/18 1241)  SpO2: 100 % (07/08/18 1241)    Current Vitals:   Blood Pressure: 119/56 (07/09/18 0749)  Pulse: 87 (07/09/18 0749)  Temperature: 99 9 °F (37 7 °C) (07/09/18 0749)  Temp Source: Temporal (07/09/18 0749)  Respirations: 18 (07/09/18 0749)  Height: 4' 2" (127 cm) (07/08/18 2217)  Weight - Scale: 49 6 kg (109 lb 5 6 oz) (07/09/18 0600)  SpO2: 96 % (07/09/18 0749)      Intake/Output Summary (Last 24 hours) at 07/09/18 0830  Last data filed at 07/09/18 0720   Gross per 24 hour   Intake          1414 58 ml   Output                0 ml   Net          1414 58 ml       Invasive Devices     Peripheral Intravenous Line            Peripheral IV 07/08/18 Right Hand less than 1 day                Physical Exam   Constitutional: No distress  HENT:   Head: Normocephalic and atraumatic  Right Ear: External ear normal    Left Ear: External ear normal    Mouth/Throat: Oropharynx is clear and moist    Eyes: EOM are normal  No scleral icterus  Neck: Neck supple  No JVD present  No tracheal deviation present  Cardiovascular: Normal rate  No murmur heard  Pulmonary/Chest: Effort normal  No respiratory distress  She has no wheezes  Abdominal: Soft  There is no tenderness  Mild tenderness in lower abdomen with deep palpation  Reducible hernia to the right of lower midline incision   Musculoskeletal: Normal range of motion  She exhibits tenderness  Large, tense mass in Right axilla  Minimally tender  No erythema of overlying skin  Neurological: She is alert  No cranial nerve deficit  Skin: Skin is warm  No rash noted  She is not diaphoretic     Psychiatric:   Pt non verbal       Lab Results:   CMP:   Lab Results   Component Value Date     07/09/2018    K 4 1 07/09/2018     07/09/2018    CO2 23 07/09/2018    ANIONGAP 9 07/09/2018    BUN 45 (H) 07/09/2018    CREATININE 1 96 (H) 07/09/2018    GLUCOSE 120 07/09/2018    CALCIUM 8 0 (L) 07/09/2018    AST 19 07/09/2018    ALT 15 07/09/2018    ALKPHOS 68 07/09/2018    PROT 5 8 (L) 07/09/2018    BILITOT 0 60 07/09/2018    EGFR 23 07/09/2018     Imaging: I have personally reviewed pertinent reports  EKG, Pathology, and Other Studies: I have personally reviewed pertinent reports  and I have personally reviewed pertinent films in PACS    Counseling / Coordination of Care  Total floor / unit time spent today 45 minutes  Greater than 50% of total time was spent with the patient and / or family counseling and / or coordination of care    A description of the counseling / coordination of care: Discussed surgical plan of care with primary team

## 2018-07-09 NOTE — ASSESSMENT & PLAN NOTE
-Creatinine at 2 7  -No documented Hx of chronic kidney disease  -Decreased urinary output, probably secondary to dehydration  -Admit to med/surg  -IV normal saline  -Hold Cozaar for now  -[facility]  -Nephrology Consult

## 2018-07-09 NOTE — ASSESSMENT & PLAN NOTE
-WBC at 17 51  -No fever, chills reported  -No clear source of infection  -Will monitor of antibiotics   -Blood cultures pending  -Urine cultures pending  -IV normal saline  -Monitor CBC

## 2018-07-09 NOTE — ED PROVIDER NOTES
History  Chief Complaint   Patient presents with    Weakness - Generalized     Generalized weakness since yesterday     Patient: Samira Mendes  82 y o /female  YOB: 1935  MRN: [id number]  PCP: Lis Jimenez DO  Date of evaluation: 7/8/2018    (N B  Voice-recognition software may have been used in the preparation of this document )    CC: weakness  The majority of the history comes from a family member at the bedside  She herself speaks very little, but she is alert, oriented, and cooperative  for 1-2 days she has been generally weak  The weakness is gradually worsening  She can't walk by herself  She has no appetite  History provided by:  Relative and patient  Fatigue   Severity:  Severe  Onset quality:  Gradual  Timing:  Constant  Progression:  Worsening  Chronicity:  New  Context: dehydration    Context: not change in medication  Recent infection: unknown  Relieved by:  Nothing  Worsened by:  Nothing  Associated symptoms: difficulty walking    Associated symptoms: no abdominal pain, no chest pain, no cough, no diarrhea, no dizziness, no dysuria, no falls, no fever, no frequency, no loss of consciousness, no nausea, no shortness of breath, no vision change and no vomiting        Prior to Admission Medications   Prescriptions Last Dose Informant Patient Reported?  Taking?   atorvastatin (LIPITOR) 20 mg tablet   No Yes   Sig: Take 1 tablet by mouth daily with dinner for 30 days   doxazosin (CARDURA) 4 mg tablet   Yes Yes   Sig: Take 4 mg by mouth daily at bedtime   hydrocortisone-pramoxine (PROCTOFOAM-HC) rectal foam   No Yes   Sig: Insert 1 applicator into the rectum 2 (two) times a day   losartan-hydrochlorothiazide (HYZAAR) 100-12 5 MG per tablet   Yes Yes   Sig: Take 1 tablet by mouth daily   metoprolol tartrate (LOPRESSOR) 50 mg tablet   No Yes   Sig: Take 1 tablet (50 mg total) by mouth every 12 (twelve) hours   oxybutynin (DITROPAN) 5 mg tablet   Yes Yes   Sig: Take 5 mg by mouth daily     pantoprazole (PROTONIX) 40 mg tablet   No Yes   Sig: Take 1 tablet (40 mg total) by mouth 2 (two) times a day before meals      Facility-Administered Medications: None       Past Medical History:   Diagnosis Date    Acute respiratory failure with hypoxia and hypercapnia (HCC)     Arthritis     Asthma     Hypertension     Stroke ([address] )     residual RLE weakness       Past Surgical History:   Procedure Laterality Date    CLOSED REDUCTION HUMERUS FRACTURE Right 11/29/2016    Procedure: CLOSED REDUCTION ARM/HUMERUS  CLOSED VS  OPEN SHOULDER REDUCTION;  Surgeon: Adolph Copeland MD;  [facility]: [facility];  Service:     CLOSED REDUCTION HUMERUS FRACTURE Right 4/13/2018    Procedure: CLOSED REDUCTION ARM/HUMERUS;  Surgeon: Adolph Copeland MD;  Location: [facility];  Service: Orthopedics    EYE SURGERY      bilateral CAT EXT W/IOLs    RECTAL PROLAPSE REPAIR      done 5-6 yrs ago       Family History   Problem Relation Age of Onset    Hypertension Son      I have reviewed and agree with the history as documented  Social History   Substance Use Topics    Smoking status: Never Smoker    Smokeless tobacco: Never Used    Alcohol use No        Review of Systems   Constitutional: Positive for fatigue  Negative for chills and fever  HENT: Negative for hearing loss, trouble swallowing and voice change  Eyes: Negative for pain, redness and visual disturbance  Respiratory: Negative for cough and shortness of breath  Cardiovascular: Negative for chest pain and palpitations  Gastrointestinal: Negative for abdominal pain, constipation, diarrhea, nausea and vomiting  Genitourinary: Negative for dysuria, frequency, hematuria, vaginal bleeding and vaginal discharge  Musculoskeletal: Negative for back pain, falls, gait problem and neck pain  Skin: Negative for color change and rash  Neurological: Positive for weakness   Negative for dizziness, loss of consciousness, speech difficulty and light-headedness  Psychiatric/Behavioral: Negative for confusion and decreased concentration  The patient is not nervous/anxious  All other systems reviewed and are negative  Physical Exam  Physical Exam   Constitutional: She is oriented to person, place, and time  She appears well-developed and well-nourished  HENT:   Head: Normocephalic and atraumatic  Mouth/Throat: Mucous membranes are dry  Eyes: EOM are normal  Pupils are equal, round, and reactive to light  Cardiovascular: Normal rate and regular rhythm  Pulmonary/Chest: Effort normal and breath sounds normal    Abdominal: Soft  Normal appearance  There is no rigidity, no rebound and no guarding  Neurological: She is alert and oriented to person, place, and time  Skin: Skin is warm and dry  Psychiatric: She has a normal mood and affect  Her speech is normal and behavior is normal  She is inattentive  Vital Signs  ED Triage Vitals [07/08/18 1241]   Temperature Pulse Respirations Blood Pressure SpO2   98 9 °F (37 2 °C) 80 18 97/71 100 %      Temp Source Heart Rate Source Patient Position - Orthostatic VS BP Location FiO2 (%)   Temporal Monitor Lying Left arm --      Pain Score       No Pain           Vitals:    07/08/18 1700 07/08/18 1800 07/08/18 1900 07/08/18 2100   BP: (!) 86/41 (!) 81/45 (!) 82/43 (!) 94/45   Pulse: 71 72 71 74   Patient Position - Orthostatic VS: Lying Lying Lying Lying       Visual Acuity      ED Medications  Medications   sodium chloride 0 9 % infusion (125 mL/hr Intravenous New Bag 7/8/18 2001)   sodium chloride 0 9 % bolus 500 mL (0 mL Intravenous Stopped 7/8/18 1602)   sodium chloride 0 9 % bolus 1,000 mL (0 mL Intravenous Stopped 7/8/18 2001)       Diagnostic Studies  Results Reviewed     Procedure Component Value Units Date/Time    [place] draw [[id number]] Collected:  07/08/18 1306    Lab Status:  Final result Specimen:  Blood Updated:  07/08/18 1501    Narrative:        The following orders were created for panel order [place] draw  Procedure                               Abnormality         Status                     ---------                               -----------         ------                     Rochelle Cocker Top on QJPR[[id number]]                            Final result               Gold top on FNXZ[[id number]]                                  Final result               Lavender Top 7ml on GVOW[[id number]]                          Final result                 Please view results for these tests on the individual orders  Protime-INR [[id number]]  (Normal) Collected:  07/08/18 1306    Lab Status:  Final result Specimen:  Blood from Arm, Left Updated:  07/08/18 1443     Protime 14 2 seconds      INR 1 16    APTT [[id number]]  (Abnormal) Collected:  07/08/18 1306    Lab Status:  Final result Specimen:  Blood from Arm, Left Updated:  07/08/18 1443     PTT 45 (H) seconds     UA w Reflex to Microscopic w Reflex to Culture [[id number]]     Lab Status:  No result Specimen:  Urine     CBC and differential [[id number]]  (Abnormal) Collected:  07/08/18 1306    Lab Status:  Final result Specimen:  Blood from Arm, Left Updated:  07/08/18 1332     WBC 17 51 (H) Thousand/uL      RBC 3 04 (L) Million/uL      Hemoglobin 9 1 (L) g/dL      Hematocrit 28 1 (L) %      MCV 92 fL      MCH 29 9 pg      MCHC 32 4 g/dL      RDW 16 7 (H) %      MPV 11 0 fL      Platelets 457 (L) Thousands/uL     Narrative: This is an appended report  These results have been appended to a previously verified report      Troponin I [[id number]]  (Normal) Collected:  07/08/18 1306    Lab Status:  Final result Specimen:  Blood from Arm, Left Updated:  07/08/18 1329     Troponin I <0 02 ng/mL     Comprehensive metabolic panel [[id number]]  (Abnormal) Collected:  07/08/18 1306    Lab Status:  Final result Specimen:  Blood from Arm, Left Updated:  07/08/18 1327     Sodium 133 (L) mmol/L      Potassium 4 6 mmol/L      Chloride 99 (L) mmol/L      CO2 24 mmol/L Anion Gap 10 mmol/L      BUN 43 (H) mg/dL      Creatinine 2 47 (H) mg/dL      Glucose 232 (H) mg/dL      Calcium 8 7 mg/dL      AST 19 U/L      ALT 18 U/L      Alkaline Phosphatase 79 U/L      Total Protein 7 0 g/dL      Albumin 2 8 (L) g/dL      Total Bilirubin 0 90 mg/dL      eGFR 18 ml/min/1 73sq m     Narrative:         National Kidney Disease Education Program recommendations are as follows:  GFR calculation is accurate only with a steady state creatinine  Chronic Kidney disease less than 60 ml/min/1 73 sq  meters  Kidney failure less than 15 ml/min/1 73 sq  meters  CT chest abdomen pelvis wo contrast   Final Result by Prabhjot Amaral MD (07/08 1538)      Findings concerning for early or partial small bowel obstruction related to a herniated small bowel loop within a right lower quadrant ventral hernia  5 4 cm fluid collection in the right axilla, new since the prior study  This may represent a seroma or resolving hematoma  Multiple chronic right rib and pelvic fractures  Chronic right shoulder dislocation also again identified  I personally discussed this study with Kodak Yuen on 7/8/2018 at 3:37 PM                   Workstation performed: [id number]                    Procedures  Procedures       Phone Contacts  ED Phone Contact    ED Course  ED Course as of Jul 08 2152   Erna Rice Jul 08, 2018   1427 Second RN trying access    [id number] Right lower abd hernia spontaneously reduced with placement in Trendelenburg  Continuing IV fluids  Attempting to get urine  1653 NANETTE Creatinine: (!) 2 47   1654 re: intra-abd, pending urine for UA  WBC: (!) 17 51   1654 Calling Surgery to discuss  82 Red Surgery at [facility] - Given lack of inflammation and fact that hernia is reduced, no abx                                  MDM  CritCare Time    Disposition  Final diagnoses:   NANETTE (acute kidney injury) ([facility] [address] )   Generalized weakness     Time reflects when diagnosis was documented in both MDM as applicable and the Disposition within this note     Time User Action Codes Description Comment    7/8/2018  9:16 PM Adry CHANDLER Add [N17 9] NANETTE (acute kidney injury) ([facility] [address] )     7/8/2018  9:16 PM Adry CHANDLER Add [R53 1] Generalized weakness       ED Disposition     ED Disposition Condition Comment    Admit  Case was discussed with Michaelle Ward PA-C for [facility]   and the patient's admission status was agreed to be Admission Status: inpatient status to the service of Dr Ivon Stoll   Follow-up Information    None         Patient's Medications   Discharge Prescriptions    No medications on file     No discharge procedures on file      ED Provider  Electronically Signed by           George Barba MD  07/11/18 4916

## 2018-07-09 NOTE — ASSESSMENT & PLAN NOTE
-Reports generalized weakness X 2 days  -No associated chest pain, SOB, nausea, vomiting, diarrhea  -No fever or chills  -Blood cultures, urine cultures pending  -orthostatic VS  -IV normal saline  -Supportive care

## 2018-07-09 NOTE — ED PROCEDURE NOTE
PROCEDURE  ECG 12 Lead Documentation  Date/Time: 7/8/2018 1:35 PM  Performed by: Speedy Jones by: Dipak Nicole     Indications / Diagnosis:  Weakness  ECG reviewed by me, the ED Provider: yes    Patient location:  ED  Previous ECG:     Comparison to cardiac monitor: Yes    Interpretation:     Interpretation: normal    Rate:     ECG rate:  79    ECG rate assessment: normal    Rhythm:     Rhythm: sinus rhythm    Ectopy:     Ectopy: none    QRS:     QRS axis:  Normal    QRS intervals:  Normal  Conduction:     Conduction: normal    ST segments:     ST segments:  Normal  T waves:     T waves: normal           Koby Greenberg MD  07/08/18 2154

## 2018-07-09 NOTE — ASSESSMENT & PLAN NOTE
-CT abdomen and pelvis shows- Findings concerning for early or partial small bowel obstruction related to a herniated small bowel loop within a right lower quadrant ventral hernia   -No reports of abdominal pain, nausea, vomiting  -Hernia was reduced in ER  -Case discussed with surgery at [facility]  -Recommends no antibiotic   -pain management PRN  -NPO for now, will advance in AM  -Repeat imaging study and  with new onset of abdominal pain, nausea vomiting,   -surgery consult  -Supportive care

## 2018-07-09 NOTE — PROGRESS NOTES
Progress Note - Ligia Devine 1935, 82 y o  female MRN: [id number]    Unit/Bed#: 529-44 Encounter: [id number]    Primary Care Provider: Lolly Gallegos DO   Date and time admitted to hospital: 7/8/2018 12:38 PM        Partial small bowel obstruction ([address] )   Assessment & Plan    -case discussed with surgery  Liquids today, IV fluids, supportive care  Advance diet as tolerated        Mass of right axilla   Assessment & Plan    Surgery following  ? Hematoma from shoulder realignment in previous admission  Will obtain ultrasound        * Acute kidney injury [facility])   Assessment & Plan    Likely due to pre renal azotemia  Renal function improving with IV fluids  Continue IV fluids until patient is able to tolerate p o  Benign essential hypertension   Assessment & Plan    Continue to hold antihypertensive regimen        Hyponatremia   Assessment & Plan    Resolved            Progress Note - Ligia Devine 82 y o  female MRN: [id number]    Unit/Bed#: 417-01 Encounter: [id number]        Subjective:   Seen and examined at bedside, s she is without abdominal pain or nausea  She wants to try liquids today  Objective:     Vitals:   Vitals:    07/09/18 0749   BP: 119/56   Pulse: 87   Resp: 18   Temp: 99 9 °F (37 7 °C)   SpO2: 96%     Body mass index is 30 75 kg/m²      Intake/Output Summary (Last 24 hours) at 07/09/18 1006  Last data filed at 07/09/18 0842   Gross per 24 hour   Intake          1414 58 ml   Output                0 ml   Net          1414 58 ml       Physical Exam:   /56 (BP Location: Left arm)   Pulse 87   Temp 99 9 °F (37 7 °C) (Temporal)   Resp 18   Ht 4' 2" (1 27 m)   Wt 49 6 kg (109 lb 5 6 oz)   SpO2 96%   BMI 30 75 kg/m²   General appearance: alert and oriented, in no acute distress  Head: Normocephalic, without obvious abnormality, atraumatic  Lungs: clear to auscultation bilaterally  Heart: regular rate and rhythm, S1, S2 normal, no murmur, click, rub or gallop  Abdomen: soft, non-tender; bowel sounds normal; no masses,  no organomegaly  Extremities: extremities normal, warm and well-perfused; no cyanosis, clubbing, or edema   Right axilla there is a mildly firm 5-6 cm mass that is nonfluctuant, not painful and mobile  Pulses: 2+ and symmetric  Neurologic: Grossly normal     Invasive Devices     Peripheral Intravenous Line            Peripheral IV 07/08/18 Right Hand less than 1 day                  Results from last 7 days  Lab Units 07/09/18  0549 07/08/18  1306   WBC Thousand/uL 13 17* 17 51*   HEMOGLOBIN g/dL 8 3* 9 1*   HEMATOCRIT % 25 3* 28 1*   PLATELETS Thousands/uL 109* 129*         Results from last 7 days  Lab Units 07/09/18  0449 07/08/18  1306   SODIUM mmol/L 137 133*   POTASSIUM mmol/L 4 1 4 6   CHLORIDE mmol/L 105 99*   CO2 mmol/L 23 24   BUN mg/dL 45* 43*   CREATININE mg/dL 1 96* 2 47*   CALCIUM mg/dL 8 0* 8 7   TOTAL PROTEIN g/dL 5 8* 7 0   BILIRUBIN TOTAL mg/dL 0 60 0 90   ALK PHOS U/L 68 79   ALT U/L 15 18   AST U/L 19 19   GLUCOSE RANDOM mg/dL 120 232*       Medication Administration - last 24 hours from 07/08/2018 1006 to 07/09/2018 1006       Date/Time Order Dose Route Action Action by     07/08/2018 1602 sodium chloride 0 9 % bolus 500 mL 0 mL Intravenous Stopped Niurka Pennington, RN     07/08/2018 1429 sodium chloride 0 9 % bolus 500 mL 500 mL Intravenous New Bag Corby, KEKE     07/08/2018 2001 sodium chloride 0 9 % bolus 1,000 mL 0 mL Intravenous Stopped Niurka Pennington RN     07/08/2018 1621 sodium chloride 0 9 % bolus 1,000 mL 1,000 mL Intravenous [address] Niurka Pennington RN     07/09/2018 0720 sodium chloride 0 9 % infusion 125 mL/hr Intravenous New Bag Isabelle Simms RN     07/08/2018 2001 sodium chloride 0 9 % infusion 125 mL/hr Intravenous [address] Niurka Pennington RN     07/08/2018 2243 doxazosin (CARDURA) tablet 4 mg 4 mg Oral Not Given Catina Estimable, RN     07/09/2018 5450 metoprolol tartrate (LOPRESSOR) tablet 50 mg 50 mg Oral Given Junior Pritchett, RN     07/08/2018 2248 metoprolol tartrate (LOPRESSOR) tablet 50 mg 50 mg Oral Not Given Mandeep Mchugh RN     07/09/2018 7268 oxybutynin (DITROPAN) tablet 5 mg 5 mg Oral Given Isabelle Simms, RN     07/09/2018 0535 pantoprazole (PROTONIX) EC tablet 40 mg 40 mg Oral Not Given Vickie Lees RN     07/08/2018 2229 sodium chloride 0 9 % infusion 100 mL/hr Intravenous Not Given Mandeep Mchugh RN            Lab, Imaging and other studies: I have personally reviewed pertinent reports      VTE Pharmacologic Prophylaxis:  Will hold off on anticoagulation due to history of recent GI bleed  VTE Mechanical Prophylaxis: sequential compression device     Debra Aguiar MD  7/9/2018,10:06 AM

## 2018-07-09 NOTE — PHYSICIAN ADVISOR
Current patient class: Inpatient  The patient is currently on Hospital Day: 2 at [address]      The patient was admitted to the hospital at 2116 on 7/8/18 for the following diagnosis:  Altered mental status [R41 82]  NANETTE (acute kidney injury) ([facility] [address] ) [N17 9]  Generalized weakness [R53 1]       There is documentation in the medical record of an expected length of stay of at least 2 midnights  The patient is therefore expected to satisfy the 2 midnight benchmark and given the 2 midnight presumption is appropriate for INPATIENT ADMISSION  Given this expectation of a satisfying stay, CMS instructs us that the patient is most often appropriate for inpatient admission under part A provided medical necessity is documented in the chart  After review of the relevant documentation, labs, vital signs and test results, the patient is appropriate for INPATIENT ADMISSION  Admission to the hospital as an inpatient is a complex decision making process which requires the practitioner to consider the patients presenting complaint, history and physical examination and all relevant testing  With this in mind, in this case, the patient was deemed appropriate for INPATIENT ADMISSION  After review of the documentation and testing available at the time of the admission I concur with this clinical determination of medical necessity  Rationale is as follows: The patient is an 82-year-old female who presented to the hospital on July 8, 2018 and had CT findings concerning for early or partial small bowel obstruction related to a herniated small bowel loop within the right lower quadrant ventral hernia  Surgical consult was requested and the patient was made NPO  she also had acute kidney injury with creatinine 2 7 and no documented history of chronic kidney disease  The patient presented with generalized weakness, poor appetite, and dizziness      She is expected to remain hospitalized beyond two midnights  The acute kidney injury is attributed to diminished oral intake and use of her losartan HC T  Baseline creatinine is 0 94  The surgical service agreed that the patient had a partial small-bowel obstruction with reducible incisional hernia  She will not require surgical intervention at this time but her diet will be slowly advanced as tolerated and she will remain on intravenous fluids  Inpatient level care is appropriate      The patients vitals on arrival were ED Triage Vitals [07/08/18 1241]   Temperature Pulse Respirations Blood Pressure SpO2   98 9 °F (37 2 °C) 80 18 97/71 100 %      Temp Source Heart Rate Source Patient Position - Orthostatic VS BP Location FiO2 (%)   Temporal Monitor Lying Left arm --      Pain Score       No Pain           Past Medical History:   Diagnosis Date    Acute respiratory failure with hypoxia and hypercapnia (HCC)     Arthritis     Asthma     Hypertension     Stroke ([facility] [address] )     residual RLE weakness     Past Surgical History:   Procedure Laterality Date    CLOSED REDUCTION HUMERUS FRACTURE Right 11/29/2016    Procedure: CLOSED REDUCTION ARM/HUMERUS  CLOSED VS  OPEN SHOULDER REDUCTION;  Surgeon: Bharati Lee MD;  Location: [facility];  Service:     CLOSED REDUCTION HUMERUS FRACTURE Right 4/13/2018    Procedure: CLOSED REDUCTION ARM/HUMERUS;  Surgeon: Bharati Lee MD;  Location: [facility];  Service: Orthopedics    EYE SURGERY      bilateral CAT EXT W/IOLs    RECTAL PROLAPSE REPAIR      done 5-6 yrs ago           Consults have been placed to:   IP CONSULT TO NEPHROLOGY  IP CONSULT TO ACUTE CARE SURGERY    Vitals:    07/08/18 2217 07/09/18 0000 07/09/18 0600 07/09/18 0749   BP: (!) 95/49 117/56  119/56   BP Location: Left arm Left arm  Left arm   Pulse: 70 82  87   Resp: 19 20  18   Temp: 97 8 °F (36 6 °C) 97 6 °F (36 4 °C)  99 9 °F (37 7 °C)   TempSrc: Temporal Temporal  Temporal   SpO2: 97%   96%   Weight: 49 7 kg (109 lb 9 1 oz)  49 6 kg (109 lb 5 6 oz) Height: 4' 2" (1 27 m)          Most recent labs:    Recent Labs      07/08/18   1306  07/09/18   0449  07/09/18   0549   WBC  17 51*   --   13 17*   HGB  9 1*   --   8 3*   HCT  28 1*   --   25 3*   PLT  129*   --   109*   K  4 6  4 1   --    NA  133*  137   --    CALCIUM  8 7  8 0*   --    BUN  43*  45*   --    CREATININE  2 47*  1 96*   --    INR  1 16  1 11   --    TROPONINI  <0 02   --    --    AST  19  19   --    ALT  18  15   --    ALKPHOS  79  68   --    BILITOT  0 90  0 60   --        Scheduled Meds:  Current Facility-Administered Medications:  acetaminophen 650 mg Oral Q6H PRN Arpit Vickers PA-C    atorvastatin 20 mg Oral Daily With Dinner Arpit Vickers PA-C    doxazosin 4 mg Oral HS Arpit Vickers PA-C    hydrocortisone-pramoxine  Rectal BID Arpit Vickers PA-C    metoprolol tartrate 50 mg Oral Q12H [address] Arpit Vickers PA-C    ondansetron 4 mg Intravenous Q6H PRN Arpit Vickers PA-C    oxybutynin 5 mg Oral Daily Arpit Vickers PA-C    pantoprazole 40 mg Oral BID AC Arpit Vickers PA-C    sodium chloride 100 mL/hr Intravenous Continuous Sophia Duran MD Last Rate: 125 mL/hr (07/09/18 0720)     Continuous Infusions:  sodium chloride 100 mL/hr Last Rate: 125 mL/hr (07/09/18 0720)     PRN Meds:   acetaminophen    ondansetron    Surgical procedures (if appropriate):

## 2018-07-09 NOTE — SPEECH THERAPY NOTE
Speech Language/Pathology  Speech/Language Pathology  Assessment    Patient Name: Samantha Barnhart  UWUIJ'S Date: 7/9/2018     Problem List  Patient Active Problem List   Diagnosis    Dislocation of shoulder region, right, initial encounter    Acute respiratory failure with hypoxia and hypercapnia (HCC)    Benign essential hypertension    GI bleed    Hyperlipidemia    Atrial fibrillation (HCC)    Anemia    Acute blood loss anemia    Vaginal prolapse    Elevated troponin    Hematochezia    Urinary retention    Rectal prolapse    Generalized weakness    Acute kidney injury ([address] )    Hyponatremia    Partial small bowel obstruction (HCC)    Hyperglycemia    Leukocytosis    Mass of right axilla     Past Medical History  Past Medical History:   Diagnosis Date    Acute respiratory failure with hypoxia and hypercapnia (HCC)     Arthritis     Asthma     Hypertension     Stroke ([address] )     residual RLE weakness     Past Surgical History  Past Surgical History:   Procedure Laterality Date    CLOSED REDUCTION HUMERUS FRACTURE Right 11/29/2016    Procedure: CLOSED REDUCTION ARM/HUMERUS  CLOSED VS  OPEN SHOULDER REDUCTION;  Surgeon: Stephanie Gould MD;  [facility];  Service:     CLOSED REDUCTION HUMERUS FRACTURE Right 4/13/2018    Procedure: CLOSED REDUCTION ARM/HUMERUS;  Surgeon: Stephanie Gould MD;  [facility];  Service: Orthopedics    EYE SURGERY      bilateral CAT EXT W/IOLs    RECTAL PROLAPSE REPAIR      done 5-6 yrs ago        07/09/18 1500   Swallow Information   Current Risks for Dysphagia & Aspiration Mental status change;General debilitation   Current Symptoms/Concerns Decreased oral intake   Current Diet Clear liquid; Thin liquid   Baseline Diet Regular; Thin liquids   Baseline Assessment   Behavior/Cognition Alert; Cooperative; Interactive   Speech/Language Status WFL   Patient Positioning Upright in bed   Swallow Mechanism Exam   Dentition Adequate   Volitional Cough Strong Consistencies Assessed and Performance   Materials Admnistered Thin liquid   Materials Adminstered Comment Patient currently only on clear liquid diet level for possible testing small bowel  Oral Stage WFL   Phargngeal Stage WFL   Swallow Mechanics Swallow initation; Appears prompt;Good Larygneal rise   Esophageal Concerns (pt has had abdominal pain/rectal bleeding in the past)   Summary   Swallow Summary Patient is seen for dysphagia evaluation to assess oral and pharyngeal stage of swallow function  The patient is currently on clear liquid diet level for possible testing  She presents pleasant and alert  Her oral [facility] exam is [facility] with adequate natural dentition  The patient is without oropharyngeal dysphagia with thin liquids  Nursing reports no s/s  Patient never seen by Speech Dept in the past  Patient reports no problems chewing or swallowing  Patient's functional status adequate for transition when medically appropriate to regular diet level and thin liquids  Recommendations   Risk for Aspiration None   Recommendations Consider oral diet   Diet Solid Recommendation Regular consistency   Diet Liquid Recommendation Thin liquid   Recommended Form of Meds As desired; As tolerated   General Precautions Feed only when alert;Upright as possible for all oral intake   Further Evaluations Gastroenterology; Dietician   Results Reviewed with RN;PT/Family/Caregiver

## 2018-07-09 NOTE — SOCIAL WORK
Cm met with the patient to evaluate the patients prior function and living situation and any barriers to d/c and form a safe d/c plan  Cm also evaluated the patient for any services in the home or needs for services  Pt resides at home with her son in a house  Has 2-3 JOHN then all on one floor  Pt ambulates using her walker  Pt had been for the most part independent with her adls, son assists as needed  Re: Services pt is active with [facility]  Hx of SNF/STR at [facility] (was there fropm 4/16/18-5/3/18)  Pt's son does the driving  PCP is Kenya Lara and pharmacy is [facility]  Plans at this time are home on dc with resumption of homecare services ([facility])  CM will continue to follow and assist in dc planning

## 2018-07-09 NOTE — PHYSICAL THERAPY NOTE
PHYSICAL THERAPY NOTE          Patient Name: Jovany Jamison  DHOLI'V Date: 7/9/2018 07/09/18 1501   Note Type   Note type Eval only   Pain Assessment   Pain Assessment 0-10   Pain Score 5   Pain Location Rib cage   Pain Orientation Right   Home Living   Type of [address] One level   [facility]   Prior Function   Level of [place] Needs assistance with IADLs   Lives With Family; Son   Frantz [facility] From [facility] in the last 6 months 0   Vocational Retired   Restrictions/Precautions   [facility] Bearing Precautions Per Order No   Other Precautions Bed Alarm; Chair Alarm; Fall Risk;Telemetry;Multiple lines   Cognition   Overall Cognitive Status WFL   Arousal/Participation Alert   Orientation Level Oriented X4   Following Commands Follows all commands and directions without difficulty   RLE Assessment   RLE Assessment X   Strength RLE   RLE Overall Strength 4/5   LLE Assessment   LLE Assessment X   Strength LLE   LLE Overall Strength 4/5   Bed Mobility   Supine to Sit 4  Minimal assistance   Additional items Assist x 1;Bedrails;HOB elevated;LE management;Verbal cues   Transfers   Sit to Stand 4  Minimal assistance   Additional items Assist x 1;Bedrails;Verbal cues; Increased time required   Stand to Sit 5  Supervision   Additional items Armrests; Increased time required   Ambulation/Elevation   Gait pattern Foward flexed; Short stride  (decreased gait speed)   Gait Assistance 4  Minimal assist   Additional items Assist x 1   Assistive Device Rolling walker   Distance 60 ft   Stair Management Assistance Not tested   Balance   Static Sitting Fair +   Static Standing Fair   Ambulatory Fair -   Endurance Deficit   Endurance Deficit Yes   Activity Tolerance   Activity Tolerance Patient limited by fatigue   Nurse Made Aware patient OOB in chair   Assessment   Prognosis Good Problem List Decreased strength;Decreased endurance; Impaired balance;Decreased mobility;Pain   Assessment Aimee Shore is a 82year old woman presenting to PT with decreased strength and independence with bed mobility and transfers  She reports generalized fatigue, related to illness and extended time in bed  She will benefit from continued PT to address her deficits  Barriers to Discharge None   Goals   Patient Goals To go home   LTG Expiration Date 07/23/18   Long Term Goal #1 Increase BLE strength 1/2 grade; Ambulate >150 ft with supervision and RW for support; Demonstrate Sandee with all bed mobility and transfers  Plan   Treatment/Interventions Functional transfer training;LE strengthening/ROM; Therapeutic exercise; Endurance training;Bed mobility;Gait training;Spoke to nursing   PT Frequency (3-5x/wk)   Recommendation   Recommendation Home with family support;Home PT   PT - OK to Discharge Yes

## 2018-07-09 NOTE — H&P
[facility] Internal Medicine  H&P- Kit Mary 1935, 82 y o  female MRN: [id number]    Unit/Bed#: 417-01 Encounter: [id number]    Primary Care Provider: Namrata Shelton DO   Date and time admitted to hospital: 7/8/2018 12:38 PM        Partial small bowel obstruction (HCC)   Assessment & Plan    -CT abdomen and pelvis shows- Findings concerning for early or partial small bowel obstruction related to a herniated small bowel loop within a right lower quadrant ventral hernia   -No reports of abdominal pain, nausea, vomiting  -Hernia was reduced in ER  -Case discussed with surgery at [facility]  -Recommends no antibiotic   -pain management PRN  -NPO for now, will advance in AM  -Repeat imaging study and  with new onset of abdominal pain, nausea vomiting,   -surgery consult  -Supportive care        * Acute kidney injury ([facility] [address] )   Assessment & Plan    -Creatinine at 2 7  -No documented Hx of chronic kidney disease  -Decreased urinary output, probably secondary to dehydration  -Admit to med/surg  -IV normal saline  -Hold Cozaar for now  -AM CMP  -Nephrology Consult        Leukocytosis   Assessment & Plan    -WBC at 17 51  -No fever, chills reported  -No clear source of infection  -Will monitor of antibiotics   -Blood cultures pending  -Urine cultures pending  -IV normal saline  -Monitor CBC        Hyperglycemia   Assessment & Plan    -Glucose at 232  -no hx of diabetes  -Will check A1C  -AM CMP        Hyponatremia   Assessment & Plan    -Sodium at 133  -IV normal saline  -AM CMP        Generalized weakness   Assessment & Plan    -Reports generalized weakness X 2 days  -No associated chest pain, SOB, nausea, vomiting, diarrhea  -No fever or chills  -Blood cultures, urine cultures pending  -orthostatic VS  -IV normal saline  -Supportive care        Benign essential hypertension   Assessment & Plan    -Blood pressure is low up arrival to ER  -Hold antihypertensive for now  -IV normal saline  -Monitor blood pressure Hyperlipidemia   Assessment & Plan    -Continue statins                        VTE Prophylaxis: Pharmacologic VTE Prophylaxis contraindicated due to Hx of acute blood loss anemia, hemoglobin, platelets low  / sequential compression device   Code Status: Level 1 Full code  POLST: POLST form is not discussed and not completed at this time  Discussion with family: None    Anticipated Length of Stay:  Patient will be admitted on an Inpatient basis with an anticipated length of stay of  > 2 midnights  Justification for Hospital Stay: NANETTE, Generalized weakness,hyponatremia, partial small bowel obstruction  Leukocytosis  Total Time for Visit, including Counseling / Coordination of Care: 30 minutes  Greater than 50% of this total time spent on direct patient counseling and coordination of care  Chief Complaint:   Weakness    History of Present Illness:    Patrice lOvera is a 82 y o  female who presents with to the ER complaining of generalized weakness over the past two day  Patient states that she is also having episodes of dizziness and the weakness seem to be getting worst  She states that she is unable to walk without support and has not been eating well  She denies, chest pain, SOB, N/V/D, fever, chills, Abdominal pain  She states that she has been having decreased urine output  She does have hx of urinary retention  Labs completed in the ER with results as shown below  CT chest abdomen and pelvis with results as shown below  Patient is being admitted on inpatient status med surg level care for further workup and management of NANETTE, Generalized weakness, Hyponatremia, Leukocytosis, partial small bowel obstruction,    Review of Systems:    Review of Systems   Constitutional: Positive for activity change, appetite change and fatigue  Negative for chills and fever  HENT: Negative for congestion, sinus pain, trouble swallowing and voice change      Eyes: Negative for photophobia, discharge and visual disturbance  Respiratory: Negative for cough, chest tightness, shortness of breath and wheezing  Cardiovascular: Negative for chest pain, palpitations and leg swelling  Gastrointestinal: Positive for abdominal distention  Negative for abdominal pain, diarrhea, nausea and vomiting  Genitourinary: Positive for decreased urine volume  Negative for difficulty urinating, dysuria, flank pain, frequency and hematuria  Musculoskeletal: Negative for joint swelling and neck pain  Left shoulder pain   Skin: Negative for color change, pallor and rash  Allergic/Immunologic: Negative  Neurological: Positive for dizziness and weakness  Negative for seizures and headaches  Hematological: Negative  Psychiatric/Behavioral: Negative for agitation, confusion and sleep disturbance  The patient is not nervous/anxious  Past Medical and Surgical History:     Past Medical History:   Diagnosis Date    Acute respiratory failure with hypoxia and hypercapnia (HCC)     Arthritis     Asthma     Hypertension     St[address] ([facility] [address] )     residual RLE weakness       Past Surgical History:   Procedure Laterality Date    CLOSED REDUCTION HUMERUS FRACTURE Right 11/29/2016    Procedure: CLOSED REDUCTION ARM/HUMERUS  CLOSED VS  OPEN SHOULDER REDUCTION;  Surgeon: Ele Torres MD;  [facility];  Service:     CLOSED REDUCTION HUMERUS FRACTURE Right 4/13/2018    Procedure: CLOSED REDUCTION ARM/HUMERUS;  Surgeon: Ele Torres MD;  [facility]: [facility];  Service: Orthopedics    EYE SURGERY      bilateral CAT EXT W/IOLs    RECTAL PROLAPSE REPAIR      done 5-6 yrs ago       Meds/Allergies:    Prior to Admission medications    Medication Sig Start Date End Date Taking?  Authorizing Provider   atorvastatin (LIPITOR) 20 mg tablet Take 1 tablet by mouth daily with dinner for 30 days 12/1/16 7/8/18 Yes Celso Gomez MD   doxazosin (CARDURA) 4 mg tablet Take 4 mg by mouth daily at bedtime   Yes Historical Provider, MD   hydrocortisone-pramoxine (PROCTOFOAM-HC) rectal foam Insert 1 applicator into the rectum 2 (two) times a day 4/18/18  Yes Sebastian Kaplan,    losartan-hydrochlorothiazide (HYZAAR) 100-12 5 MG per tablet Take 1 tablet by mouth daily   Yes Historical Provider, MD   metoprolol tartrate (LOPRESSOR) 50 mg tablet Take 1 tablet (50 mg total) by mouth every 12 (twelve) hours 4/16/18  Yes Debra Aguiar MD   oxybutynin (DITROPAN) 5 mg tablet Take 5 mg by mouth daily     Yes Historical Provider, MD   pantoprazole (PROTONIX) 40 mg tablet Take 1 tablet (40 mg total) by mouth 2 (two) times a day before meals 4/16/18  Yes Debra Aguiar MD     I have reviewed home medications with patient personally  Allergies: No Known Allergies    Social History:     Marital Status:    Occupation: retired  Patient Pre-hospital Living Situation: Lives with her son  Patient Pre-hospital Level of Mobility: Active, uses walker to assist with ambulation  Patient Pre-hospital Diet Restrictions: None reported  Substance Use History:   History   Alcohol Use No     History   Smoking Status    Never Smoker   Smokeless Tobacco    Never Used     History   Drug Use No       Family History:    Family History   Problem Relation Age of Onset    Hypertension Son        Physical Exam:     Vitals:   Blood Pressure: (!) 95/49 (07/08/18 2217)  Pulse: 70 (07/08/18 2217)  Temperature: 97 8 °F (36 6 °C) (07/08/18 2217)  Temp Source: Temporal (07/08/18 2217)  Respirations: 19 (07/08/18 2217)  Height: 4' 2" (127 cm) (07/08/18 2217)  Weight - Scale: 49 7 kg (109 lb 9 1 oz) (07/08/18 2217)  SpO2: 97 % (07/08/18 2217)    Physical Exam   Constitutional: She is oriented to person, place, and time  No distress  Appears ill    HENT:   Head: Normocephalic and atraumatic  Dry mucus membrane   Eyes: EOM are normal  Pupils are equal, round, and reactive to light  Right eye exhibits no discharge  Left eye exhibits no discharge  No scleral icterus     Neck: Normal range of motion  Neck supple  No JVD present  Cardiovascular: Normal rate  Pulmonary/Chest: Effort normal and breath sounds normal  No stridor  No respiratory distress  She has no wheezes  She has no rales  Abdominal: Soft  Bowel sounds are normal  She exhibits distension  There is no tenderness  There is no rebound  Musculoskeletal: Normal range of motion  She exhibits tenderness  She exhibits no edema or deformity  Lymphadenopathy:     She has no cervical adenopathy  Neurological: She is oriented to person, place, and time  No cranial nerve deficit  Coordination normal    Skin: Skin is warm and dry  No rash noted  She is not diaphoretic  No erythema  No pallor  Psychiatric: She has a normal mood and affect  Vitals reviewed  Additional Data:     Lab Results: I have personally reviewed pertinent reports  Results from last 7 days  Lab Units 07/08/18  1306   WBC Thousand/uL 17 51*   HEMOGLOBIN g/dL 9 1*   HEMATOCRIT % 28 1*   PLATELETS Thousands/uL 129*       Results from last 7 days  Lab Units 07/08/18  1306   SODIUM mmol/L 133*   POTASSIUM mmol/L 4 6   CHLORIDE mmol/L 99*   CO2 mmol/L 24   BUN mg/dL 43*   CREATININE mg/dL 2 47*   CALCIUM mg/dL 8 7   TOTAL PROTEIN g/dL 7 0   BILIRUBIN TOTAL mg/dL 0 90   ALK PHOS U/L 79   ALT U/L 18   AST U/L 19   GLUCOSE RANDOM mg/dL 232*       Results from last 7 days  Lab Units 07/08/18  1306   INR  1 16               Imaging: I have personally reviewed pertinent reports  CT chest abdomen pelvis wo contrast   Final Result by Elliot Sutton MD (07/08 1538)      Findings concerning for early or partial small bowel obstruction related to a herniated small bowel loop within a right lower quadrant ventral hernia  5 4 cm fluid collection in the right axilla, new since the prior study  This may represent a seroma or resolving hematoma  Multiple chronic right rib and pelvic fractures    Chronic right shoulder dislocation also again identified  I personally discussed this study with Bradley Busby on 7/8/2018 at 3:37 PM                   Workstation performed: [id number]             EKG, Pathology, and Other Studies Reviewed on Admission:   · EKG: Sinus rhythm at rate of 79 bpm    Allscripts / Epic Records Reviewed: Yes     ** Please Note: This note has been constructed using a voice recognition system   **

## 2018-07-10 PROBLEM — R78.81 GRAM-POSITIVE COCCI BACTEREMIA: Status: ACTIVE | Noted: 2018-01-01

## 2018-07-10 NOTE — PLAN OF CARE
Problem: DISCHARGE PLANNING - CARE MANAGEMENT  Goal: Discharge to post-acute care or home with appropriate resources  INTERVENTIONS:  - Conduct assessment to determine patient/family and health care team treatment goals, and need for post-acute services based on payer coverage, community resources, and patient preferences, and barriers to discharge  - Address psychosocial, clinical, and financial barriers to discharge as identified in assessment in conjunction with the patient/family and health care team  - Arrange appropriate level of post-acute services according to patient's   needs and preference and payer coverage in collaboration with the physician and health care team  - Communicate with and update the patient/family, physician, and health care team regarding progress on the discharge plan  - Arrange appropriate transportation to post-acute venues  Outcome: Progressing  -outpatient follow up after dc

## 2018-07-10 NOTE — OCCUPATIONAL THERAPY NOTE
Occupational Therapy Evaluation     Patient Name: Lonnie Hernandez  QWZHR'Z Date: 7/10/2018  Problem List  Patient Active Problem List   Diagnosis    Dislocation of shoulder region, right, initial encounter    Acute respiratory failure with hypoxia and hypercapnia (HCC)    Benign essential hypertension    GI bleed    Hyperlipidemia    Atrial fibrillation (HCC)    Anemia    Acute blood loss anemia    Vaginal prolapse    Elevated troponin    Hematochezia    Urinary retention    Rectal prolapse    Generalized weakness    Acute kidney injury ([address] )    Hyponatremia    Partial small bowel obstruction (HCC)    Hyperglycemia    Leukocytosis    Mass of right axilla     Past Medical History  Past Medical History:   Diagnosis Date    Acute respiratory failure with hypoxia and hypercapnia (HCC)     Arthritis     Asthma     Hypertension     [address] [address] )     residual RLE weakness     Past Surgical History  Past Surgical History:   Procedure Laterality Date    CLOSED REDUCTION HUMERUS FRACTURE Right 11/29/2016    Procedure: CLOSED REDUCTION ARM/HUMERUS  CLOSED VS  OPEN SHOULDER REDUCTION;  Surgeon: Jimenez Jade MD;  [facility];  Service:     CLOSED REDUCTION HUMERUS FRACTURE Right 4/13/2018    Procedure: CLOSED REDUCTION ARM/HUMERUS;  Surgeon: Jimenez Jade MD;  [facility];  Service: Orthopedics    EYE SURGERY      bilateral CAT EXT W/IOLs    RECTAL PROLAPSE REPAIR      done 5-6 yrs ago           07/10/18 0746   Note Type   Note type Eval/Treat   Restrictions/Precautions   Weight Bearing Precautions Per Order No   Other Precautions Chair Alarm; Bed Alarm;Telemetry; Fall Risk   Pain Assessment   Pain Assessment No/denies pain   Home Living   Additional Comments see PT evaluation for details    Prior Function   Level of [place] Needs assistance with ADLs and functional mobility; Needs assistance with IADLs   Lives With Son   Receives Help From Family   ADL Assistance Needs assistance   IADLs Needs assistance   Falls in the last 6 months 5 to 10   Comments pt's family drives   Psychosocial   Psychosocial (WDL) WDL   ADL   Where Assessed Chair   LB Dressing Assistance 2  Maximal Assistance   LB Dressing Deficit Don/doff R sock; Don/doff L sock   Toileting Assistance  2  Maximal Assistance   Toileting Deficit Perineal hygiene   Additional Comments pt incontinent of stool in hallway and unaware of the same; required max (A) for nadya hygiene while standing in room    Bed Mobility   Supine to Sit 4  Minimal assistance   Additional items Assist x 1;Verbal cues   Sit to Supine (seated in chair at end of session)   Transfers   Sit to Stand 4  Minimal assistance   Additional items Assist x 1;Verbal cues  (RW)   Stand to Sit 4  Minimal assistance   Additional items Assist x 1;Verbal cues  (RW)   Functional Mobility   Functional Mobility 4  Minimal assistance   Additional Comments x1-2; pt requires cues for direction with RW at times and fatigues quickly during mobility   Additional items Rolling walker   Balance   Static Sitting Fair +   Dynamic Sitting Fair +   Static Standing Fair   Dynamic Standing Fair -   Ambulatory Fair -   Activity Tolerance   Activity Tolerance Patient limited by fatigue   RUE Assessment   RUE Assessment X  (3+/5 grossly; WFL AROM )   LUE Assessment   LUE Assessment X  (3+/5 grossly; WFL AROM)   Hand Function   Gross Motor Coordination Functional   Fine Motor Coordination Functional   Sensation   Light Touch No apparent deficits   Sharp/Dull No apparent deficits   Cognition   Overall Cognitive Status Impaired   Arousal/Participation Alert   Attention Within functional limits   Orientation Level Oriented to person;Oriented to place   Memory Decreased short term memory   Following Commands Follows one step commands without difficulty   Assessment   Limitation Decreased ADL status; Decreased UE strength;Decreased Safe judgement during ADL;Decreased endurance;Decreased cognition;Decreased self-care trans;Decreased high-level ADLs   Assessment pt presents at evaluation performing at overall min (A) level with use of RW during FM; pt with fair endurance and fatigues quickly during session; recommend continued OT inervention in order to maximize (I) and short term rehab or 24hr (A) at home recommended    Goals   Short Term Goal  pt will perform UE strengthening and ROM exercises    Long Term Goal #1 pt will perform UB bathing and grooming tasks at min (A) level    Long Term Goal #2 pt will perform FM c RW at mod (I) level with increased endurance    Long Term Goal pt will perform toilet transfers and nadya hygiene at min (A) level    Plan   Treatment Interventions ADL retraining;Functional transfer training;UE strengthening/ROM; Endurance training;Patient/family training; Activityengagement;Cognitive reorientation   Goal Expiration Date 07/24/18   OT Frequency 3-5x/wk   Recommendation   OT Discharge Recommendation 24 hour supervision/assist   OT - OK to Discharge No   Barthel Index   Feeding 5   Bathing 0   Grooming Score 0   Dressing Score 5   Bladder Score 5   Bowels Score 5   Toilet Use Score 5   Transfers (Bed/Chair) Score 10   Mobility (Level Surface) Score 10   Stairs Score 0   Barthel Index Score 45     Pt will benefit from continued OT services in order to maximize (I) c ADL performance, FM c RW, and improve overall endurance/strength required to complete functional tasks in preparation for d/c  Pt left seated in chair at end of session; all needs within reach; all lines intact; scds connected and turned on

## 2018-07-10 NOTE — PROGRESS NOTES
Progress Note - Nephrology   Pao Wilson 82 y o  female MRN: [id number]  Unit/Bed#: 417-01 Encounter: [id number]    A/P:  1  Acute kidney injury: resolving with IVF  Fractional excretion of sodium was 0 08% indicating profound volume depletion  2  Hyponatremia: due to hypovolemia  3  Metabolic alkalosis: responding to volume  4  Partial small bowel obstruction: improving and on clear liquids  5  Anemia: being followed with serial studies       Follow up reason for today's visit: Acute kidney injury    Acute kidney injury [facility])    Patient Active Problem List   Diagnosis    Dislocation of shoulder region, right, initial encounter    Acute respiratory failure with hypoxia and hypercapnia (HCC)    Benign essential hypertension    GI bleed    Hyperlipidemia    Atrial fibrillation (HCC)    Anemia    Acute blood loss anemia    Vaginal prolapse    Elevated troponin    Hematochezia    Urinary retention    Rectal prolapse    Generalized weakness    Acute kidney injury ([address] )    Hyponatremia    Partial small bowel obstruction (HCC)    Hyperglycemia    Leukocytosis    Mass of right axilla         Subjective:   Feels better today  Denies headaches dizziness chest pain shortness of breath abdominal pain nausea or vomiting  She has not had a bowel movement  But she is urinating well  Objective:     Vitals: Blood pressure 133/61, pulse 72, temperature 97 7 °F (36 5 °C), temperature source Temporal, resp  rate 19, height 4' 8" (1 422 m), weight 51 4 kg (113 lb 5 1 oz), SpO2 99 %  ,Body mass index is 25 4 kg/m²      Weight (last 2 days)     Date/Time   Weight    07/10/18 0600  51 4 (113 32)    07/09/18 1002  49 6 (109 35)    07/09/18 0600  49 6 (109 35)    07/08/18 2217  49 7 (109 57)    07/08/18 1241  48 8 (107 5)                Intake/Output Summary (Last 24 hours) at 07/10/18 0911  Last data filed at 07/10/18 0800   Gross per 24 hour   Intake             1510 ml   Output             1095 ml   Net 415 ml     I/O last 3 completed shifts: In: 2724 6 [P O :320; I V :2404 6]  Out: 1095 [Urine:1095]         Physical Exam: /61 (BP Location: Left arm)   Pulse 72   Temp 97 7 °F (36 5 °C) (Temporal)   Resp 19   Ht 4' 8" (1 422 m) Comment: There appears to be some discrepancy in accurate ht  Per chart review:  4'2"  However, pt states 4'9" and pt's family reports 4'8"  Wt 51 4 kg (113 lb 5 1 oz)   SpO2 99%   BMI 25 40 kg/m²     General Appearance:    Alert, cooperative, no distress, appears stated age   Head:    Normocephalic, without obvious abnormality, atraumatic   Eyes:    Conjunctiva/corneas clear   Ears:    Normal external ears   Nose:   Nares normal, septum midline, mucosa normal, no drainage    or sinus tenderness   Throat:   Lips, mucosa, and tongue normal; teeth and gums normal   Neck:   Supple, symmetrical, trachea midline, no adenopathy;        thyroid:  No enlargement/tenderness/nodules; no carotid    bruit or JVD   Back:     Symmetric, no curvature, ROM normal, no CVA tenderness   Lungs:     Dec to auscultation bilaterally, respirations unlabored   Chest wall:    No tenderness or deformity   Heart:    Regular rate and rhythm, S1 and S2 normal, no murmur, rub   or gallop   Abdomen:     Soft, non-tender, bowel sounds present   Extremities:   Extremities normal, atraumatic, no cyanosis or edema   Skin:   Skin color, texture, turgor normal, no rashes or lesions   Lymph nodes:   Cervical normal   Neurologic:   CNII-XII intact            Lab, Imaging and other studies: I have personally reviewed pertinent labs    CBC: Lab Results   Component Value Date    WBC 12 80 (H) 07/10/2018    HGB 8 5 (L) 07/10/2018    HCT 26 4 (L) 07/10/2018    MCV 93 07/10/2018     (L) 07/10/2018    MCH 29 9 07/10/2018    MCHC 32 2 07/10/2018    RDW 16 5 (H) 07/10/2018    MPV 11 5 07/10/2018     CMP: Lab Results   Component Value Date     07/10/2018    K 3 9 07/10/2018     07/10/2018    CO2 21 07/10/2018    ANIONGAP 8 07/10/2018    BUN 32 (H) 07/10/2018    CREATININE 1 30 07/10/2018    GLUCOSE 128 07/10/2018    CALCIUM 7 6 (L) 07/10/2018    AST 14 07/10/2018    ALT 15 07/10/2018    ALKPHOS 65 07/10/2018    PROT 5 3 (L) 07/10/2018    BILITOT 0 50 07/10/2018    EGFR 39 07/10/2018         Results from last 7 days  Lab Units 07/10/18  0626 07/09/18  0449 07/08/18  1306   SODIUM mmol/L 136 137 133*   POTASSIUM mmol/L 3 9 4 1 4 6   CHLORIDE mmol/L 107 105 99*   CO2 mmol/L 21 23 24   BUN mg/dL 32* 45* 43*   CREATININE mg/dL 1 30 1 96* 2 47*   CALCIUM mg/dL 7 6* 8 0* 8 7   TOTAL PROTEIN g/dL 5 3* 5 8* 7 0   BILIRUBIN TOTAL mg/dL 0 50 0 60 0 90   ALK PHOS U/L 65 68 79   ALT U/L 15 15 18   AST U/L 14 19 19   GLUCOSE RANDOM mg/dL 128 120 232*         Phosphorus: No results found for: PHOS  Magnesium:   Lab Results   Component Value Date    MG 1 9 07/10/2018     Urinalysis: No results found for: COLORU, CLARITYU, SPECGRAV, PHUR, LEUKOCYTESUR, NITRITE, PROTEINUA, GLUCOSEU, KETONESU, BILIRUBINUR, BLOODU  Ionized Calcium: No results found for: CAION  Coagulation: No results found for: PT, INR, APTT  Troponin: No results found for: TROPONINI  ABG: No results found for: PHART, NLM5VRR, PO2ART, BTE0OKT, D8XZYDON, BEART, SOURCE  Radiology review:     IMAGING  Procedure: Ct Chest Abdomen Pelvis Wo Contrast    Result Date: 7/8/2018  Narrative: CT CHEST, ABDOMEN AND PELVIS WITHOUT IV CONTRAST INDICATION:   Shortness of breath, abdominal pain, hypotensive, renal failure  COMPARISON: 4/12/2018  TECHNIQUE: CT examination of the chest, abdomen and pelvis was performed without intravenous contrast   Axial, sagittal, and coronal 2D reformatted images were created from the source data and submitted for interpretation  Radiation dose length product (DLP) for this visit:  281 88 mGy-cm     This examination, like all CT scans performed in the [facility], was performed utilizing techniques to minimize radiation dose exposure, including the use of iterative  reconstruction and automated exposure control  Enteric contrast was administered  FINDINGS: CHEST LUNGS:  Lungs are clear  There is no tracheal or endobronchial lesion  PLEURA:  Unremarkable  HEART/GREAT VESSELS:  Normal heart size  Coronary artery calcifications noted  Normal caliber thoracic aorta with mild atherosclerotic calcifications  MEDIASTINUM AND BABAR:  Unremarkable  CHEST WALL AND LOWER NECK:   Multiple chronic right rib fractures again identified  There is a fairly well-circumscribed 5 4 x 4 9 cm collection of simple fluid in the right axillary region, which may represent a seroma or resolving hematoma  ABDOMEN Absence of intravenous contrast enhancement limits evaluation of the solid abdominal viscera  LIVER/BILIARY TREE:  Unremarkable  GALLBLADDER:  No calcified gallstones  No pericholecystic inflammatory change  SPLEEN:  Unremarkable  PANCREAS:  Unremarkable  ADRENAL GLANDS:  Unremarkable  KIDNEYS/URETERS:  Unremarkable  No hydronephrosis  STOMACH AND BOWEL:  Several mildly dilated small bowel loops in the lower abdomen to the level of a herniated loop of bowel in a small right paramedian ventral hernia in the lower abdomen, concerning for early or partial small bowel obstruction  APPENDIX:  No findings to suggest appendicitis  ABDOMINOPELVIC CAVITY:  No ascites or free intraperitoneal air  No lymphadenopathy  VESSELS:  Unremarkable for patient's age  PELVIS REPRODUCTIVE ORGANS:  Unremarkable for patient's age  URINARY BLADDER:  Unremarkable  ABDOMINAL WALL/INGUINAL REGIONS:  Small right paramedian ventral hernia in the lower abdomen containing a loop of small bowel, as above  This is new since the prior exam  OSSEOUS STRUCTURES:  No acute fracture or destructive osseous lesion  Chronic fractures of the right ribs, bilateral sacral ala, and left pubic rami appear unchanged  Chronic right shoulder dislocation appears unchanged    Unchanged vertebral wedge deformity of L2  Impression: Findings concerning for early or partial small bowel obstruction related to a herniated small bowel loop within a right lower quadrant ventral hernia  5 4 cm fluid collection in the right axilla, new since the prior study  This may represent a seroma or resolving hematoma  Multiple chronic right rib and pelvic fractures  Chronic right shoulder dislocation also again identified  I personally discussed this study with Margret Dyson on 7/8/2018 at 3:37 PM  Workstation performed: [id number]     Procedure: Us Kidney And Bladder    Result Date: 7/9/2018  Narrative: RENAL ULTRASOUND INDICATION:   acute renal failure  COMPARISON: None TECHNIQUE:   Ultrasound of the retroperitoneum was performed with a curvilinear transducer utilizing volumetric sweeps and still imaging techniques  FINDINGS: KIDNEYS: Symmetric and normal size  Right kidney:  10 3 x 4 3 cm  Left kidney:  9 5 x 4 9 cm  Right kidney Normal echogenicity and contour  No suspicious masses detected  Multiple simple cortical cysts (largest 1 3 cm)  No hydronephrosis  No shadowing calculi  Small right perirenal fluid collection  Left kidney Normal echogenicity and contour  No suspicious masses detected  No hydronephrosis  No shadowing calculi  No perinephric fluid collections  URETERS: Nonvisualized  BLADDER: Normally distended  No focal thickening or mass lesions  Left ureteral jet detected  Impression: Negative for evidence of obstructive uropathy  Small right perinephric fluid collection, nonspecific (but may be related to infection)  Workstation performed: [id number]     Procedure: Us Extremity Soft Tissue    Result Date: 7/9/2018  Narrative: UPPER EXTREMITY ULTRASOUND INDICATION:  Right axillary lump COMPARISON:  None  FINDINGS: Ultrasound evaluation of the right axilla performed to evaluate lump   A 5 6 x 4 9 x 5 7 cm flex, primarily cystic lesion with irregular borders, internal debris and enhanced through transmission is seen in the right axilla at the site of clinical concern  Impression: Complex right axillary lesion likely a abscess or complicated cyst/hematoma  Correlation with the clinical findings recommended in this regard and, if warranted, short interval follow-up following appropriate clinical therapy suggested  Workstation performed: [id number]       Current Facility-Administered Medications   Medication Dose Route Frequency    acetaminophen (TYLENOL) tablet 650 mg  650 mg Oral Q6H PRN    atorvastatin (LIPITOR) tablet 20 mg  20 mg Oral Daily With Dinner    doxazosin (CARDURA) tablet 4 mg  4 mg Oral HS    hydrocortisone (ANUSOL-HC, PROCTOSOL HC)) 2 5 % rectal cream   Rectal BID    menthol-zinc oxide (CALMOSEPTINE) 0 44-20 6 % ointment   Topical BID    metoprolol tartrate (LOPRESSOR) tablet 50 mg  50 mg Oral Q12H [address]    ondansetron (ZOFRAN) injection 4 mg  4 mg Intravenous Q6H PRN    oxybutynin (DITROPAN) tablet 5 mg  5 mg Oral Daily    pantoprazole (PROTONIX) EC tablet 40 mg  40 mg Oral BID AC    sodium chloride 0 9 % infusion  100 mL/hr Intravenous Continuous     Medications Discontinued During This Encounter   Medication Reason    sodium chloride 0 9 % infusion     losartan potassium-hydrochlorothiazide (HYZAAR 100/12  5) combo dose     hydrocortisone-pramoxine (ANALPRAM-HC) 2 5-1 % rectal cream        Scar Grady MD

## 2018-07-10 NOTE — PHYSICAL THERAPY NOTE
PT Treatment Note      07/10/18 0800   Restrictions/Precautions   Other Precautions (Chair Alarm; Bed Alarm;Telemetry; Fall Risk)   Bed Mobility   Additional Comments see OT note for bed mobility   Transfers   Sit to Stand 4  Minimal assistance   Additional items Assist x 1;Verbal cues   Stand to Sit 4  Minimal assistance   Additional items Assist x 1; Armrests; Verbal cues; Increased time required   Stand pivot (CGA)   Ambulation/Elevation   Gait pattern Forward Flexion; Short stride  (decreased gait speed)   Gait Assistance 4  Minimal assist   Additional items Assist x 1   Assistive Device Rolling walker   Distance 65'   Balance   Static Sitting Fair +   Dynamic Sitting Fair +   Static Standing Fair   Dynamic Standing [address] -  (with RW)   Endurance Deficit   Endurance Deficit Yes   Activity Tolerance   Activity Tolerance Patient limited by fatigue   Assessment   Prognosis Good   Problem List Decreased strength;Decreased endurance; Impaired balance;Decreased mobility; Decreased safety awareness   Assessment Performing tx/ambulation at (min-CGA) x1-2  Min for tx with cues for hand placement  Fatigued with ambulation  Fair balance and stability with RW  She will benefit from continued PT to address her deficits  Plan   Treatment/Interventions Functional transfer training;LE strengthening/ROM; Therapeutic exercise; Endurance training;Bed mobility;Gait training   Progress Slow progress, cognitive deficits   Recommendation   Recommendation Home with family support;Home PT; Short-term skilled PT  (pending progress)   Pt  OOB with call bell within reach, scd's connected and turned on and alarm on at end of PT session

## 2018-07-10 NOTE — PROGRESS NOTES
[facility] Internal [facility] Progress Note  Patient: Lina Dan 82 y o  female   MRN: [id number]  PCP: Emerita Lieberman DO  Unit/Bed#: 600-23 Encounter: [id number]  Date Of Visit: 07/10/18    Assessment:    Principal Problem:    Acute kidney injury ([address] )  Active Problems:    Benign essential hypertension    Hyperlipidemia    Generalized weakness    Hyponatremia    Partial small bowel obstruction (HCC)    Hyperglycemia    Leukocytosis    Mass of right axilla    Gram-positive cocci bacteremia      Plan:    · Wean off IV fluid, hyponatremia resolved with fluids  · NANETTE resolving & appears prerenal in etiology due to hypovolemia  · Re G+ve coccemia x 2 bottles 18, start empiric IV vancomycin & consult ID, obtain TTE( ? Vegetation )  · No discernible skin or soft tissue infection  · Further intervention per surg team      VTE Pharmacologic Prophylaxis:   Pharmacologic: Patient has refused VTE prophylaxis  Mechanical VTE Prophylaxis in Place: Yes    Patient Centered Rounds: I have performed bedside rounds with nursing staff today  Current Length of Stay: 2 day(s)    Current Patient Status: Inpatient     Code Status: Level 1 - Full Code      Subjective:   Patient seen & examined this PM,     Objective:     Vitals:   Temp (24hrs), Av 1 °F (37 3 °C), Min:97 7 °F (36 5 °C), Max:100 2 °F (37 9 °C)    HR:  [72-90] 90  Resp:  [18-20] 18  BP: (113-142)/(55-70) 133/60  SpO2:  [94 %-99 %] 95 %  Body mass index is 25 4 kg/m²  Input and Output Summary (last 24 hours):        Intake/Output Summary (Last 24 hours) at 07/10/18 1940  Last data filed at 07/10/18 1215   Gross per 24 hour   Intake          2388 33 ml   Output              500 ml   Net          1888 33 ml       Physical Exam:   General: in no overt distress  HEENT: oral mucosa moist, not pale, not jaundiced  Chest: clear lungs, no wheeze  Heart: S1 S2 audible, regular  Abd: soft, nontender, bowel sounds present  Psych: appropriately oriented in all spheres  Neuro: Awake, alert, essentially nonfocal      Additional Data:     Labs:      Results from last 7 days  Lab Units 07/10/18  0626   WBC Thousand/uL 12 80*   HEMOGLOBIN g/dL 8 5*   HEMATOCRIT % 26 4*   PLATELETS Thousands/uL 115*   LYMPHO PCT % 5*   MONO PCT MAN % 1*   EOSINO PCT MANUAL % 0       Results from last 7 days  Lab Units 07/10/18  0626   SODIUM mmol/L 136   POTASSIUM mmol/L 3 9   CHLORIDE mmol/L 107   CO2 mmol/L 21   BUN mg/dL 32*   CREATININE mg/dL 1 30   CALCIUM mg/dL 7 6*   TOTAL PROTEIN g/dL 5 3*   BILIRUBIN TOTAL mg/dL 0 50   ALK PHOS U/L 65   ALT U/L 15   AST U/L 14   GLUCOSE RANDOM mg/dL 128       Results from last 7 days  Lab Units 07/09/18  0449   INR  1 11     Invalid input(s): TROIP    * I Have Reviewed All Lab Data Listed Above  * Additional Pertinent Lab Tests Reviewed:  Katya Morel Admission Reviewed    Imaging:    Imaging Reports Reviewed Today Include: US      Recent Cultures (last 7 days):       Results from last 7 days  Lab Units 07/08/18  2323   GRAM STAIN RESULT  Gram positive cocci in clusters  Gram positive cocci in clusters       Last 24 Hours Medication List:     Current Facility-Administered Medications:  acetaminophen 650 mg Oral Q6H PRN Arpit Vickers PA-C    atorvastatin 20 mg Oral Daily With Dinner Arpit Vickers PA-C    doxazosin 4 mg Oral HS Arpit Vickers PA-C    hydrocortisone  Rectal BID Arpit Vickers PA-C    menthol-zinc oxide  Topical BID Sophia Duran MD    metoprolol tartrate 50 mg Oral Q12H [address] Arpit Vickers PA-C    ondansetron 4 mg Intravenous Q6H PRN Arpit Vickers PA-C    oxybutynin 5 mg Oral Daily Arpit Vickers PA-C    pantoprazole 40 mg Oral BID AC Arpit Vickers PA-C    sodium chloride 75 mL/hr Intravenous Continuous Erika Arana MD Last Rate: 100 mL/hr (07/10/18 1924)   vancomycin 15 mg/kg Intravenous Q48H Erika Arana MD         Today, Patient Was Seen By: Erika Arana MD    ** Please Note: Dragon 360 Dictation voice to text software may have been used in the creation of this document   **

## 2018-07-11 PROBLEM — L89.152 PRESSURE ULCER OF SACRAL REGION, STAGE 2 (HCC): Chronic | Status: ACTIVE | Noted: 2018-01-01

## 2018-07-11 PROBLEM — R78.81 GRAM-POSITIVE COCCI BACTEREMIA: Status: RESOLVED | Noted: 2018-01-01 | Resolved: 2018-01-01

## 2018-07-11 NOTE — PLAN OF CARE
DISCHARGE PLANNING     Discharge to home or other facility with appropriate resources Progressing        DISCHARGE PLANNING - CARE MANAGEMENT     Discharge to post-acute care or home with appropriate resources Progressing        Knowledge Deficit     Patient/family/caregiver demonstrates understanding of disease process, treatment plan, medications, and discharge instructions Progressing        Nutrition/Hydration-ADULT     Nutrient/Hydration intake appropriate for improving, restoring or maintaining nutritional needs Progressing        PAIN - ADULT     Verbalizes/displays adequate comfort level or baseline comfort level Progressing        Potential for Falls     Patient will remain free of falls Progressing        Prexisting or High Potential for Compromised Skin Integrity     Skin integrity is maintained or improved Progressing        SAFETY ADULT     Maintain or return to baseline ADL function Progressing     Maintain or return mobility status to optimal level Progressing

## 2018-07-11 NOTE — PROGRESS NOTES
[facility] Progress Note  Patient: Shauna Caro 82 y o  female   MRN: [id number]  PCP: Moe Beal DO  Unit/Bed#: 292-08 Encounter: [id number]  Date Of Visit: 07/11/18    Assessment:    Principal Problem:    Staphylococcus aureus bacteremia  Active Problems:    Benign essential hypertension    Hyperlipidemia    Acute blood loss anemia    Rectal prolapse    Generalized weakness    Acute kidney injury ([address] )    Hyponatremia    Partial small bowel obstruction (HCC)    Hyperglycemia    Leukocytosis    Mass of right axilla      Plan:    · NANETTE - resolving, eGFR 51 4/13/18  · Staph aureus bacteremia - source unclear, suspect R axilla abscess/infected hematoma vs IE, would require PICC placement once cultures clear for 2-4 weeks of IV antibiotic  · R axilla painful tender erythematous swelling - abscess vs complicated cyst/hematoma, present past few months per patient  · Acute on chronic anemia - 1g% Hb drop overnight, suspect ABLA due to hemotochezia, UA unremarkable  · Rectal prolapse - likely source of occult GI bleed  · HTN - controlled with beta blocker  · Generalized weakness - ultimately may require SNF placement for Rehab    -wean off IV fluids overnight, renal fxn near baseline, avoid nephrotoxic agents  -cont IV vancomycin, surveillance bld cx sent, no valve vegetations on TTE, MARTINEZ if persistent bacteremia  -CT R axilla + contrast, monitor renal fxn post contrast exposure, would require I+D if abscess +ve  -FOBT, ferritin, reticulocyte count, PRBC transfusion if Hb <7, start stool softener  -ID + Surg inputs appreciated      VTE Pharmacologic Prophylaxis:   Pharmacologic: Pharmacologic VTE Prophylaxis contraindicated due to ABLA  Mechanical VTE Prophylaxis in Place: Yes    Patient Centered Rounds: I have performed bedside rounds with nursing staff today      Current Length of Stay: 3 day(s)    Current Patient Status: Inpatient     Code Status: Level 1 - Full Code      Subjective:   Patient seen & examined this afternoon, has no new complaint; denies nausea, malaise, fever or chills  Mildly bleeding prolapsed rectum following defecation noted by nursing staff this afternoon, manually reduced subsequently  Painful tender swelling R axilla present past "few months" per patient & unchanged    Objective:     Vitals:   Temp (24hrs), Av 8 °F (37 1 °C), Min:97 9 °F (36 6 °C), Max:99 7 °F (37 6 °C)    HR:  [69-86] 82  Resp:  [18] 18  BP: (116-141)/(58-77) 136/64  SpO2:  [94 %-100 %] 100 %  Body mass index is 26 44 kg/m²  Input and Output Summary (last 24 hours):        Intake/Output Summary (Last 24 hours) at 18 1643  Last data filed at 18 1501   Gross per 24 hour   Intake          1238 33 ml   Output             1100 ml   Net           138 33 ml       Physical Exam:   General: elderly, in no overt distress  HEENT: not pale, not jaundiced  Chest: clear lungs, no wheeze  Heart: S1 S2 audible, regular, no murmur  Abd: soft, nontender, bowel sounds present  Psych: appropriately oriented in all spheres  Skin: softball sized firm exquisitely tender semi-mobile erythematous mass R anterior axilla  Neuro: Awake, alert, verbally responsive, essentially nonfocal      Additional Data:     Labs:      Results from last 7 days  Lab Units 18  0512 07/10/18  0626   WBC Thousand/uL 11 53* 12 80*   HEMOGLOBIN g/dL 7 5* 8 5*   HEMATOCRIT % 23 5* 26 4*   PLATELETS Thousands/uL 117* 115*   LYMPHO PCT %  --  5*   MONO PCT MAN %  --  1*   EOSINO PCT MANUAL %  --  0       Results from last 7 days  Lab Units 18  0512   SODIUM mmol/L 134*   POTASSIUM mmol/L 3 6   CHLORIDE mmol/L 106   CO2 mmol/L 20*   BUN mg/dL 26*   CREATININE mg/dL 1 25   CALCIUM mg/dL 7 8*   TOTAL PROTEIN g/dL 5 1*   BILIRUBIN TOTAL mg/dL 0 40   ALK PHOS U/L 70   ALT U/L 14   AST U/L 14   GLUCOSE RANDOM mg/dL 115       Results from last 7 days  Lab Units 18  0449   INR  1 11     Invalid input(s): TROIP    * I Have Reviewed All Lab Data Listed Above  * Additional Pertinent Lab Tests Reviewed: All Labs Within Last 24 Hours Reviewed      Recent Cultures (last 7 days):       Results from last 7 days  Lab Units 07/08/18  2323   BLOOD CULTURE  Staphylococcus aureus*  Staphylococcus aureus*   GRAM STAIN RESULT  Gram positive cocci in clusters  Gram positive cocci in clusters       Last 24 Hours Medication List:     Current Facility-Administered Medications:  acetaminophen 650 mg Oral Q6H PRN Arpit Vickers, PA-RENETTA    atorvastatin 20 mg Oral Daily With Dinner Arpit Vickers, PA-RENETTA    doxazosin 4 mg Oral HS Arpit Vickers, PA-RENETTA    hydrocortisone  Rectal BID Arpit Vickers, MYA    menthol-zinc oxide  Topical BID Sophia Duran MD    metoprolol tartrate 50 mg Oral Q12H [facility] Arpit Vickers PA-C    ondansetron 4 mg Intravenous Q6H PRN Arpit Vickers, MYA    oxybutynin 5 mg Oral Daily Arpit Vickers, MYA    pantoprazole 40 mg Oral BID AC Arpit Vickers PA-C    senna-docusate sodium 1 tablet Oral BID Kena Márquez MD    sodium chloride 75 mL/hr Intravenous Continuous Kena Márquez MD Last Rate: 75 mL/hr (07/10/18 1950)   vancomycin 15 mg/kg Intravenous Q24H Kena Márquez MD         Today, Patient Was Seen By: Kena Márquez MD    ** Please Note: Dragon 360 Dictation voice to text software may have been used in the creation of this document   **

## 2018-07-11 NOTE — OCCUPATIONAL THERAPY NOTE
OT Note     07/11/18 1016   Restrictions/Precautions   Other Precautions Multiple lines   Bed Mobility   Rolling L 4  Minimal assistance   Additional items Assist x 1;Verbal cues; Bedrails   Supine to Sit 3  Moderate assistance   Additional items Assist x 2;Bedrails;HOB elevated; Increased time required;Verbal cues;LE management   Functional Mobility   Functional Mobility (Refer PT note)   Activity Tolerance   Activity Tolerance Patient limited by pain   Assessment   Assessment Presents supine, requires much encouragement to participate  Resistive to attempt OOB, importance increasing LOF reiterated  Pt  guarding RUE, positioned to avoid increased discomfort on supine to sit  Tolerates EOB with handhold bedrail however continues to be resistive to OOB to chair  Refer PT note for details txfr     Recommendation   Recommendation (Continue OT services )

## 2018-07-11 NOTE — PROGRESS NOTES
Progress Note - General Surgery   Wagner Arellano 82 y o  female MRN: [id number]  Unit/Bed#: 199-78 Encounter: [id number]    Assessment:  Mass right axilla, possibly old hematoma present for several months  Patient needs CT scan of the mass in the right axilla with contrast to determine whether not this is an abscess formation  Leukocytosis 11 53  Patient has 2 positive blood cultures for Staph aureus  Rectal prolapse, reduced after bowel movement today  Partial small bowel obstruction, resolved  Reducible incisional hernia  Acute blood loss anemia, hemoglobin today 7 5  Plan:  Recommend CT scan with contrast of the mass in the right axilla to determine whether not this is fluid filled or possible hematoma  This will determine whether not needle aspiration can be performed and sent for culture  Discussed with Dr Aristeo Cuevas who initially saw the patient on consultation 2 days ago  It was felt that the mass likely represented a hematoma which been present for several months  The rectal prolapse has been reduced  Patient should be on stool softeners  At this time surgical intervention is not indicated unless the prolapse could not be manually reduced  Repeat a m  labs including CBC, transfuse as needed    History of Present Illness     HPI:  Wagner Arellano is a 82 y o  female who presents with a partial small-bowel obstruction  Patient was noted to have a mass in her right axilla  The patient is not frankly reliable as historian  She presented with generalized weakness  She had minimal verbal response at baseline upon admission  At present she is currently sitting up in bedside chair  She moved her bowels this afternoon  There was blood noted and the nurse noted the patient likely had a prolapsed rectum which she was able to manually reduce at bedside after the patient moved her bowels with bleeding noted  The patient has positive blood cultures for Staph aureus    She has a leukocytosis of 11 53  Hospitalist physician requested evaluation by the general surgery team about the mass in the right axilla whether not this could be a midas of the patient's positive blood cultures and elevated white count  The patient has been afebrile  Current temperature is 36 7  Scheduled Meds:  Current Facility-Administered Medications:  acetaminophen 650 mg Oral Q6H PRN Arpit Vickers PA-C    atorvastatin 20 mg Oral Daily With Dinner Arpit Vickers PA-C    doxazosin 4 mg Oral HS Arpit Vickers PA-C    hydrocortisone  Rectal BID Arpit Vickers PA-C    menthol-zinc oxide  Topical BID Sophia Duran MD    metoprolol tartrate 50 mg Oral Q12H [facility] Arpit Vickers PA-C    ondansetron 4 mg Intravenous Q6H PRN Arpit Vickers PA-C    oxybutynin 5 mg Oral Daily Arpit Vickers PA-C    pantoprazole 40 mg Oral BID AC Arpit Vickers PA-C    sodium chloride 75 mL/hr Intravenous Continuous Apollo Metzger MD Last Rate: 75 mL/hr (07/10/18 1950)   vancomycin 15 mg/kg Intravenous Q24H Apollo Metzger MD      Continuous Infusions:  sodium chloride 75 mL/hr Last Rate: 75 mL/hr (07/10/18 1950)     PRN Meds:   acetaminophen    ondansetron      Consults    Objective:  Patient is awake  She seems cooperative though does not provide much verbal response to questions  She is no apparent distress  Skin warm and dry to touch  Head normocephalic  ENT unremarkable  No nystagmus  Oral mucosa is pink and moist   Neck trachea midline  No adenopathy or goiter  No increased JVP  Chest symmetric  There is a large soft semi mobile mass measuring 11 x 15 cm in the right axilla which is not frankly tender or warm to touch  The skin has moderate erythema which does not brianna with palpation  There is no firmness or loculation palpable  No definite crepitus of the skin  No drainage or open areas noted  The patient had a rectal prolapse was would manually reduced by the RN at bedside this afternoon      Blood pressure 141/71, pulse 85, temperature 98 °F (36 7 °C), temperature source Temporal, resp  rate 18, height 4' 8" (1 422 m), weight 53 5 kg (117 lb 15 1 oz), SpO2 100 %  ,Body mass index is 26 44 kg/m²  Intake/Output Summary (Last 24 hours) at 07/11/18 1413  Last data filed at 07/11/18 1241   Gross per 24 hour   Intake          1238 33 ml   Output              600 ml   Net           638 33 ml       Invasive Devices     Peripheral Intravenous Line            Peripheral IV 07/10/18 Left Hand less than 1 day                Lab, Imaging and other studies:  I have personally reviewed pertinent lab results        CBC:   Lab Results   Component Value Date    WBC 11 53 (H) 07/11/2018    HGB 7 5 (L) 07/11/2018    HCT 23 5 (L) 07/11/2018    MCV 93 07/11/2018     (L) 07/11/2018    MCH 29 8 07/11/2018    MCHC 31 9 07/11/2018    RDW 16 2 (H) 07/11/2018    MPV 11 1 07/11/2018   , CMP:   Lab Results   Component Value Date     (L) 07/11/2018    K 3 6 07/11/2018     07/11/2018    CO2 20 (L) 07/11/2018    ANIONGAP 8 07/11/2018    BUN 26 (H) 07/11/2018    CREATININE 1 25 07/11/2018    GLUCOSE 115 07/11/2018    CALCIUM 7 8 (L) 07/11/2018    AST 14 07/11/2018    ALT 14 07/11/2018    ALKPHOS 70 07/11/2018    PROT 5 1 (L) 07/11/2018    BILITOT 0 40 07/11/2018    EGFR 40 07/11/2018     Protime-INR   Order: [id number]   Status:  Final result   Visible to patient:  No (Inaccessible in [address])   Next appt:  08/14/2018 at 01:30 PM in Gastroenterology Miladys Benavides PA-C)    Ref Range & Units 7/9/18 0449   Protime 11 8 - 14 2 seconds 13 8    INR 0 86 - 1 17 1 11       Specimen Collected: 07/09/18 04:49   Last Resulted: 07/09/18 06:00              Blood culture   Order: [id number]   Status:  Preliminary result   Visible to patient:  No (Not Released)   Next appt:  08/14/2018 at 01:30 PM in Gastroenterology Miladys Benavides PA-C)   Specimen Information: Arm, Left; Blood        Blood Culture Staphylococcus aureus               GRAM STAIN RESULT  Gram positive cocci in clusters               Specimen Collected: 07/08/18 23:23   Last Resulted: 07/11/18 07:59                  Blood culture   Order: [id number]   Status:  Preliminary result   Visible to patient:  No (Not Released)   Next appt:  08/14/2018 at 01:30 PM in Gastroenterology Sly Cintron PA-C)   Specimen Information: Arm, Right; Blood        Blood Culture Staphylococcus aureus               GRAM STAIN RESULT  Gram positive cocci in clusters               Specimen Collected: 07/08/18 23:23   Last Resulted: 07/11/18 07:57                VTE Pharmacologic Prophylaxis: Reason for no pharmacologic prophylaxis Contraindicated because of acute blood loss anemia  VTE Mechanical Prophylaxis: sequential compression device     Time spent by this provider was 30 minutes today including examination patient as well as review of diagnostic imaging laboratory studies      Zac Shelton PA-C

## 2018-07-11 NOTE — TELEMEDICINE
Consultation - Infectious Disease   Alex Johnston 82 y o  female MRN: [id number]  Unit/Bed#: 467-07 Encounter: [id number]      Inpatient consult to Infectious Diseases  Consult performed by: Kassidy Mcdowell ordered by: Karon Banks            REQUIRED DOCUMENTATION:     1  This service was provided via Telemedicine  2  Provider located at Home  3  TeleMed provider: Liv Garcia MD   4  Identify all parties in room with patient during tele consult:  RN  5  After connecting through [place], patient was identified by name and date of birth and assistant checked wristband  Patient was then informed that this was a Telemedicine visit and that the exam was being conducted confidentially over secure lines  My office door was closed  No one else was in the room  Patient acknowledged consent and understanding of privacy and security of the Telemedicine visit, and gave us permission to have the assistant stay in the room in order to assist with the history and to conduct the exam   I informed the patient that I have reviewed their record in Epic and presented the opportunity for them to ask any questions regarding the visit today  The patient agreed to participate  Assessment/Recommendations     82year old female with recent fall, right shoulder dislocation presents with Staph aureus bacteremia and suspected axillary hematoma and/or abscess    1  S aureus bacteremia  - Source uncertain  Patient has a new axillary fluid collection which may be an abscess and/or infected hematoma v/s endocarditis    · Continue Vancomycin  Goal trough 15-20  If isolate is methicillin susceptible would discontinue Vancomycin and switch to Cefazolin 2g iv q8h  · Follow repeat blood cultures  · Recommend CT imaging with contrast to characterize axillary mass  · Await results of TTE   If patient remains bacteremic would recommend MARTINEZ  · Patient will need PICC placement once cultures clear  · Anticipate 2-4 weeks of therapy based on clinical course    2  Rectal prolapse, reducible incisional hernia  Management per primary team and surgery    3  Acute on chronic anemia  Management per primary team    4  Acute kidney injury, resolving  Management per primary team and nephrology    Thank you for involving me in the care of your patient  Please call with questions, change in clinical status or if tests recommended above are abnormal      Discussed with the primary service  History     Reason for Consult: Bacteremia  HPI: Milvia Mustafa is a 82y o  year old female with a history of stroke and residual right lower extremity weakness  She also has a history of recurrent falls and was recently admitted in April at [facility] when she developed an anterior shoulder dislocation following a fall  She was taken to the operating room however it remained subluxed and the patient's family opted for conservative management  The hospital stay was also complicated by acute blood loss anemia, presumably due to an upper GI bleed but patient declined EGD/colonoscopy  She states she was brought to the ER on 7/8 by her son because of a month of feeling weak and tired with progressive worsening of symptoms  Patient states she is "getting old"  She had no fever or chills  No open sores or wounds  No chest pain  She has had right shoulder pain and a right shoulder mass since her fall in April but denies any worsening pain or swelling  In the ER she was afebrile  She had leukocytosis, elevated creatinine  CT of the chest and abdomen noted possible early or partial small bowel obstruction related to herniated small bowel loop within a ventral hernia  She was also noted to have a new 5 4 cm fluid collection in the right axilla thought to represent a seroma or resolving hematoma  EKG showed no acute ST changes  She was admitted  General surgery was consulted    She had no nausea vomiting or abdominal pain so no plan for surgical intervention for CT imaging of suspected partial obstruction was recommended  It was also felt that the fluid collection the right axilla was consistent with an old hematoma from shoulder realignment for a dislocated shoulder in a previous admission  An ultrasound was obtained which showed a complex right axillary lesion thought to be an abscess or a complicated cyst/hematoma  Blood cultures from admission grew Staph aureus and she was started on vancomycin on 07/10  Since admission she has been afebrile and her white count is improving  Denies nausea, vomiting, diarrhea or rash and is tolerating antibiotics well  Infectious disease is being consulted for diagnostic work up and antibiotic management  Review of Systems  A [id number] point system-based review of systems is otherwise negative  PAST MEDICAL HISTORY:  Past Medical History:   Diagnosis Date    Acute respiratory failure with hypoxia and hypercapnia (HCC)     Arthritis     Asthma     Hypertension     Stroke ([address] )     residual RLE weakness     Past Surgical History:   Procedure Laterality Date    CLOSED REDUCTION HUMERUS FRACTURE Right 11/29/2016    Procedure: CLOSED REDUCTION ARM/HUMERUS  CLOSED VS  OPEN SHOULDER REDUCTION;  Surgeon: Rodrigo Hyde MD;  Location: [facility];  Service:     CLOSED REDUCTION HUMERUS FRACTURE Right 4/13/2018    Procedure: CLOSED REDUCTION ARM/HUMERUS;  Surgeon: Rodrigo Hyde MD;  Location: [facility];  Service: Orthopedics    EYE SURGERY      bilateral CAT EXT W/IOLs    RECTAL PROLAPSE REPAIR      done 5-6 yrs ago       FAMILY HISTORY:  Non-contributory    SOCIAL HISTORY:  Social History     History   Alcohol Use No     History   Drug Use No     History   Smoking Status    Never Smoker   Smokeless Tobacco    Never Used       ALLERGIES:  No Known Allergies    MEDICATIONS:  All current active medications have been reviewed      Physical Exam     HR:  [69-90] 85  Resp:  [18] 18  BP: (116-141)/(58-77) 141/71  SpO2:  [94 %-100 %] 100 %  Temp (24hrs), Av °F (37 2 °C), Min:98 °F (36 7 °C), Max:99 7 °F (37 6 °C)  Current: Temperature: 98 °F (36 7 °C)    Intake/Output Summary (Last 24 hours) at 18 1317  Last data filed at 18 1241   Gross per 24 hour   Intake          1238 33 ml   Output              600 ml   Net           638 33 ml       Physical exam findings reported by bedside and primary medial team staff    General Appearance:  Appearing tired but nontoxic, and in no distress, appears stated age   Head:  Normocephalic, without obvious abnormality, atraumatic   Eyes:  PERRL, conjunctiva pink and sclera anicteric, both eyes   Nose: Nares normal, mucosa normal, no drainage   Throat: Oropharynx moist without lesions; lips, mucosa, and tongue normal; teeth and gums normal   Neck: Supple, symmetrical, trachea midline, no adenopathy, no tenderness/mass/nodules   Back:   Symmetric, no curvature, ROM normal, no CVA tenderness   Lungs:   Clear to auscultation bilaterally, no audible wheezes, rhonchi and rales, respirations unlabored   Chest Wall:  No tenderness or deformity   Heart:  Regular rate and rhythm, S1, S2 normal, no murmur, rub or gallop   Abdomen:   Soft, non-tender, non-distended, positive bowel sounds, no masses, no organomegaly    No CVA tenderness   Extremities: Large soft mass over anterior axillar and shoulder measuring about 11x15 cm in the right axilla which appears firm, mildly tender and hot to touch  Overlying skin is erythematous  Skin tear under right axilla with serous drainage of fluid  Skin: Skin color, texture, turgor normal, no rashes or lesions  No draining wounds noted  Lymph nodes: Cervical, supraclavicular, and axillary nodes normal   Neurologic: Alert and oriented times 3, extremity strength 5/5 and symmetric       Invasive Devices:   Peripheral IV 07/10/18 Left Hand (Active)   Site Assessment Clean;Dry; Intact 2018  7:00 AM   Dressing Type Transparent;Securing device 7/11/2018  7:00 AM   Line Status Infusing 7/11/2018  7:00 AM   Dressing Status Clean;Dry; Intact 7/11/2018  7:00 AM       Labs, Imaging, & Other Studies     Lab Results:    I have personally reviewed pertinent labs  Results from last 7 days  Lab Units 07/11/18  0512 07/10/18  0626 07/09/18  0549   WBC Thousand/uL 11 53* 12 80* 13 17*   HEMOGLOBIN g/dL 7 5* 8 5* 8 3*   PLATELETS Thousands/uL 117* 115* 109*       Results from last 7 days  Lab Units 07/11/18  0512 07/10/18  0626 07/09/18  0449   SODIUM mmol/L 134* 136 137   POTASSIUM mmol/L 3 6 3 9 4 1   CHLORIDE mmol/L 106 107 105   CO2 mmol/L 20* 21 23   ANION GAP mmol/L 8 8 9   BUN mg/dL 26* 32* 45*   CREATININE mg/dL 1 25 1 30 1 96*   EGFR ml/min/1 73sq m 40 39 23   GLUCOSE RANDOM mg/dL 115 128 120   CALCIUM mg/dL 7 8* 7 6* 8 0*   AST U/L 14 14 19   ALT U/L 14 15 15   ALK PHOS U/L 70 65 68   TOTAL PROTEIN g/dL 5 1* 5 3* 5 8*   BILIRUBIN TOTAL mg/dL 0 40 0 50 0 60       Results from last 7 days  Lab Units 07/08/18  2323   BLOOD CULTURE  Staphylococcus aureus*  Staphylococcus aureus*   GRAM STAIN RESULT  Gram positive cocci in clusters  Gram positive cocci in clusters       Imaging Studies:   I have personally reviewed pertinent imaging study reports and images in PACS  EKG, Pathology, and Other Studies:   I have personally reviewed pertinent reports  Counseling/Coordination of care:     Labs, medical tests and imaging studies were independently reviewed by me as noted above in HPI and old records were obtained and summarized as noted above in HPI

## 2018-07-11 NOTE — PHYSICAL THERAPY NOTE
PT treatment Note      07/11/18 1045   Restrictions/Precautions   Other Precautions (Bed Alarm; Chair Alarm; Fall Risk;Telemetry;Multiple lines)   Subjective   Subjective c/o L UE pain  OOB with encouragement   Bed Mobility   Additional Comments see Ot note for bed mobility   Transfers   Sit to Stand 3  Moderate assistance   Additional items Assist x 2; Increased time required; Bedrails;Verbal cues   Stand to Sit 3  Moderate assistance   Additional items Assist x 2;Armrests; Verbal cues   Stand pivot 3  Moderate assistance   Additional items Assist x 2   Ambulation/Elevation   Gait Assistance 3  Moderate assist   Additional items Assist x 2;Verbal cues   Assistive Device Rolling walker   Distance 5' bed to chair   Balance   Static Sitting Fair   Dynamic Sitting Fair   Static Standing [address] -  (with Rw)   Endurance Deficit   Endurance Deficit Yes   Activity Tolerance   Activity Tolerance Patient limited by fatigue;Patient limited by pain   Assessment   Prognosis Good   Problem List Decreased strength;Decreased endurance; Impaired balance;Decreased mobility; Decreased safety awareness; Impaired judgement;Pain   Assessment Requiring  increased assist  for functional mobility  c/o fatigue,weakness and RUE pain during session  required encouragement to particpate  She will benefit from continued PT to address her deficits  Plan   Treatment/Interventions Functional transfer training;LE strengthening/ROM; Therapeutic exercise; Endurance training;Bed mobility;Gait training   Progress Slow progress, decreased activity tolerance   Recommendation   Recommendation Short-term skilled PT   Pt  OOB with call bell within reach, scd's connected and turned on and alarm on at end of PT session

## 2018-07-11 NOTE — PROGRESS NOTES
Progress Note - Nephrology   Amber Jolly 82 y o  female MRN: [id number]  Unit/Bed#: 417-01 Encounter: [id number]    A/P:  1  Acute kidney injury: resolving with IVF  Fractional excretion of sodium was 0 08% indicating profound volume depletion  2  Hyponatremia: due to hypovolemia  7/11: has resolved with IVF  3  Metabolic alkalosis: responding to volume  4  Partial small bowel obstruction: improving and on clear liquids  5  Anemia: being followed with serial studies   7/11: check iron saturation  6  Gram positive bacteremia: on vancomycin    Follow up reason for today's visit: Acute kidney injury    Acute kidney injury [facility])    Patient Active Problem List   Diagnosis    Dislocation of shoulder region, right, initial encounter    Acute respiratory failure with hypoxia and hypercapnia (HCC)    Benign essential hypertension    GI bleed    Hyperlipidemia    Atrial fibrillation (HCC)    Anemia    Acute blood loss anemia    Vaginal prolapse    Elevated troponin    Hematochezia    Urinary retention    Rectal prolapse    Generalized weakness    Acute kidney injury ([address] )    Hyponatremia    Partial small bowel obstruction (HCC)    Hyperglycemia    Leukocytosis    Mass of right axilla    Gram-positive cocci bacteremia         Subjective:   Feels better today  Denies headaches dizziness chest pain shortness of breath abdominal pain nausea or vomiting  She has not had a bowel movement  But she is urinating well  Objective:     Vitals: Blood pressure 141/71, pulse 85, temperature 98 °F (36 7 °C), temperature source Temporal, resp  rate 18, height 4' 8" (1 422 m), weight 53 5 kg (117 lb 15 1 oz), SpO2 100 %  ,Body mass index is 26 44 kg/m²      Weight (last 2 days)     Date/Time   Weight    07/11/18 0534  53 5 (117 95)    07/10/18 0600  51 4 (113 32)    07/09/18 1002  49 6 (109 35)    07/09/18 0600  49 6 (109 35)                Intake/Output Summary (Last 24 hours) at 07/11/18 7175  Last data filed at 07/10/18 2203   Gross per 24 hour   Intake          1956 66 ml   Output              600 ml   Net          1356 66 ml     I/O last 3 completed shifts: In: 3146 7 [P O :420; I V :2726 7]  Out: 1100 [Urine:1100]         Physical Exam: /71 (BP Location: Left arm)   Pulse 85   Temp 98 °F (36 7 °C) (Temporal)   Resp 18   Ht 4' 8" (1 422 m) Comment: There appears to be some discrepancy in accurate ht  Per chart review:  4'2"  However, pt states 4'9" and pt's family reports 4'8"  Wt 53 5 kg (117 lb 15 1 oz)   SpO2 100%   BMI 26 44 kg/m²     General Appearance:    Alert, cooperative, no distress, appears stated age   Head:    Normocephalic, without obvious abnormality, atraumatic   Eyes:    Conjunctiva/corneas clear   Ears:    Normal external ears   Nose:   Nares normal, septum midline, mucosa normal, no drainage    or sinus tenderness   Throat:   Lips, mucosa, and tongue normal; teeth and gums normal   Neck:   Supple, symmetrical, trachea midline, no adenopathy;        thyroid:  No enlargement/tenderness/nodules; no carotid    bruit or JVD   Back:     Symmetric, no curvature, ROM normal, no CVA tenderness   Lungs:     Dec to auscultation bilaterally, respirations unlabored   Chest wall:    No tenderness or deformity   Heart:    Regular rate and rhythm, S1 and S2 normal, no murmur, rub   or gallop   Abdomen:     Soft, non-tender, bowel sounds present   Extremities:   Extremities normal, atraumatic, no cyanosis or edema   Skin:   Skin color, texture, turgor normal, no rashes or lesions   Lymph nodes:   Cervical normal   Neurologic:   CNII-XII intact            Lab, Imaging and other studies: I have personally reviewed pertinent labs    CBC:   Lab Results   Component Value Date    WBC 11 53 (H) 07/11/2018    HGB 7 5 (L) 07/11/2018    HCT 23 5 (L) 07/11/2018    MCV 93 07/11/2018     (L) 07/11/2018    MCH 29 8 07/11/2018    MCHC 31 9 07/11/2018    RDW 16 2 (H) 07/11/2018    MPV 11 1 07/11/2018     CMP: Lab Results   Component Value Date     (L) 07/11/2018    K 3 6 07/11/2018     07/11/2018    CO2 20 (L) 07/11/2018    ANIONGAP 8 07/11/2018    BUN 26 (H) 07/11/2018    CREATININE 1 25 07/11/2018    GLUCOSE 115 07/11/2018    CALCIUM 7 8 (L) 07/11/2018    AST 14 07/11/2018    ALT 14 07/11/2018    ALKPHOS 70 07/11/2018    PROT 5 1 (L) 07/11/2018    BILITOT 0 40 07/11/2018    EGFR 40 07/11/2018         Results from last 7 days  Lab Units 07/11/18  0512 07/10/18  0626 07/09/18  0449   SODIUM mmol/L 134* 136 137   POTASSIUM mmol/L 3 6 3 9 4 1   CHLORIDE mmol/L 106 107 105   CO2 mmol/L 20* 21 23   BUN mg/dL 26* 32* 45*   CREATININE mg/dL 1 25 1 30 1 96*   CALCIUM mg/dL 7 8* 7 6* 8 0*   TOTAL PROTEIN g/dL 5 1* 5 3* 5 8*   BILIRUBIN TOTAL mg/dL 0 40 0 50 0 60   ALK PHOS U/L 70 65 68   ALT U/L 14 15 15   AST U/L 14 14 19   GLUCOSE RANDOM mg/dL 115 128 120         Phosphorus: No results found for: PHOS  Magnesium:   No results found for: MG  Urinalysis: No results found for: COLORU, CLARITYU, SPECGRAV, PHUR, LEUKOCYTESUR, NITRITE, PROTEINUA, GLUCOSEU, KETONESU, BILIRUBINUR, BLOODU  Ionized Calcium: No results found for: CAION  Coagulation: No results found for: PT, INR, APTT  Troponin: No results found for: TROPONINI  ABG: No results found for: PHART, ZLQ6SCJ, PO2ART, KKK0ISG, Z0EPNGBR, BEART, SOURCE  Radiology review:     IMAGING  Procedure: Ct Chest Abdomen Pelvis Wo Contrast    Result Date: 7/8/2018  Narrative: CT CHEST, ABDOMEN AND PELVIS WITHOUT IV CONTRAST INDICATION:   Shortness of breath, abdominal pain, hypotensive, renal failure  COMPARISON: 4/12/2018  TECHNIQUE: CT examination of the chest, abdomen and pelvis was performed without intravenous contrast   Axial, sagittal, and coronal 2D reformatted images were created from the source data and submitted for interpretation  Radiation dose length product (DLP) for this visit:  281 88 mGy-cm     This examination, like all CT scans performed in the [facility]  [address], was performed utilizing techniques to minimize radiation dose exposure, including the use of iterative  reconstruction and automated exposure control  Enteric contrast was administered  FINDINGS: CHEST LUNGS:  Lungs are clear  There is no tracheal or endobronchial lesion  PLEURA:  Unremarkable  HEART/GREAT VESSELS:  Normal heart size  Coronary artery calcifications noted  Normal caliber thoracic aorta with mild atherosclerotic calcifications  MEDIASTINUM AND BABAR:  Unremarkable  CHEST WALL AND LOWER NECK:   Multiple chronic right rib fractures again identified  There is a fairly well-circumscribed 5 4 x 4 9 cm collection of simple fluid in the right axillary region, which may represent a seroma or resolving hematoma  ABDOMEN Absence of intravenous contrast enhancement limits evaluation of the solid abdominal viscera  LIVER/BILIARY TREE:  Unremarkable  GALLBLADDER:  No calcified gallstones  No pericholecystic inflammatory change  SPLEEN:  Unremarkable  PANCREAS:  Unremarkable  ADRENAL GLANDS:  Unremarkable  KIDNEYS/URETERS:  Unremarkable  No hydronephrosis  STOMACH AND BOWEL:  Several mildly dilated small bowel loops in the lower abdomen to the level of a herniated loop of bowel in a small right paramedian ventral hernia in the lower abdomen, concerning for early or partial small bowel obstruction  APPENDIX:  No findings to suggest appendicitis  ABDOMINOPELVIC CAVITY:  No ascites or free intraperitoneal air  No lymphadenopathy  VESSELS:  Unremarkable for patient's age  PELVIS REPRODUCTIVE ORGANS:  Unremarkable for patient's age  URINARY BLADDER:  Unremarkable  ABDOMINAL WALL/INGUINAL REGIONS:  Small right paramedian ventral hernia in the lower abdomen containing a loop of small bowel, as above  This is new since the prior exam  OSSEOUS STRUCTURES:  No acute fracture or destructive osseous lesion    Chronic fractures of the right ribs, bilateral sacral ala, and left pubic rami appear unchanged  Chronic right shoulder dislocation appears unchanged  Unchanged vertebral wedge deformity of L2  Impression: Findings concerning for early or partial small bowel obstruction related to a herniated small bowel loop within a right lower quadrant ventral hernia  5 4 cm fluid collection in the right axilla, new since the prior study  This may represent a seroma or resolving hematoma  Multiple chronic right rib and pelvic fractures  Chronic right shoulder dislocation also again identified  I personally discussed this study with Joana Bobo on 7/8/2018 at 3:37 PM  Workstation performed: [id number]     Procedure: Us Kidney And Bladder    Result Date: 7/9/2018  Narrative: RENAL ULTRASOUND INDICATION:   acute renal failure  COMPARISON: None TECHNIQUE:   Ultrasound of the retroperitoneum was performed with a curvilinear transducer utilizing volumetric sweeps and still imaging techniques  FINDINGS: KIDNEYS: Symmetric and normal size  Right kidney:  10 3 x 4 3 cm  Left kidney:  9 5 x 4 9 cm  Right kidney Normal echogenicity and contour  No suspicious masses detected  Multiple simple cortical cysts (largest 1 3 cm)  No hydronephrosis  No shadowing calculi  Small right perirenal fluid collection  Left kidney Normal echogenicity and contour  No suspicious masses detected  No hydronephrosis  No shadowing calculi  No perinephric fluid collections  URETERS: Nonvisualized  BLADDER: Normally distended  No focal thickening or mass lesions  Left ureteral jet detected  Impression: Negative for evidence of obstructive uropathy  Small right perinephric fluid collection, nonspecific (but may be related to infection)  Workstation performed: [id number]     Procedure: Us Extremity Soft Tissue    Result Date: 7/9/2018  Narrative: UPPER EXTREMITY ULTRASOUND INDICATION:  Right axillary lump COMPARISON:  None  FINDINGS: Ultrasound evaluation of the right axilla performed to evaluate lump   A 5 6 x 4 9 x 5 7 cm flex, primarily cystic lesion with irregular borders, internal debris and enhanced through transmission is seen in the right axilla at the site of clinical concern  Impression: Complex right axillary lesion likely a abscess or complicated cyst/hematoma  Correlation with the clinical findings recommended in this regard and, if warranted, short interval follow-up following appropriate clinical therapy suggested  Workstation performed: [id number]       Current Facility-Administered Medications   Medication Dose Route Frequency    acetaminophen (TYLENOL) tablet 650 mg  650 mg Oral Q6H PRN    atorvastatin (LIPITOR) tablet 20 mg  20 mg Oral Daily With Dinner    doxazosin (CARDURA) tablet 4 mg  4 mg Oral HS    hydrocortisone (ANUSOL-HC, PROCTOSOL HC)) 2 5 % rectal cream   Rectal BID    menthol-zinc oxide (CALMOSEPTINE) 0 44-20 6 % ointment   Topical BID    metoprolol tartrate (LOPRESSOR) tablet 50 mg  50 mg Oral Q12H [address]    ondansetron (ZOFRAN) injection 4 mg  4 mg Intravenous Q6H PRN    oxybutynin (DITROPAN) tablet 5 mg  5 mg Oral Daily    pantoprazole (PROTONIX) EC tablet 40 mg  40 mg Oral BID AC    sodium chloride 0 9 % infusion  75 mL/hr Intravenous Continuous    vancomycin (VANCOCIN) 750 mg in sodium chloride 0 9 % 250 mL IVPB  15 mg/kg Intravenous Q24H     Medications Discontinued During This Encounter   Medication Reason    sodium chloride 0 9 % infusion     losartan potassium-hydrochlorothiazide (HYZAAR 100/12  5) combo dose     hydrocortisone-pramoxine (ANALPRAM-HC) 2 5-1 % rectal cream     vancomycin (VANCOCIN) 750 mg in sodium chloride 0 9 % 250 mL IVPB        White Salmon, MD

## 2018-07-12 PROBLEM — R22.31 MASS OF RIGHT AXILLA: Status: RESOLVED | Noted: 2018-01-01 | Resolved: 2018-01-01

## 2018-07-12 PROBLEM — L02.411 ABSCESS OF AXILLA, RIGHT: Status: ACTIVE | Noted: 2018-01-01

## 2018-07-12 NOTE — PROGRESS NOTES
Progress Note - General Surgery   Donna Morales 82 y o  female MRN: [id number]  Unit/Bed#: 417-01 Encounter: [id number]    Assessment:  Mass right axilla, need to rule out drainable abscess  Suspect hematoma formation  Patient has refused further attempts for venapuncture to obtain today's morning blood work  Leukocytosis yesterday 11 53  Patient has 2 positive blood cultures for Staph aureus  Rectal prolapse, reduced yesterday after bowel movement   Partial small bowel obstruction, resolved  Reducible incisional hernia  Acute blood loss anemia, hemoglobin yesterday was 7 5  Plan:  Patient needs CT scan of right shoulder to rule out right axillary abscess performed today  Maintain current antibiotics  Dr Keiko Spangler will examine the patient later today  If there is drainable abscess in the right axillary mass, then the patient likely would need surgical incision and drainage  In that event will make the patient nothing by mouth right now until results of CT scan are known hopefully later this morning  Subjective/Objective   Chief Complaint:  Patient refused blood work this morning  Two attempts were made by the nursing staff  She is scheduled to undergo CT scan of the right shoulder to rule out any abscess formation in the large mass in her right axilla  Subjective:  She is sitting up in bed  Patient has complaints that the large mass in her right axilla is tender  She was to have morning blood work drawn today in 2 attempts were made that were unsuccessful  There is no report of any fever  She had a leukocytosis of 11 53 yesterday  She is found have 2 positive blood cultures for Staph aureus  Patient has not had any rectal bleeding since she moved her bowels yesterday and had manual reduction of a rectocele by her nurse at bedside  The patient currently remains on IV vancomycin  She provides limited verbal response to questions  Little history is obtained from the patient      Review of the patient's past medical records find that the patient had previous rectal prolapse repair done 5 or 6 years ago  Scheduled Meds:  Current Facility-Administered Medications:  acetaminophen 650 mg Oral Q6H PRN Arpit Vickers PA-C    atorvastatin 20 mg Oral Daily With Dinner Arpit Vickers PA-C    doxazosin 4 mg Oral HS Arpit Vickers PA-C    hydrocortisone  Rectal BID Arpit Vickers PA-C    menthol-zinc oxide  Topical BID Sophia Duran MD    metoprolol tartrate 50 mg Oral Q12H [address] Arpit Vickers PA-C    ondansetron 4 mg Intravenous Q6H PRN Arpit Vickers PA-C    oxybutynin 5 mg Oral Daily Arpit Vickers PA-C    pantoprazole 40 mg Oral BID AC Arpit Vickers PA-C    senna-docusate sodium 1 tablet Oral BID Finn Alvarez MD    sodium chloride 75 mL/hr Intravenous Continuous Finn Alvarez MD Last Rate: 75 mL/hr (07/12/18 0257)   vancomycin 15 mg/kg Intravenous Q24H Finn Alvarez MD Last Rate: 750 mg (07/11/18 2037)     Continuous Infusions:  sodium chloride 75 mL/hr Last Rate: 75 mL/hr (07/12/18 0257)     PRN Meds:   acetaminophen    ondansetron      Objective:  Patient is awake  No apparent distress  Sitting up in bed  Patient has a large 11 x 15 cm oval semi mobile mass in the right axilla with some superficial skin sloughing with mild erythema which does not brianna with palpation  The area is tender to touch but not frankly warm  No axillary nodes or other masses are palpable  Breast exam does not find any palpable masses with atrophic breasts bilaterally  Chest is thin  Heart regular rate and rhythm  Lungs essentially clear  No rales or rhonchi  No calf tenderness or peripheral edema  Blood pressure 134/60, pulse 80, temperature 98 2 °F (36 8 °C), temperature source Temporal, resp  rate 20, height 4' 8" (1 422 m), weight 52 kg (114 lb 10 2 oz), SpO2 (!) 87 %  ,Body mass index is 25 7 kg/m²        Intake/Output Summary (Last 24 hours) at 07/12/18 0813  Last data filed at 07/12/18 0812   Gross per 24 hour   Intake 1640 ml   Output              800 ml   Net              840 ml       Invasive Devices     Peripheral Intravenous Line            Peripheral IV 07/10/18 Left Hand 1 day          Drain            Urethral Catheter Double-lumen 16 Fr  less than 1 day                Lab, Imaging and other studies:I have personally reviewed pertinent lab results  VTE Pharmacologic Prophylaxis: Reason for no pharmacologic prophylaxis Contraindicated because of her ongoing anemia  VTE Mechanical Prophylaxis: sequential compression device     Time spent by this provider was 20 min today including examination of the patient as well as review of historical documentation, overnight events, and yesterday he has laboratory values  Discussion with the attending hospitalist physician was also conducted this morning  Patient will be examined later today by Dr Anand Amaral, consulting general surgeon      Betty Zepeda PA-C

## 2018-07-12 NOTE — CASE MANAGEMENT
Continued Stay Review    Date: 7/12/18    Vital Signs: /63 (BP Location: Left arm)   Pulse 83   Temp 98 8 °F (37 1 °C) (Temporal)   Resp 18   Ht 4' 8" (1 422 m) Comment: There appears to be some discrepancy in accurate ht  Per chart review:  4'2"  However, pt states 4'9" and pt's family reports 4'8"  Wt 52 kg (114 lb 10 2 oz)   SpO2 97%   BMI 25 70 kg/m²     Medications:   Scheduled Meds:   Current Facility-Administered Medications:  acetaminophen 650 mg Oral Q6H PRN Arpit Vickers PA-C   atorvastatin 20 mg Oral Daily With Dinner Arpit Vickers PA-C   cefazolin 2,000 mg Intravenous Q8H Abdiel Gonzales MD   doxazosin 4 mg Oral HS Arpit Vickers PA-C   hydrocortisone  Rectal BID Arpit Vickers PA-C   menthol-zinc oxide  Topical BID Sophia Duran MD   metoprolol tartrate 50 mg Oral Q12H [address] Arpit Vickers PA-C   ondansetron 4 mg Intravenous Q6H PRN Arpit Vickers PA-C   oxybutynin 5 mg Oral Daily Arpit Vickers PA-C   pantoprazole 40 mg Oral BID AC Arpit Vickers PA-C   senna-docusate sodium 1 tablet Oral BID Abdiel Gonzales MD     Continuous Infusions:    PRN Meds:   acetaminophen    ondansetron    Abnormal Labs/Diagnostic Results:   Age/Sex: 82 y o  female     Assessment/Plan:   PER SURG:  Mass right axilla, need to rule out drainable abscess  Suspect hematoma formation  Patient has refused further attempts for venapuncture to obtain today's morning blood work      Leukocytosis yesterday 11 53   Patient has 2 positive blood cultures for Staph aureus  Rectal prolapse, reduced yesterday after bowel movement   Partial small bowel obstruction, resolved  Reducible incisional hernia  Acute blood loss anemia, hemoglobin yesterday was 7 5        Plan:  Patient needs CT scan of right shoulder to rule out right axillary abscess performed today  Maintain current antibiotics  Dr Valri Crigler will examine the patient later today    If there is drainable abscess in the right axillary mass, then the patient likely would need surgical incision and drainage  In that event will make the patient nothing by mouth right now until results of CT scan are known hopefully later this morning    PER RENAL:  1  Acute kidney injury: resolving with IVF  Fractional excretion of sodium was 0 08% indicating profound volume depletion  2  Hyponatremia: due to hypovolemia  7/11: has resolved with IVF  7/12: kidney function at her baseline  Serum sodium slightly reduced - continue to monitor  3  Metabolic alkalosis: responding to volume  4  Partial small bowel obstruction: improving and on clear liquids  5  Anemia: being followed with serial studies   7/11: check iron saturation  6  Gram positive bacteremia: on vancomycin  Follow up reason for today's visit: Acute kidney injury    PER WOUND CARE  Assessment:   Right buttock stage 2 pressure injury-acquired  Right axilla-followed by surgery  Ochoa catheter in place  Patient with difficulty repositioning self secondary to right sided pain  OOB to chair at times  Albumin-1 7  Christiano- 16     Plan:   1  Accumax air mattress with air pump  2   Soft care cushion when OOB to chair  3   Reposition patient off of affected side per patient tolerance  4   Calmoseptine to right buttock-Apply q shift-discontinue when healed    5   Hydroguard to left buttock-Apply q shift  Discharge Plan: TBD

## 2018-07-12 NOTE — PROGRESS NOTES
Attempted to obtain blood work on patient two times  Attempts were unsuccessful and patient refused further attempts to let us obtain bloodwork  Education was provided to patient on why morning bloodwork is important during her hospital stay  Patient agreed and understood but still refused to let hospital staff retry to obtain morning lab work  Dr Arpit Campbell notified

## 2018-07-12 NOTE — ANESTHESIA PREPROCEDURE EVALUATION
Review of Systems/Medical History  Patient summary reviewed    No history of anesthetic complications     Cardiovascular  Exercise tolerance (METS): <4,  Hyperlipidemia, Hypertension ,   Comment: SUMMARY     LEFT VENTRICLE:  Systolic function was normal  Ejection fraction was estimated to be 65 %  There were no regional wall motion abnormalities  Wall thickness was mildly increased  There was mild concentric hypertrophy  Doppler parameters were consistent with abnormal left ventricular relaxation (grade 1 diastolic dysfunction)      RIGHT VENTRICLE:  The size was normal   Systolic function was normal      MITRAL VALVE:  There was mild annular calcification  There was moderate regurgitation      AORTIC VALVE:  Transaortic velocity was increased due to valvular stenosis  There was mild stenosis  There was mild to moderate regurgitation  Valve mean gradient was 13 mmHg  Aortic valve area was 1 7 cm squared by the continuity equation      TRICUSPID VALVE:  There was moderate regurgitation  Estimated peak PA pressure was 43 mmHg  The findings suggest mild pulmonary hypertension,  Pulmonary  Asthma ,        GI/Hepatic            Endo/Other     GYN       Hematology  Anemia ,     Musculoskeletal    Arthritis     Neurology    CVA , residual symptoms,    Psychology     Dementia           Physical Exam    Airway    Mallampati score: II  TM Distance: <3 FB  Neck ROM: limited     Dental   Comment: Poor dental condition,     Cardiovascular  Rhythm: regular, Rate: normal,     Pulmonary  Comment: Albuterol 2 puffs given, Wheezes,     Other Findings        Anesthesia Plan  ASA Score- 3 Emergent    Anesthesia Type- IV sedation with anesthesia with ASA Monitors  Additional Monitors:   Airway Plan:         Plan Factors-  Patient did not smoke on day of surgery  Induction- intravenous      Postoperative Plan-     Informed Consent- Anesthetic plan and risks discussed with patient and son (the consent was gotten from her son throuth the phone)  I personally reviewed this patient with the CRNA  Discussed and agreed on the Anesthesia Plan with the CRNA  Joana Paul

## 2018-07-12 NOTE — SOCIAL WORK
Continuing to follow pt  Pt going to the OR today for drainage of right axilla  Will continue to follow and assist in dc planning

## 2018-07-12 NOTE — PROGRESS NOTES
[facility] Progress Note  Patient: Milvia Mustafa 82 y o  female   MRN: [id number]  PCP: Rosalino Valiente DO  Unit/Bed#: 710-84 Encounter: [id number]  Date Of Visit: 07/12/18    Assessment:    Principal Problem:    Bacteremia due to methicillin susceptible Staphylococcus aureus (MSSA)  Active Problems:    Benign essential hypertension    Hyperlipidemia    Acute blood loss anemia    Rectal prolapse    Generalized weakness    Acute kidney injury ([address] )    Hyponatremia    Partial small bowel obstruction (HCC)    Hyperglycemia    Leukocytosis    Mass of right axilla    Pressure ulcer of sacral region, stage 2      Plan:    · MSSA bacteremia - source most likely R axilla abscess/infected hematoma, IE less likely, would require PICC placement once cultures clear for 2-4 weeks of IV cefazolin 2g IV q8h  · R axilla abscess - suspect antecedent hematoma infection  · Acute on chronic anemia - suspect chronic iron deficiency anemia with ABLA due to hemotochezia, UA unremarkable  · Rectal prolapse - likely source of occult chronic GI bleed  · NANETTE - resolving, baseline eGFR 51 4/13/18  · Hyponatremia - POA, resolved with IV fluids  · HTN - controlled with beta blocker, diuretic on hold, resume ARB in AM if BP allows  · Generalized weakness - ultimately may require SNF placement for Rehab    -OR this afternoon for abscess I&D  -Surveillance bld cx in 2-3 days, PICC placement if clear, otherwise would require MARTINEZ to exclude IE  -IV antibiotic switched from vanco to cefazolin in light of bld cx 7/8/18 results  -stop IV fluid to avoid vol overload, monitor renal fxn  -PRBC transfusion PRN Hb <7, start PO iron supplement with vit C to aid its absorption      VTE Pharmacologic Prophylaxis:   Pharmacologic: Pharmacologic VTE Prophylaxis contraindicated due to ABLA  Mechanical VTE Prophylaxis in Place: Yes    Patient Centered Rounds: I have performed bedside rounds with nursing staff today      Current Length of Stay: 4 day(s)    Current Patient Status: Inpatient     Code Status: Level 1 - Full Code      Subjective:   Patient seen & examined this AM prior to OR  R axilla mass same, no fever or chills, feels weak still    Objective:     Vitals:   Temp (24hrs), Av 4 °F (36 9 °C), Min:97 6 °F (36 4 °C), Max:99 °F (37 2 °C)    HR:  [66-89] 83  Resp:  [16-21] 18  BP: (132-162)/(56-72) 144/63  SpO2:  [87 %-100 %] 97 %  Body mass index is 25 7 kg/m²  Input and Output Summary (last 24 hours): Intake/Output Summary (Last 24 hours) at 18 1523  Last data filed at 18 1439   Gross per 24 hour   Intake           2064 5 ml   Output              645 ml   Net           1419 5 ml       Physical Exam:   General: in no overt distress  HEENT: oral mucosa moist, not pale, not jaundiced  Skin: softball sized firm exquisitely tender semi-mobile erythematous mass R anterior axilla  Psych: appropriately oriented in all spheres  Neuro: Awake, alert, essentially nonfocal      Additional Data:     Labs:      Results from last 7 days  Lab Units 18  0512 07/10/18  0626   WBC Thousand/uL 11 53* 12 80*   HEMOGLOBIN g/dL 7 5* 8 5*   HEMATOCRIT % 23 5* 26 4*   PLATELETS Thousands/uL 117* 115*   LYMPHO PCT %  --  5*   MONO PCT MAN %  --  1*   EOSINO PCT MANUAL %  --  0       Results from last 7 days  Lab Units 18  0512   SODIUM mmol/L 134*   POTASSIUM mmol/L 3 6   CHLORIDE mmol/L 106   CO2 mmol/L 20*   BUN mg/dL 26*   CREATININE mg/dL 1 25   CALCIUM mg/dL 7 8*   TOTAL PROTEIN g/dL 5 1*   BILIRUBIN TOTAL mg/dL 0 40   ALK PHOS U/L 70   ALT U/L 14   AST U/L 14   GLUCOSE RANDOM mg/dL 115       Results from last 7 days  Lab Units 18  0449   INR  1 11     Invalid input(s): TROIP    * I Have Reviewed All Lab Data Listed Above  * Additional Pertinent Lab Tests Reviewed:  All Labs Within Last 24 Hours Reviewed      Recent Cultures (last 7 days):       Results from last 7 days  Lab Units 18  5515   BLOOD CULTURE  Staphylococcus aureus*  Staphylococcus aureus*   GRAM STAIN RESULT  Gram positive cocci in clusters  Gram positive cocci in clusters       Last 24 Hours Medication List:     Current Facility-Administered Medications:  acetaminophen 650 mg Oral Q6H PRN Arpit Vickers PA-C   atorvastatin 20 mg Oral Daily With Dinner Arpit Vickers PA-C   cefazolin 2,000 mg Intravenous Q8H Veronique Mcfarlane MD   doxazosin 4 mg Oral HS Arpit Vickers PA-C   hydrocortisone  Rectal BID Arpit Vickers PA-C   menthol-zinc oxide  Topical BID Sophia Duran MD   metoprolol tartrate 50 mg Oral Q12H [address] Arpit Vickers PA-C   ondansetron 4 mg Intravenous Q6H PRN Arpit Vickers PA-C   oxybutynin 5 mg Oral Daily Arpit Vickers PA-C   pantoprazole 40 mg Oral BID AC Arpit Vickers PA-C   senna-docusate sodium 1 tablet Oral BID Veronique Mcfarlane MD        Today, Patient Was Seen By: Veronique Mcfarlane MD    ** Please Note: Dragon 360 Dictation voice to text software may have been used in the creation of this document   **

## 2018-07-12 NOTE — PROGRESS NOTES
Progress Note - Nephrology   Pao Wilson 82 y o  female MRN: [id number]  Unit/Bed#: 417-01 Encounter: [id number]    A/P:  1  Acute kidney injury: resolving with IVF  Fractional excretion of sodium was 0 08% indicating profound volume depletion  2  Hyponatremia: due to hypovolemia  7/11: has resolved with IVF  7/12: kidney function at her baseline  Serum sodium slightly reduced - continue to monitor  3  Metabolic alkalosis: responding to volume  4  Partial small bowel obstruction: improving and on clear liquids  5  Anemia: being followed with serial studies   7/11: check iron saturation  6  Gram positive bacteremia: on vancomycin    Follow up reason for today's visit: Acute kidney injury    Staphylococcus aureus bacteremia    Patient Active Problem List   Diagnosis    Dislocation of shoulder region, right, initial encounter    Acute respiratory failure with hypoxia and hypercapnia (HCC)    Benign essential hypertension    GI bleed    Hyperlipidemia    Atrial fibrillation (HCC)    Anemia    Acute blood loss anemia    Vaginal prolapse    Elevated troponin    Hematochezia    Urinary retention    Rectal prolapse    Generalized weakness    Acute kidney injury (HCC)    Hyponatremia    Partial small bowel obstruction (HCC)    Hyperglycemia    Leukocytosis    Mass of right axilla    Staphylococcus aureus bacteremia    Pressure ulcer of sacral region, stage 2         Subjective:   Feels better today  Denies headaches dizziness chest pain shortness of breath abdominal pain nausea or vomiting  She has not had a bowel movement  But she is urinating well  Objective:     Vitals: Blood pressure 134/60, pulse 80, temperature 98 2 °F (36 8 °C), temperature source Temporal, resp  rate 20, height 4' 8" (1 422 m), weight 52 kg (114 lb 10 2 oz), SpO2 (!) 87 %  ,Body mass index is 25 7 kg/m²      Weight (last 2 days)     Date/Time   Weight    07/12/18 0600  52 (114 64)    07/11/18 0534  53 5 (117 95) 07/10/18 0600  51 4 (113 32)                Intake/Output Summary (Last 24 hours) at 07/12/18 0904  Last data filed at 07/12/18 0812   Gross per 24 hour   Intake             1400 ml   Output              800 ml   Net              600 ml     I/O last 3 completed shifts: In: 2218 3 [P O :480; I V :1738 3]  Out: 1400 [Urine:1400]         Physical Exam: /60 (BP Location: Left arm)   Pulse 80   Temp 98 2 °F (36 8 °C) (Temporal)   Resp 20   Ht 4' 8" (1 422 m) Comment: There appears to be some discrepancy in accurate ht  Per chart review:  4'2"  However, pt states 4'9" and pt's family reports 4'8"  Wt 52 kg (114 lb 10 2 oz)   SpO2 (!) 87%   BMI 25 70 kg/m²     General Appearance:    Alert, cooperative, no distress, appears stated age   Head:    Normocephalic, without obvious abnormality, atraumatic   Eyes:    Conjunctiva/corneas clear   Ears:    Normal external ears   Nose:   Nares normal, septum midline, mucosa normal, no drainage    or sinus tenderness   Throat:   Lips, mucosa, and tongue normal; teeth and gums normal   Neck:   Supple, symmetrical, trachea midline, no adenopathy;        thyroid:  No enlargement/tenderness/nodules; no carotid    bruit or JVD   Back:     Symmetric, no curvature, ROM normal, no CVA tenderness   Lungs:     Dec to auscultation bilaterally, respirations unlabored   Chest wall:    No tenderness or deformity   Heart:    Regular rate and rhythm, S1 and S2 normal, no murmur, rub   or gallop   Abdomen:     Soft, non-tender, bowel sounds present   Extremities:   Extremities normal, atraumatic, no cyanosis or edema   Skin:   Skin color, texture, turgor normal, no rashes or lesions   Lymph nodes:   Cervical normal   Neurologic:   CNII-XII intact            Lab, Imaging and other studies: I have personally reviewed pertinent labs    CBC:   No results found for: WBC, HGB, HCT, MCV, PLT, ADJUSTEDWBC, MCH, MCHC, RDW, MPV, NRBC  CMP:   No results found for: NA, K, CL, CO2, ANIONGAP, BUN, CREATININE, GLUCOSE, CALCIUM, AST, ALT, ALKPHOS, PROT, ALBUMIN, BILITOT, EGFR      Results from last 7 days  Lab Units 07/11/18  0512 07/10/18  0626 07/09/18  0449   SODIUM mmol/L 134* 136 137   POTASSIUM mmol/L 3 6 3 9 4 1   CHLORIDE mmol/L 106 107 105   CO2 mmol/L 20* 21 23   BUN mg/dL 26* 32* 45*   CREATININE mg/dL 1 25 1 30 1 96*   CALCIUM mg/dL 7 8* 7 6* 8 0*   TOTAL PROTEIN g/dL 5 1* 5 3* 5 8*   BILIRUBIN TOTAL mg/dL 0 40 0 50 0 60   ALK PHOS U/L 70 65 68   ALT U/L 14 15 15   AST U/L 14 14 19   GLUCOSE RANDOM mg/dL 115 128 120         Phosphorus: No results found for: PHOS  Magnesium:   No results found for: MG  Urinalysis: No results found for: COLORU, CLARITYU, SPECGRAV, PHUR, LEUKOCYTESUR, NITRITE, PROTEINUA, GLUCOSEU, KETONESU, BILIRUBINUR, BLOODU  Ionized Calcium: No results found for: CAION  Coagulation: No results found for: PT, INR, APTT  Troponin: No results found for: TROPONINI  ABG: No results found for: PHART, FSA9FUJ, PO2ART, ZOK1BFQ, E6ETDSCQ, BEART, SOURCE  Radiology review:     IMAGING  Procedure: Ct Chest Abdomen Pelvis Wo Contrast    Result Date: 7/8/2018  Narrative: CT CHEST, ABDOMEN AND PELVIS WITHOUT IV CONTRAST INDICATION:   Shortness of breath, abdominal pain, hypotensive, renal failure  COMPARISON: 4/12/2018  TECHNIQUE: CT examination of the chest, abdomen and pelvis was performed without intravenous contrast   Axial, sagittal, and coronal 2D reformatted images were created from the source data and submitted for interpretation  Radiation dose length product (DLP) for this visit:  281 88 mGy-cm   This examination, like all CT scans performed in the [facility], was performed utilizing techniques to minimize radiation dose exposure, including the use of iterative  reconstruction and automated exposure control  Enteric contrast was administered  FINDINGS: CHEST LUNGS:  Lungs are clear  There is no tracheal or endobronchial lesion  PLEURA:  Unremarkable   HEART/GREAT VESSELS:  Normal heart size  Coronary artery calcifications noted  Normal caliber thoracic aorta with mild atherosclerotic calcifications  MEDIASTINUM AND BABAR:  Unremarkable  CHEST WALL AND LOWER NECK:   Multiple chronic right rib fractures again identified  There is a fairly well-circumscribed 5 4 x 4 9 cm collection of simple fluid in the right axillary region, which may represent a seroma or resolving hematoma  ABDOMEN Absence of intravenous contrast enhancement limits evaluation of the solid abdominal viscera  LIVER/BILIARY TREE:  Unremarkable  GALLBLADDER:  No calcified gallstones  No pericholecystic inflammatory change  SPLEEN:  Unremarkable  PANCREAS:  Unremarkable  ADRENAL GLANDS:  Unremarkable  KIDNEYS/URETERS:  Unremarkable  No hydronephrosis  STOMACH AND BOWEL:  Several mildly dilated small bowel loops in the lower abdomen to the level of a herniated loop of bowel in a small right paramedian ventral hernia in the lower abdomen, concerning for early or partial small bowel obstruction  APPENDIX:  No findings to suggest appendicitis  ABDOMINOPELVIC CAVITY:  No ascites or free intraperitoneal air  No lymphadenopathy  VESSELS:  Unremarkable for patient's age  PELVIS REPRODUCTIVE ORGANS:  Unremarkable for patient's age  URINARY BLADDER:  Unremarkable  ABDOMINAL WALL/INGUINAL REGIONS:  Small right paramedian ventral hernia in the lower abdomen containing a loop of small bowel, as above  This is new since the prior exam  OSSEOUS STRUCTURES:  No acute fracture or destructive osseous lesion  Chronic fractures of the right ribs, bilateral sacral ala, and left pubic rami appear unchanged  Chronic right shoulder dislocation appears unchanged  Unchanged vertebral wedge deformity of L2  Impression: Findings concerning for early or partial small bowel obstruction related to a herniated small bowel loop within a right lower quadrant ventral hernia   5 4 cm fluid collection in the right axilla, new since the prior study  This may represent a seroma or resolving hematoma  Multiple chronic right rib and pelvic fractures  Chronic right shoulder dislocation also again identified  I personally discussed this study with Fabi Pierce on 7/8/2018 at 3:37 PM  Workstation performed: [id number]     Procedure: Us Kidney And Bladder    Result Date: 7/9/2018  Narrative: RENAL ULTRASOUND INDICATION:   acute renal failure  COMPARISON: None TECHNIQUE:   Ultrasound of the retroperitoneum was performed with a curvilinear transducer utilizing volumetric sweeps and still imaging techniques  FINDINGS: KIDNEYS: Symmetric and normal size  Right kidney:  10 3 x 4 3 cm  Left kidney:  9 5 x 4 9 cm  Right kidney Normal echogenicity and contour  No suspicious masses detected  Multiple simple cortical cysts (largest 1 3 cm)  No hydronephrosis  No shadowing calculi  Small right perirenal fluid collection  Left kidney Normal echogenicity and contour  No suspicious masses detected  No hydronephrosis  No shadowing calculi  No perinephric fluid collections  URETERS: Nonvisualized  BLADDER: Normally distended  No focal thickening or mass lesions  Left ureteral jet detected  Impression: Negative for evidence of obstructive uropathy  Small right perinephric fluid collection, nonspecific (but may be related to infection)  Workstation performed: [id number]     Procedure: Us Extremity Soft Tissue    Result Date: 7/9/2018  Narrative: UPPER EXTREMITY ULTRASOUND INDICATION:  Right axillary lump COMPARISON:  None  FINDINGS: Ultrasound evaluation of the right axilla performed to evaluate lump  A 5 6 x 4 9 x 5 7 cm flex, primarily cystic lesion with irregular borders, internal debris and enhanced through transmission is seen in the right axilla at the site of clinical concern  Impression: Complex right axillary lesion likely a abscess or complicated cyst/hematoma    Correlation with the clinical findings recommended in this regard and, if warranted, short interval follow-up following appropriate clinical therapy suggested  Workstation performed: [id number]       Current Facility-Administered Medications   Medication Dose Route Frequency    acetaminophen (TYLENOL) tablet 650 mg  650 mg Oral Q6H PRN    atorvastatin (LIPITOR) tablet 20 mg  20 mg Oral Daily With Dinner    doxazosin (CARDURA) tablet 4 mg  4 mg Oral HS    hydrocortisone (ANUSOL-HC, PROCTOSOL HC)) 2 5 % rectal cream   Rectal BID    menthol-zinc oxide (CALMOSEPTINE) 0 44-20 6 % ointment   Topical BID    metoprolol tartrate (LOPRESSOR) tablet 50 mg  50 mg Oral Q12H [address]    ondansetron (ZOFRAN) injection 4 mg  4 mg Intravenous Q6H PRN    oxybutynin (DITROPAN) tablet 5 mg  5 mg Oral Daily    pantoprazole (PROTONIX) EC tablet 40 mg  40 mg Oral BID AC    senna-docusate sodium (SENOKOT S) 8 6-50 mg per tablet 1 tablet  1 tablet Oral BID    sodium chloride 0 9 % infusion  75 mL/hr Intravenous Continuous    vancomycin (VANCOCIN) 750 mg in sodium chloride 0 9 % 250 mL IVPB  15 mg/kg Intravenous Q24H     Medications Discontinued During This Encounter   Medication Reason    sodium chloride 0 9 % infusion     losartan potassium-hydrochlorothiazide (HYZAAR 100/12  5) combo dose     hydrocortisone-pramoxine (ANALPRAM-HC) 2 5-1 % rectal cream     vancomycin (VANCOCIN) 750 mg in sodium chloride 0 9 % 250 mL IVPB        Jorge Dennison MD

## 2018-07-12 NOTE — CONSULTS
Progress Note - Wound   Delmi Jaquez 82 y o  female MRN: [id number]  Unit/Bed#: 417-01 Encounter: [id number]      Assessment:   Right buttock stage 2 pressure injury-acquired  Right axilla-followed by surgery  Ochoa catheter in place  Patient with difficulty repositioning self secondary to right sided pain  OOB to chair at times  Albumin-1 7  Christiano- 16     Plan:   1  Accumax air mattress with air pump  2   Soft care cushion when OOB to chair  3   Reposition patient off of affected side per patient tolerance  4   Calmoseptine to right buttock-Apply q shift-discontinue when healed  5   Hydroguard to left buttock-Apply q shift    Vitals: Blood pressure 134/60, pulse 80, temperature 98 2 °F (36 8 °C), temperature source Temporal, resp  rate 20, height 4' 8" (1 422 m), weight 52 kg (114 lb 10 2 oz), SpO2 (!) 87 %  ,Body mass index is 25 7 kg/m²  Pressure Ulcer 07/12/18 Buttocks Right partial thickness, stage 2 PI (Active)   Staging Stage II 7/11/2018 11:59 PM   Wound Description Epithelialization;Pink; Other (dermal) 7/11/2018 11:59 PM   Naomi-wound Assessment Denuded;Erythema-blanchable 7/11/2018 11:59 PM   Shape oval 7/11/2018 11:59 PM   Wound Length (cm) 1 5 cm 7/11/2018 11:59 PM   Wound Width (cm) 1 5 cm 7/11/2018 11:59 PM   Wound Depth (cm) 0 1 7/11/2018 11:59 PM   Calculated Wound Area (cm^2) 2 25 cm^2 7/11/2018 11:59 PM   Calculated Wound Volume (cm^3) 0 22 cm^3 7/11/2018 11:59 PM   Drainage Amount None 7/11/2018 11:59 PM   Treatment Turn & reposition; Other (Comment) 7/11/2018 11:59 PM   Dressing Protective barrier 7/11/2018 11:59 PM   Dressing Changed New dressing applied 7/11/2018 11:59 PM   Patient Tolerance Tolerated well 7/11/2018 11:59 PM

## 2018-07-12 NOTE — PROGRESS NOTES
CT scan of right shoulder not done yet, radiology wants to confirm whether right shoulder or chest CT should be done

## 2018-07-12 NOTE — ANESTHESIA POSTPROCEDURE EVALUATION
Post-Op Assessment Note      CV Status:  Stable    Mental Status:  Awake    Hydration Status:  Stable    PONV Controlled:  Controlled    Airway Patency:  Patent    Post Op Vitals Reviewed: Yes          Staff: Anesthesiologist           BP   132/60   Temp   98 9   Pulse  86   Resp   20   SpO2  100

## 2018-07-12 NOTE — PHYSICAL THERAPY NOTE
PT NOTE:            Attempted to see pt this am however pt going to OR and unavailable  Will reattempt as able

## 2018-07-13 NOTE — PROGRESS NOTES
[facility] Progress Note  Patient: Gurdeep Handy 82 y o  female   MRN: [id number]  PCP: Alexis Ashby DO  Unit/Bed#: 781-86 Encounter: [id number]  Date Of Visit: 07/13/18    Assessment:    Principal Problem:    Bacteremia due to methicillin susceptible Staphylococcus aureus (MSSA)  Active Problems:    Benign essential hypertension    Hyperlipidemia    Acute blood loss anemia    Rectal prolapse    Generalized weakness    Acute kidney injury ([address] )    Hyponatremia    Partial small bowel obstruction (HCC)    Hyperglycemia    Leukocytosis    Pressure ulcer of sacral region, stage 2    Abscess of axilla, right      Plan:    · MSSA bacteremia - source most likely R axilla abscess/infected hematoma, IE less likely, would require PICC placement once cultures clear for 2-4 weeks of IV cefazolin 2g IV q8h  · R axilla abscess - s/p I+D 7/12/18 by Dr Betancourt [place]  · Acute on chronic anemia - stable, suspect chronic iron deficiency anemia with ABLA due to hematochezia  · Rectal prolapse - likely source of occult chronic GI bleed  · NANETTE - resolving, baseline eGFR 51 4/13/18  · Hyponatremia - POA, resolved with IV fluids  · HTN - controlled with beta blocker & low dose ARB, diuretic on hold  · Generalized weakness - ultimately would require SNF placement for Rehab    -cont IV cefazolin, wound care per surg team  -surveillance blood cultures tomorrow, if sterile then PICC placement for long term IV antibiotic Monday or Tuesday, MARTINEZ if persistent bacteremia  -monitor CBC & renal fxn      VTE Pharmacologic Prophylaxis:   Pharmacologic: Pharmacologic VTE Prophylaxis contraindicated due to ABLA  Mechanical VTE Prophylaxis in Place: Yes    Patient Centered Rounds: I have performed bedside rounds with nursing staff today      Current Length of Stay: 5 day(s)    Current Patient Status: Inpatient     Code Status: Level 1 - Full Code      Subjective:   Patient seen & examined this AM, resting in bed comfortably  R axilla mass has regressed post-op with minimal painful tenderness currently  Denies nausea, malaise or fever  Bartholome Pinks a bit weak    Objective:     Vitals:   Temp (24hrs), Av 6 °F (37 °C), Min:97 8 °F (36 6 °C), Max:99 °F (37 2 °C)    HR:  [66-89] 78  Resp:  [16-21] 18  BP: (129-149)/(54-67) 146/67  SpO2:  [95 %-100 %] 97 %  Body mass index is 27 18 kg/m²  Input and Output Summary (last 24 hours): Intake/Output Summary (Last 24 hours) at 18 1129  Last data filed at 18 0915   Gross per 24 hour   Intake             1267 ml   Output              895 ml   Net              372 ml       Physical Exam:   General: in no overt painful distress  HEENT: not pale, not jaundiced  Skin: wound dressing to R axilla clean & dry  Psych: appropriately oriented in all spheres  Neuro: Awake, alert, essentially nonfocal      Additional Data:     Labs:      Results from last 7 days  Lab Units 18  0528   WBC Thousand/uL 5 56   HEMOGLOBIN g/dL 7 4*   HEMATOCRIT % 23 9*   PLATELETS Thousands/uL 143*   NEUTROS PCT % 75   LYMPHS PCT % 15   MONOS PCT % 9   EOS PCT % 2       Results from last 7 days  Lab Units 18  0528 18  0512   SODIUM mmol/L 139 134*   POTASSIUM mmol/L 3 3* 3 6   CHLORIDE mmol/L 109* 106   CO2 mmol/L 21 20*   BUN mg/dL 16 26*   CREATININE mg/dL 1 12 1 25   CALCIUM mg/dL 8 0* 7 8*   TOTAL PROTEIN g/dL  --  5 1*   BILIRUBIN TOTAL mg/dL  --  0 40   ALK PHOS U/L  --  70   ALT U/L  --  14   AST U/L  --  14   GLUCOSE RANDOM mg/dL 99 115       Results from last 7 days  Lab Units 18  0449   INR  1 11     Invalid input(s): TROIP    * I Have Reviewed All Lab Data Listed Above  * Additional Pertinent Lab Tests Reviewed: All Labs Within Last 24 Hours Reviewed      Recent Cultures (last 7 days):       Results from last 7 days  Lab Units 18  1319 18  1228 18  2323   BLOOD CULTURE   --  No Growth at 24 hrs   Staphylococcus aureus*  Staphylococcus aureus*   GRAM STAIN RESULT  No polys seen  No bacteria seen  --  Gram positive cocci in clusters  Gram positive cocci in clusters       Last 24 Hours Medication List:     Current Facility-Administered Medications:  acetaminophen 650 mg Oral Q6H PRN Arpit Vickers PA-C    ascorbic acid 500 mg Oral Daily Rex Mccarthy MD    atorvastatin 20 mg Oral Daily With Dinner Arpit Vickers PA-C    cefazolin 2,000 mg Intravenous Q8H Rex Mccarthy MD Last Rate: 2,000 mg (07/13/18 0819)   doxazosin 4 mg Oral HS Arpit Vickers PA-C    ferrous sulfate 300 mg Oral Daily Rex Mccarthy MD    hydrocortisone  Rectal BID Arpit Vickers PA-C    magnesium sulfate 1 g Intravenous Once Rex Mccarthy MD    menthol-zinc oxide  Topical BID Griselda Teague MD    metoprolol tartrate 50 mg Oral Q12H [facility] Arpit Vickers PA-C    ondansetron 4 mg Intravenous Q6H PRN Arpit Vickers PA-C    oxybutynin 5 mg Oral Daily Arpit Vickers PA-C    pantoprazole 40 mg Oral BID AC Arpit Vickers PA-C    potassium chloride 40 mEq Oral Once Rex Mccarthy MD    senna-docusate sodium 1 tablet Oral BID Rex Mccarthy MD    valsartan 40 mg Oral Daily Rex Mccarthy MD         Today, Patient Was Seen By: Rex Mccarthy MD    ** Please Note: Dragon 360 Dictation voice to text software may have been used in the creation of this document   **

## 2018-07-13 NOTE — PLAN OF CARE
Problem: INFECTION - ADULT  Goal: Absence or prevention of progression during hospitalization  INTERVENTIONS:  - Assess and monitor for signs and symptoms of infection  - Monitor lab/diagnostic results  - Monitor all insertion sites, i e  indwelling lines, tubes, and drains  - [place] appropriate cooling/warming therapies per order  - Administer medications as ordered  - Instruct and encourage patient and family to use good hand hygiene technique  - Identify and instruct in appropriate isolation precautions for identified infection/condition  Outcome: Progressing    Goal: Absence of fever/infection during neutropenic period  INTERVENTIONS:  - Monitor WBC  Outcome: Progressing

## 2018-07-13 NOTE — PROGRESS NOTES
Progress Note - General Surgery   Rebecca Suazo 82 y o  female MRN: [id number]  Unit/Bed#: 417-01 Encounter: [id number]    Assessment:  Postop day 1  Status post incision and drainage abscess right axilla  No leukocytosis  Hypokalemia, potassium 3 3 today  Plan:  Packing removed  IV antibiotics and pain control per the hospitalist team  Dry dressing, ABD  No further surgical intervention at this time  Will follow only as needed  Hospitalist team to replete the patient's potassium  Subjective/Objective   Chief Complaint:  Patient and I and D of mass in her right axilla performed in the OR yesterday  Packing was placed at that time  Subjective:  Patient has some discomfort in the right armpit  I spent applied  There has been some drainage noted on the ABD dressing on her right armpit  Patient has been afebrile  Wound culture did not show any preliminary growth  Blood cultures were negative  Patient remains on 2 g of IV cefazolin q 8 hours      Scheduled Meds:  Current Facility-Administered Medications:  acetaminophen 650 mg Oral Q6H PRN Arpit Vickers PA-C    ascorbic acid 500 mg Oral Daily Pegge [place], MD    atorvastatin 20 mg Oral Daily With Dinner Arpit Vickers PA-C    cefazolin 2,000 mg Intravenous Q8H Lolly Sprague MD Last Rate: 2,000 mg (07/13/18 0819)   doxazosin 4 mg Oral HS Arpit Vickers PA-C    ferrous sulfate 300 mg Oral Daily Pegge [place], MD    hydrocortisone  Rectal BID Arpit Vickers PA-C    magnesium sulfate 1 g Intravenous Once Pegge [place], MD    menthol-zinc oxide  Topical BID Majo Dao MD    metoprolol tartrate 50 mg Oral Q12H [address] Arpit Vickers PA-C    ondansetron 4 mg Intravenous Q6H PRN Arpit Vickers PA-C    oxybutynin 5 mg Oral Daily Arpit Vickers PA-C    pantoprazole 40 mg Oral BID AC Arpit Vickers PA-C    potassium chloride 40 mEq Oral Once Pegge [place], MD    senna-docusate sodium 1 tablet Oral BID Pegge [place], MD    valsartan 40 mg Oral Daily Pegge [place], MD Continuous Infusions:   PRN Meds:   acetaminophen    ondansetron      Objective:     Blood pressure 146/67, pulse 78, temperature 97 8 °F (36 6 °C), temperature source Temporal, resp  rate 18, height 4' 8" (1 422 m), weight 55 kg (121 lb 4 1 oz), SpO2 97 %  ,Body mass index is 27 18 kg/m²  Intake/Output Summary (Last 24 hours) at 07/13/18 1114  Last data filed at 07/13/18 0915   Gross per 24 hour   Intake             1267 ml   Output              895 ml   Net              372 ml       Invasive Devices     Peripheral Intravenous Line            Peripheral IV 07/12/18 Right Foot less than 1 day          Drain            Urethral Catheter Double-lumen 16 Fr  1 day                Physical Exam:  Packing was moved from the right axillary incision  The wound is open  The skin is significantly improved with less erythema  The area is not warm to touch  Lab, Imaging and other studies:  I have personally reviewed pertinent lab results    , CBC:   Lab Results   Component Value Date    WBC 5 56 07/13/2018    HGB 7 4 (L) 07/13/2018    HCT 23 9 (L) 07/13/2018    MCV 93 07/13/2018     (L) 07/13/2018    MCH 28 8 07/13/2018    MCHC 31 0 (L) 07/13/2018    RDW 16 1 (H) 07/13/2018    MPV 10 6 07/13/2018   , CMP:   Lab Results   Component Value Date     07/13/2018    K 3 3 (L) 07/13/2018     (H) 07/13/2018    CO2 21 07/13/2018    ANIONGAP 9 07/13/2018    BUN 16 07/13/2018    CREATININE 1 12 07/13/2018    GLUCOSE 99 07/13/2018    CALCIUM 8 0 (L) 07/13/2018    EGFR 46 07/13/2018     VTE Pharmacologic Prophylaxis: Reason for no pharmacologic prophylaxis Can now be re-initiated by the hospitalist team   VTE Mechanical Prophylaxis: sequential compression device     Victoria Angel PA-C

## 2018-07-13 NOTE — OCCUPATIONAL THERAPY NOTE
OT Note     07/13/18 1146   Restrictions/Precautions   Other Precautions Fall Risk   ADL   Where Assessed Chair   Grooming Assistance (Reports prior completion with A nsg)   UB Bathing Assistance 4  Minimal Assistance   UB Bathing Deficit Left arm   UB Bathing Comments Range limited secondary dressing RUE, A with back   LB Bathing Assistance (Completed by nsg)   UB Dressing Assistance 4  Minimal Assistance   UB Dressing Comments Secondary dressing RUE, fasteners   LB Dressing Assistance (Completed by nsg)   Bed Mobility   Rolling L 4  Minimal assistance   Additional items Assist x 1;Bedrails; Increased time required   Supine to Sit 5  Supervision   Additional items HOB elevated; Increased time required   Transfers   Sit to Stand 4  Minimal assistance   Additional items Assist x 1   Stand to Sit 4  Minimal assistance   Additional items Armrests   Functional Mobility   Functional Mobility 3  Moderate assistance   Additional Comments A x 2 to complete SPT, unable advance LLE   Additional items Rolling walker   Activity Tolerance   Activity Tolerance Patient tolerated treatment well   Assessment   Assessment Presents supine, txfrs EOB  Attempt txfr EOB to recliner at bedside however pt  unable advance LLE despite cues  Chair placed directly behind patient  Cues for safe siit  Scoots back in chair as able however requires A for moving back in recliner secondary short stature and limited range RUE  SCD applied LLE and turned on  Chair alarm activated  Call bell in reach     Recommendation   Recommendation (Continue OT services )

## 2018-07-13 NOTE — PROGRESS NOTES
Progress Note - Nephrology   Patrice Olvera 82 y o  female MRN: [id number]  Unit/Bed#: 417-01 Encounter: [id number]    A/P:  1  Acute kidney injury: resolving with IVF  Fractional excretion of sodium was 0 08% indicating profound volume depletion  2  Hyponatremia: due to hypovolemia  7/11: has resolved with IVF  7/12: kidney function at her baseline  Serum sodium slightly reduced - continue to monitor  7/13: Kidney function has returned to baseline  Replace potassium  3  Metabolic alkalosis: responding to volume  4  Partial small bowel obstruction: improving and on clear liquids  5  Anemia: being followed with serial studies   7/11: check iron saturation  7/13: has iron deficiency  Avoid IV iron at this time  6  Gram positive bacteremia: on vancomycin    Follow up reason for today's visit: Acute kidney injury    Bacteremia due to methicillin susceptible Staphylococcus aureus (MSSA)    Patient Active Problem List   Diagnosis    Dislocation of shoulder region, right, initial encounter    Acute respiratory failure with hypoxia and hypercapnia (HCC)    Benign essential hypertension    GI bleed    Hyperlipidemia    Atrial fibrillation (HCC)    Anemia    Acute blood loss anemia    Vaginal prolapse    Elevated troponin    Hematochezia    Urinary retention    Rectal prolapse    Generalized weakness    Acute kidney injury (HCC)    Hyponatremia    Partial small bowel obstruction (HCC)    Hyperglycemia    Leukocytosis    Bacteremia due to methicillin susceptible Staphylococcus aureus (MSSA)    Pressure ulcer of sacral region, stage 2    Abscess of axilla, right         Subjective:   Feels better today  Denies headaches dizziness chest pain shortness of breath abdominal pain nausea or vomiting  She has not had a bowel movement  But she is urinating well  Objective:     Vitals: Blood pressure 146/67, pulse 78, temperature 97 8 °F (36 6 °C), temperature source Temporal, resp   rate 18, height 4' 8" (1 422 m), weight 55 kg (121 lb 4 1 oz), SpO2 97 %  ,Body mass index is 27 18 kg/m²  Weight (last 2 days)     Date/Time   Weight    07/13/18 0559  55 (121 25)    07/12/18 0600  52 (114 64)    07/11/18 0534  53 5 (117 95)                Intake/Output Summary (Last 24 hours) at 07/13/18 1039  Last data filed at 07/13/18 0915   Gross per 24 hour   Intake           1854 5 ml   Output              895 ml   Net            959 5 ml     I/O last 3 completed shifts: In: 2774 5 [P O :180; I V :2594 5]  Out: 1195 [Urine:1095; Stool:100]         Physical Exam: /67 (BP Location: Left arm)   Pulse 78   Temp 97 8 °F (36 6 °C) (Temporal)   Resp 18   Ht 4' 8" (1 422 m) Comment: There appears to be some discrepancy in accurate ht  Per chart review:  4'2"    However, pt states 4'9" and pt's family reports 4'8"  Wt 55 kg (121 lb 4 1 oz)   SpO2 97%   BMI 27 18 kg/m²     General Appearance:    Alert, cooperative, no distress, appears stated age   Head:    Normocephalic, without obvious abnormality, atraumatic   Eyes:    Conjunctiva/corneas clear   Ears:    Normal external ears   Nose:   Nares normal, septum midline, mucosa normal, no drainage    or sinus tenderness   Throat:   Lips, mucosa, and tongue normal; teeth and gums normal   Neck:   Supple, symmetrical, trachea midline, no adenopathy;        thyroid:  No enlargement/tenderness/nodules; no carotid    bruit or JVD   Back:     Symmetric, no curvature, ROM normal, no CVA tenderness   Lungs:     Dec to auscultation bilaterally, respirations unlabored   Chest wall:    No tenderness or deformity   Heart:    Regular rate and rhythm, S1 and S2 normal, no murmur, rub   or gallop   Abdomen:     Soft, non-tender, bowel sounds present   Extremities:   Extremities normal, atraumatic, no cyanosis or edema   Skin:   Skin color, texture, turgor normal, no rashes or lesions   Lymph nodes:   Cervical normal   Neurologic:   CNII-XII intact            Lab, Imaging and other studies: I have personally reviewed pertinent labs  CBC:   Lab Results   Component Value Date    WBC 5 56 07/13/2018    HGB 7 4 (L) 07/13/2018    HCT 23 9 (L) 07/13/2018    MCV 93 07/13/2018     (L) 07/13/2018    MCH 28 8 07/13/2018    MCHC 31 0 (L) 07/13/2018    RDW 16 1 (H) 07/13/2018    MPV 10 6 07/13/2018     CMP:   Lab Results   Component Value Date     07/13/2018    K 3 3 (L) 07/13/2018     (H) 07/13/2018    CO2 21 07/13/2018    ANIONGAP 9 07/13/2018    BUN 16 07/13/2018    CREATININE 1 12 07/13/2018    GLUCOSE 99 07/13/2018    CALCIUM 8 0 (L) 07/13/2018    EGFR 46 07/13/2018         Results from last 7 days  Lab Units 07/13/18  0528 07/11/18  0512 07/10/18  0626 07/09/18  0449   SODIUM mmol/L 139 134* 136 137   POTASSIUM mmol/L 3 3* 3 6 3 9 4 1   CHLORIDE mmol/L 109* 106 107 105   CO2 mmol/L 21 20* 21 23   BUN mg/dL 16 26* 32* 45*   CREATININE mg/dL 1 12 1 25 1 30 1 96*   CALCIUM mg/dL 8 0* 7 8* 7 6* 8 0*   TOTAL PROTEIN g/dL  --  5 1* 5 3* 5 8*   BILIRUBIN TOTAL mg/dL  --  0 40 0 50 0 60   ALK PHOS U/L  --  70 65 68   ALT U/L  --  14 15 15   AST U/L  --  14 14 19   GLUCOSE RANDOM mg/dL 99 115 128 120         Phosphorus: No results found for: PHOS  Magnesium:   No results found for: MG  Urinalysis: No results found for: COLORU, CLARITYU, SPECGRAV, PHUR, LEUKOCYTESUR, NITRITE, PROTEINUA, GLUCOSEU, KETONESU, BILIRUBINUR, BLOODU  Ionized Calcium: No results found for: CAION  Coagulation: No results found for: PT, INR, APTT  Troponin: No results found for: TROPONINI  ABG: No results found for: PHART, UGM6BIU, PO2ART, NEY4WXO, E7IPRWGO, BEART, SOURCE  Radiology review:     IMAGING  Procedure: Ct Chest Abdomen Pelvis Wo Contrast    Result Date: 7/8/2018  Narrative: CT CHEST, ABDOMEN AND PELVIS WITHOUT IV CONTRAST INDICATION:   Shortness of breath, abdominal pain, hypotensive, renal failure  COMPARISON: 4/12/2018   TECHNIQUE: CT examination of the chest, abdomen and pelvis was performed without intravenous contrast   Axial, sagittal, and coronal 2D reformatted images were created from the source data and submitted for interpretation  Radiation dose length product (DLP) for this visit:  281 88 mGy-cm   This examination, like all CT scans performed in the [facility], was performed utilizing techniques to minimize radiation dose exposure, including the use of iterative  reconstruction and automated exposure control  Enteric contrast was administered  FINDINGS: CHEST LUNGS:  Lungs are clear  There is no tracheal or endobronchial lesion  PLEURA:  Unremarkable  HEART/GREAT VESSELS:  Normal heart size  Coronary artery calcifications noted  Normal caliber thoracic aorta with mild atherosclerotic calcifications  MEDIASTINUM AND BABAR:  Unremarkable  CHEST WALL AND LOWER NECK:   Multiple chronic right rib fractures again identified  There is a fairly well-circumscribed 5 4 x 4 9 cm collection of simple fluid in the right axillary region, which may represent a seroma or resolving hematoma  ABDOMEN Absence of intravenous contrast enhancement limits evaluation of the solid abdominal viscera  LIVER/BILIARY TREE:  Unremarkable  GALLBLADDER:  No calcified gallstones  No pericholecystic inflammatory change  SPLEEN:  Unremarkable  PANCREAS:  Unremarkable  ADRENAL GLANDS:  Unremarkable  KIDNEYS/URETERS:  Unremarkable  No hydronephrosis  STOMACH AND BOWEL:  Several mildly dilated small bowel loops in the lower abdomen to the level of a herniated loop of bowel in a small right paramedian ventral hernia in the lower abdomen, concerning for early or partial small bowel obstruction  APPENDIX:  No findings to suggest appendicitis  ABDOMINOPELVIC CAVITY:  No ascites or free intraperitoneal air  No lymphadenopathy  VESSELS:  Unremarkable for patient's age  PELVIS REPRODUCTIVE ORGANS:  Unremarkable for patient's age  URINARY BLADDER:  Unremarkable   ABDOMINAL WALL/INGUINAL REGIONS:  Small right paramedian ventral hernia in the lower abdomen containing a loop of small bowel, as above  This is new since the prior exam  OSSEOUS STRUCTURES:  No acute fracture or destructive osseous lesion  Chronic fractures of the right ribs, bilateral sacral ala, and left pubic rami appear unchanged  Chronic right shoulder dislocation appears unchanged  Unchanged vertebral wedge deformity of L2  Impression: Findings concerning for early or partial small bowel obstruction related to a herniated small bowel loop within a right lower quadrant ventral hernia  5 4 cm fluid collection in the right axilla, new since the prior study  This may represent a seroma or resolving hematoma  Multiple chronic right rib and pelvic fractures  Chronic right shoulder dislocation also again identified  I personally discussed this study with Margret Dyson on 7/8/2018 at 3:37 PM  Workstation performed: [id number]     Procedure: Us Kidney And Bladder    Result Date: 7/9/2018  Narrative: RENAL ULTRASOUND INDICATION:   acute renal failure  COMPARISON: None TECHNIQUE:   Ultrasound of the retroperitoneum was performed with a curvilinear transducer utilizing volumetric sweeps and still imaging techniques  FINDINGS: KIDNEYS: Symmetric and normal size  Right kidney:  10 3 x 4 3 cm  Left kidney:  9 5 x 4 9 cm  Right kidney Normal echogenicity and contour  No suspicious masses detected  Multiple simple cortical cysts (largest 1 3 cm)  No hydronephrosis  No shadowing calculi  Small right perirenal fluid collection  Left kidney Normal echogenicity and contour  No suspicious masses detected  No hydronephrosis  No shadowing calculi  No perinephric fluid collections  URETERS: Nonvisualized  BLADDER: Normally distended  No focal thickening or mass lesions  Left ureteral jet detected  Impression: Negative for evidence of obstructive uropathy  Small right perinephric fluid collection, nonspecific (but may be related to infection)   Workstation performed: [id number] Procedure: Us Extremity Soft Tissue    Result Date: 7/9/2018  Narrative: UPPER EXTREMITY ULTRASOUND INDICATION:  Right axillary lump COMPARISON:  None  FINDINGS: Ultrasound evaluation of the right axilla performed to evaluate lump  A 5 6 x 4 9 x 5 7 cm flex, primarily cystic lesion with irregular borders, internal debris and enhanced through transmission is seen in the right axilla at the site of clinical concern  Impression: Complex right axillary lesion likely a abscess or complicated cyst/hematoma  Correlation with the clinical findings recommended in this regard and, if warranted, short interval follow-up following appropriate clinical therapy suggested   Workstation performed: [id number]       Current Facility-Administered Medications   Medication Dose Route Frequency    acetaminophen (TYLENOL) tablet 650 mg  650 mg Oral Q6H PRN    ascorbic acid (VITAMIN C) tablet 500 mg  500 mg Oral Daily    atorvastatin (LIPITOR) tablet 20 mg  20 mg Oral Daily With Dinner    ceFAZolin (ANCEF) IVPB (premix) 2,000 mg  2,000 mg Intravenous Q8H    doxazosin (CARDURA) tablet 4 mg  4 mg Oral HS    ferrous sulfate oral elixer 300 mg  300 mg Oral Daily    hydrocortisone (ANUSOL-HC, PROCTOSOL HC)) 2 5 % rectal cream   Rectal BID    magnesium sulfate IVPB (premix) SOLN 1 g  1 g Intravenous Once    menthol-zinc oxide (CALMOSEPTINE) 0 44-20 6 % ointment   Topical BID    metoprolol tartrate (LOPRESSOR) tablet 50 mg  50 mg Oral Q12H [address]    ondansetron (ZOFRAN) injection 4 mg  4 mg Intravenous Q6H PRN    oxybutynin (DITROPAN) tablet 5 mg  5 mg Oral Daily    pantoprazole (PROTONIX) EC tablet 40 mg  40 mg Oral BID AC    potassium chloride 10 % oral solution 40 mEq  40 mEq Oral Once    senna-docusate sodium (SENOKOT S) 8 6-50 mg per tablet 1 tablet  1 tablet Oral BID    valsartan (DIOVAN) tablet 40 mg  40 mg Oral Daily     Medications Discontinued During This Encounter   Medication Reason    sodium chloride 0 9 % infusion     losartan potassium-hydrochlorothiazide (HYZAAR 100/12  5) combo dose     hydrocortisone-pramoxine (ANALPRAM-HC) 2 5-1 % rectal cream     vancomycin (VANCOCIN) 750 mg in sodium chloride 0 9 % 250 mL IVPB     sodium chloride 0 9 % infusion     sodium chloride 0 9 % irrigation solution Patient Discharge    bupivacaine (MARCAINE) 0 5 % injection Patient Discharge    ondansetron (ZOFRAN) injection 4 mg Patient Transfer    vancomycin (VANCOCIN) 750 mg in sodium chloride 0 9 % 250 mL IVPB     lactated ringers infusion        Danica Blair MD

## 2018-07-13 NOTE — MEDICAL STUDENT
MEDICAL STUDENT  Inpatient Progress Note for TRAINING ONLY  Not Part of Legal Medical Record       Progress Note - Gurdeep Handy 82 y o  female MRN: [id number]    Unit/Bed#: 417-01 Encounter: [id number]      Assessment:  1  Post day 1 for drained abscess of the right axilla    Plan:  1  Five yards of packing was removed from the abscess this morning and new dressing of sterile gauze was applied  Cold pack was applied as needed for pain at this time  Wound should continue to be monitored for any abnormal discharge and dressing should be changed every 12 hours to visualize the wound  Patient can take acetaminophen 650 mg every 6 hours as needed for pain as well as cold pack  Patient can also continue on IV antibiotics, 2g of IV cefazolin q8 hours  Subjective:   Morro Randle  is an 82 y o  female who is currently admitted to the hospital service and yesterday underwent incision and drainage for a mass in the right axilla  Pre-operatively the mass and erythema measured ~13 cm  After irrigation with normal saline the wound was packed with 5 yards of sterile gauze and covered with a sterile dressing  Today, patient states that she was still feeling a lot of pain but states that she is not currently taking any pain medication  On examination of the wound, the area was slightly erythematous but significantly less indurated  Five yards of packing was removed with evidence of blood and some discharge; no foul smell was noted  Patient experienced slight discomfort upon removal of the packing and axilla with surrounding area was tender to light palpation  However, patient states that this pain and tenderness is much improved from before I&D was preformed  Objective:     Vitals: Blood pressure 146/67, pulse 78, temperature 97 8 °F (36 6 °C), temperature source Temporal, resp  rate 18, height 4' 8" (1 422 m), weight 55 kg (121 lb 4 1 oz), SpO2 97 %  ,Body mass index is 27 18 kg/m²        Intake/Output Summary (Last 24 hours) at 07/13/18 1641 [facility] filed at 07/13/18 0915   Gross per 24 hour   Intake             1267 ml   Output              895 ml   Net              372 ml       Physical Exam: Skin: 13 cm erythematous region of the right axilla extending laterally  There was not evidence of gross bloody or purulent drainage after removal of the packing  Invasive Devices     Peripheral Intravenous Line            Peripheral IV 07/12/18 Right Foot less than 1 day          Drain            Urethral Catheter Double-lumen 16 Fr  1 day                Lab, Imaging and other studies: I did not review any imaging for this patient    VTE Pharmacologic Prophylaxis: Sequential compression device (Venodyne)   VTE Mechanical Prophylaxis: sequential compression device

## 2018-07-13 NOTE — NUTRITION
07/13/18 1207   Recommendations/Interventions   Summary Pt now NPO/clear liquids x 5 days  Diet overall inadequate to meet estimated needs  May need to consider alternate means of nutrition if diet is unable to be advanced  Will monitor for plan of care  Noted pt s/p I&D abscess R axilla

## 2018-07-13 NOTE — PLAN OF CARE
Problem: Potential for Falls  Goal: Patient will remain free of falls  INTERVENTIONS:  - Assess patient frequently for physical needs  -  Identify cognitive and physical deficits and behaviors that affect risk of falls  -  [place] fall precautions as indicated by assessment   High fall risk    - Educate patient/family on patient safety including physical limitations  - Instruct patient to call for assistance with activity based on assessment  - Modify environment to reduce risk of injury  - Consider OT/PT consult to assist with strengthening/mobility   Outcome: Progressing      Problem: Prexisting or High Potential for Compromised Skin Integrity  Goal: Skin integrity is maintained or improved  INTERVENTIONS:  - Identify patients at risk for skin breakdown  - Assess and monitor skin integrity  - Assess and monitor nutrition and hydration status  - Monitor labs (i e  albumin)  - Assess for incontinence   - Turn and reposition patient  - Assist with mobility/ambulation  - Relieve pressure over bony prominences  - Avoid friction and shearing  - Provide appropriate hygiene as needed including keeping skin clean and dry  - Evaluate need for skin moisturizer/barrier cream  - Collaborate with interdisciplinary team (i e  Nutrition, Rehabilitation, etc )   - Patient/family teaching   Outcome: Progressing      Problem: PAIN - ADULT  Goal: Verbalizes/displays adequate comfort level or baseline comfort level  Interventions:  - Encourage patient to monitor pain and request assistance  - Assess pain using appropriate pain scale  - Administer analgesics based on type and severity of pain and evaluate response  - Implement non-pharmacological measures as appropriate and evaluate response  - Consider cultural and social influences on pain and pain management  - Notify physician/advanced practitioner if interventions unsuccessful or patient reports new pain   Outcome: Progressing      Problem: SAFETY ADULT  Goal: Maintain or return to baseline ADL function  INTERVENTIONS:  -  Assess patient's ability to carry out ADLs; assess patient's baseline for ADL function and identify physical deficits which impact ability to perform ADLs (bathing, care of mouth/teeth, toileting, grooming, dressing, etc )  - Assess/evaluate cause of self-care deficits   - Assess range of motion  - Assess patient's mobility; develop plan if impaired  - Assess patient's need for assistive devices and provide as appropriate  - Encourage maximum independence but intervene and supervise when necessary  ¯ Involve family in performance of ADLs  ¯ Assess for home care needs following discharge   ¯ Request OT consult to assist with ADL evaluation and planning for discharge  ¯ Provide patient education as appropriate   Outcome: Progressing    Goal: Maintain or return mobility status to optimal level  INTERVENTIONS:  - Assess patient's baseline mobility status (ambulation, transfers, stairs, etc )    - Identify cognitive and physical deficits and behaviors that affect mobility  - Identify mobility aids required to assist with transfers and/or ambulation (gait belt, sit-to-stand, lift, walker, cane, etc )  - [place] fall precautions as indicated by assessment  - Record patient progress and toleration of activity level on Mobility SBAR; progress patient to next Phase/Stage  - Instruct patient to call for assistance with activity based on assessment  - Request Rehabilitation consult to assist with strengthening/weightbearing, etc    Outcome: Progressing      Problem: DISCHARGE PLANNING  Goal: Discharge to home or other facility with appropriate resources  INTERVENTIONS:  - Identify barriers to discharge w/patient and caregiver  - Arrange for needed discharge resources and transportation as appropriate  - Identify discharge learning needs (meds, wound care, etc )  - Arrange for interpretive services to assist at discharge as needed  - Refer to Case Management Department for coordinating discharge planning if the patient needs post-hospital services based on physician/advanced practitioner order or complex needs related to functional status, cognitive ability, or social support system   Outcome: Progressing      Problem: Knowledge Deficit  Goal: Patient/family/caregiver demonstrates understanding of disease process, treatment plan, medications, and discharge instructions  Complete learning assessment and assess knowledge base  Interventions:  - Provide teaching at level of understanding  - Provide teaching via preferred learning methods   Outcome: Progressing      Problem: Nutrition/Hydration-ADULT  Goal: Nutrient/Hydration intake appropriate for improving, restoring or maintaining nutritional needs  Monitor and assess patient's nutrition/hydration status for malnutrition (ex- brittle hair, bruises, dry skin, pale skin and conjunctiva, muscle wasting, smooth red tongue, and disorientation)  Collaborate with interdisciplinary team and initiate plan and interventions as ordered  Monitor patient's weight and dietary intake as ordered or per policy  Utilize nutrition screening tool and intervene per policy  Determine patient's food preferences and provide high-protein, high-caloric foods as appropriate       INTERVENTIONS:  - Monitor oral intake, urinary output, labs, and treatment plans  - Assess nutrition and hydration status and recommend course of action  - Evaluate amount of meals eaten  - Assist patient with eating if necessary   - Allow adequate time for meals  - Recommend/ encourage appropriate diets, oral nutritional supplements, and vitamin/mineral supplements  - Order, calculate, and assess calorie counts as needed  - Recommend, monitor, and adjust tube feedings and TPN/PPN based on assessed needs  - Assess need for intravenous fluids  - Provide specific nutrition/hydration education as appropriate  - Include patient/family/caregiver in decisions related to nutrition   Outcome: Progressing      Problem: DISCHARGE PLANNING - CARE MANAGEMENT  Goal: Discharge to post-acute care or home with appropriate resources  INTERVENTIONS:  - Conduct assessment to determine patient/family and health care team treatment goals, and need for post-acute services based on payer coverage, community resources, and patient preferences, and barriers to discharge  - Address psychosocial, clinical, and financial barriers to discharge as identified in assessment in conjunction with the patient/family and health care team  - Arrange appropriate level of post-acute services according to patient's   needs and preference and payer coverage in collaboration with the physician and health care team  - Communicate with and update the patient/family, physician, and health care team regarding progress on the discharge plan  - Arrange appropriate transportation to post-acute venues   Outcome: Progressing

## 2018-07-14 NOTE — PROGRESS NOTES
Progress Note - Nephrology   Shauna Caro 82 y o  female MRN: [id number]  Unit/Bed#: 417-01 Encounter: [id number]    A/P:  1  Acute kidney injury due to volume depletion in the setting of an abscess-resolved  2  Hyponatremia: due to hypovolemia-resolved  3  Hypokalemia   This morning's blood work is pending, she is status post supplementation yesterday follow-up lab results  4  Metabolic alkalosis:  Resolved  5  Partial small bowel obstruction: improving and on clear liquids  6   Iron deficiency Anemia: being followed with serial studies    Her voice use of IV iron at this time due to active bacteremia  7   Methicillin sensitive Staphylococcus aureus bacteremia: on Cefazolin   Patient is on high-dose Cefazolin treatment, continue according to Infectious Disease recommendations  Follow up reason for today's visit: Acute kidney injury    Bacteremia due to methicillin susceptible Staphylococcus aureus (MSSA)    Patient Active Problem List   Diagnosis    Dislocation of shoulder region, right, initial encounter    Acute respiratory failure with hypoxia and hypercapnia (HCC)    Benign essential hypertension    GI bleed    Hyperlipidemia    Atrial fibrillation (HCC)    Anemia    Acute blood loss anemia    Vaginal prolapse    Elevated troponin    Hematochezia    Urinary retention    Rectal prolapse    Generalized weakness    Acute kidney injury (HCC)    Hyponatremia    Partial small bowel obstruction (HCC)    Hyperglycemia    Leukocytosis    Bacteremia due to methicillin susceptible Staphylococcus aureus (MSSA)    Pressure ulcer of sacral region, stage 2    Abscess of axilla, right         Subjective:   Feels better today  Denies headaches dizziness chest pain shortness of breath abdominal pain nausea or vomiting  She has not had a bowel movement  But she is urinating well      Objective:     Vitals: Blood pressure (!) 177/80, pulse 81, temperature 97 7 °F (36 5 °C), temperature source Temporal, resp  rate 18, height 4' 8" (1 422 m), weight 55 kg (121 lb 4 1 oz), SpO2 95 %  ,Body mass index is 27 18 kg/m²  Weight (last 2 days)     Date/Time   Weight    07/14/18 0551  55 (121 25)    07/13/18 0559  55 (121 25)    07/12/18 0600  52 (114 64)                Intake/Output Summary (Last 24 hours) at 07/14/18 0843  Last data filed at 07/14/18 0434   Gross per 24 hour   Intake              240 ml   Output              450 ml   Net             -210 ml     I/O last 3 completed shifts: In: 240 [P O :240]  Out: 900 [Urine:900]         Physical Exam: BP (!) 177/80 (BP Location: Left arm)   Pulse 81   Temp 97 7 °F (36 5 °C) (Temporal)   Resp 18   Ht 4' 8" (1 422 m) Comment: There appears to be some discrepancy in accurate ht  Per chart review:  4'2"    However, pt states 4'9" and pt's family reports 4'8"  Wt 55 kg (121 lb 4 1 oz)   SpO2 95%   BMI 27 18 kg/m²     General Appearance:    Alert, cooperative, no distress, appears stated age   Head:    Normocephalic, without obvious abnormality, atraumatic   Eyes:    Conjunctiva/corneas clear   Ears:    Normal external ears   Nose:   Nares normal, septum midline, mucosa normal, no drainage    or sinus tenderness   Throat:   Lips, mucosa, and tongue normal; teeth and gums normal   Neck:   Supple, symmetrical, trachea midline, no adenopathy;        thyroid:  No enlargement/tenderness/nodules; no carotid    bruit or JVD   Back:     Symmetric, no curvature, ROM normal, no CVA tenderness   Lungs:     Dec to auscultation bilaterally, respirations unlabored   Chest wall:    No tenderness or deformity   Heart:    Regular rate and rhythm, S1 and S2 normal, no murmur, rub   or gallop   Abdomen:     Soft, non-tender, bowel sounds present   Extremities:   Extremities normal, atraumatic, no cyanosis or edema   Skin:   Skin color, texture, turgor normal, no rashes or lesions   Lymph nodes:   Cervical normal   Neurologic:   CNII-XII intact            Lab, Imaging and other studies: I have personally reviewed pertinent labs  CBC:   No results found for: WBC, HGB, HCT, MCV, PLT, ADJUSTEDWBC, MCH, MCHC, RDW, MPV, NRBC  CMP:   No results found for: NA, K, CL, CO2, ANIONGAP, BUN, CREATININE, GLUCOSE, CALCIUM, AST, ALT, ALKPHOS, PROT, ALBUMIN, BILITOT, EGFR      Results from last 7 days  Lab Units 07/13/18  0528 07/11/18  0512 07/10/18  0626 07/09/18  0449   SODIUM mmol/L 139 134* 136 137   POTASSIUM mmol/L 3 3* 3 6 3 9 4 1   CHLORIDE mmol/L 109* 106 107 105   CO2 mmol/L 21 20* 21 23   BUN mg/dL 16 26* 32* 45*   CREATININE mg/dL 1 12 1 25 1 30 1 96*   CALCIUM mg/dL 8 0* 7 8* 7 6* 8 0*   TOTAL PROTEIN g/dL  --  5 1* 5 3* 5 8*   BILIRUBIN TOTAL mg/dL  --  0 40 0 50 0 60   ALK PHOS U/L  --  70 65 68   ALT U/L  --  14 15 15   AST U/L  --  14 14 19   GLUCOSE RANDOM mg/dL 99 115 128 120         Phosphorus: No results found for: PHOS  Magnesium:   No results found for: MG  Urinalysis: No results found for: COLORU, CLARITYU, SPECGRAV, PHUR, LEUKOCYTESUR, NITRITE, PROTEINUA, GLUCOSEU, KETONESU, BILIRUBINUR, BLOODU  Ionized Calcium: No results found for: CAION  Coagulation: No results found for: PT, INR, APTT  Troponin: No results found for: TROPONINI  ABG: No results found for: PHART, KQD0USY, PO2ART, XKO2QQS, [id number], BEART, SOURCE  Radiology review:     IMAGING  Procedure: Ct Chest Abdomen Pelvis Wo Contrast    Result Date: 7/8/2018  Narrative: CT CHEST, ABDOMEN AND PELVIS WITHOUT IV CONTRAST INDICATION:   Shortness of breath, abdominal pain, hypotensive, renal failure  COMPARISON: 4/12/2018  TECHNIQUE: CT examination of the chest, abdomen and pelvis was performed without intravenous contrast   Axial, sagittal, and coronal 2D reformatted images were created from the source data and submitted for interpretation  Radiation dose length product (DLP) for this visit:  281 88 mGy-cm     This examination, like all CT scans performed in the [facility], was performed utilizing techniques to minimize radiation dose exposure, including the use of iterative  reconstruction and automated exposure control  Enteric contrast was administered  FINDINGS: CHEST LUNGS:  Lungs are clear  There is no tracheal or endobronchial lesion  PLEURA:  Unremarkable  HEART/GREAT VESSELS:  Normal heart size  Coronary artery calcifications noted  Normal caliber thoracic aorta with mild atherosclerotic calcifications  MEDIASTINUM AND BABAR:  Unremarkable  CHEST WALL AND LOWER NECK:   Multiple chronic right rib fractures again identified  There is a fairly well-circumscribed 5 4 x 4 9 cm collection of simple fluid in the right axillary region, which may represent a seroma or resolving hematoma  ABDOMEN Absence of intravenous contrast enhancement limits evaluation of the solid abdominal viscera  LIVER/BILIARY TREE:  Unremarkable  GALLBLADDER:  No calcified gallstones  No pericholecystic inflammatory change  SPLEEN:  Unremarkable  PANCREAS:  Unremarkable  ADRENAL GLANDS:  Unremarkable  KIDNEYS/URETERS:  Unremarkable  No hydronephrosis  STOMACH AND BOWEL:  Several mildly dilated small bowel loops in the lower abdomen to the level of a herniated loop of bowel in a small right paramedian ventral hernia in the lower abdomen, concerning for early or partial small bowel obstruction  APPENDIX:  No findings to suggest appendicitis  ABDOMINOPELVIC CAVITY:  No ascites or free intraperitoneal air  No lymphadenopathy  VESSELS:  Unremarkable for patient's age  PELVIS REPRODUCTIVE ORGANS:  Unremarkable for patient's age  URINARY BLADDER:  Unremarkable  ABDOMINAL WALL/INGUINAL REGIONS:  Small right paramedian ventral hernia in the lower abdomen containing a loop of small bowel, as above  This is new since the prior exam  OSSEOUS STRUCTURES:  No acute fracture or destructive osseous lesion  Chronic fractures of the right ribs, bilateral sacral ala, and left pubic rami appear unchanged    Chronic right shoulder dislocation appears unchanged  Unchanged vertebral wedge deformity of L2  Impression: Findings concerning for early or partial small bowel obstruction related to a herniated small bowel loop within a right lower quadrant ventral hernia  5 4 cm fluid collection in the right axilla, new since the prior study  This may represent a seroma or resolving hematoma  Multiple chronic right rib and pelvic fractures  Chronic right shoulder dislocation also again identified  I personally discussed this study with Power Martinez on 7/8/2018 at 3:37 PM  Workstation performed: [id number]     Procedure: Us Kidney And Bladder    Result Date: 7/9/2018  Narrative: RENAL ULTRASOUND INDICATION:   acute renal failure  COMPARISON: None TECHNIQUE:   Ultrasound of the retroperitoneum was performed with a curvilinear transducer utilizing volumetric sweeps and still imaging techniques  FINDINGS: KIDNEYS: Symmetric and normal size  Right kidney:  10 3 x 4 3 cm  Left kidney:  9 5 x 4 9 cm  Right kidney Normal echogenicity and contour  No suspicious masses detected  Multiple simple cortical cysts (largest 1 3 cm)  No hydronephrosis  No shadowing calculi  Small right perirenal fluid collection  Left kidney Normal echogenicity and contour  No suspicious masses detected  No hydronephrosis  No shadowing calculi  No perinephric fluid collections  URETERS: Nonvisualized  BLADDER: Normally distended  No focal thickening or mass lesions  Left ureteral jet detected  Impression: Negative for evidence of obstructive uropathy  Small right perinephric fluid collection, nonspecific (but may be related to infection)  Workstation performed: [id number]     Procedure: Us Extremity Soft Tissue    Result Date: 7/9/2018  Narrative: UPPER EXTREMITY ULTRASOUND INDICATION:  Right axillary lump COMPARISON:  None  FINDINGS: Ultrasound evaluation of the right axilla performed to evaluate lump   A 5 6 x 4 9 x 5 7 cm flex, primarily cystic lesion with irregular borders, internal debris and enhanced through transmission is seen in the right axilla at the site of clinical concern  Impression: Complex right axillary lesion likely a abscess or complicated cyst/hematoma  Correlation with the clinical findings recommended in this regard and, if warranted, short interval follow-up following appropriate clinical therapy suggested  Workstation performed: [id number]       Current Facility-Administered Medications   Medication Dose Route Frequency    acetaminophen (TYLENOL) tablet 650 mg  650 mg Oral Q6H PRN    ascorbic acid (VITAMIN C) tablet 500 mg  500 mg Oral Daily    atorvastatin (LIPITOR) tablet 20 mg  20 mg Oral Daily With Dinner    ceFAZolin (ANCEF) IVPB (premix) 2,000 mg  2,000 mg Intravenous Q8H    doxazosin (CARDURA) tablet 4 mg  4 mg Oral HS    ferrous sulfate oral elixer 300 mg  300 mg Oral Daily    hydrocortisone (ANUSOL-HC, PROCTOSOL HC)) 2 5 % rectal cream   Rectal BID    menthol-zinc oxide (CALMOSEPTINE) 0 44-20 6 % ointment   Topical BID    metoprolol tartrate (LOPRESSOR) tablet 50 mg  50 mg Oral Q12H [facility]    ondansetron (ZOFRAN) injection 4 mg  4 mg Intravenous Q6H PRN    oxybutynin (DITROPAN) tablet 5 mg  5 mg Oral Daily    pantoprazole (PROTONIX) EC tablet 40 mg  40 mg Oral BID AC    senna-docusate sodium (SENOKOT S) 8 6-50 mg per tablet 1 tablet  1 tablet Oral BID    valsartan (DIOVAN) tablet 40 mg  40 mg Oral Daily     Medications Discontinued During This Encounter   Medication Reason    sodium chloride 0 9 % infusion     losartan potassium-hydrochlorothiazide (HYZAAR 100/12  5) combo dose     hydrocortisone-pramoxine (ANALPRAM-HC) 2 5-1 % rectal cream     vancomycin (VANCOCIN) 750 mg in sodium chloride 0 9 % 250 mL IVPB     sodium chloride 0 9 % infusion     sodium chloride 0 9 % irrigation solution Patient Discharge    bupivacaine (MARCAINE) 0 5 % injection Patient Discharge    ondansetron (ZOFRAN) injection 4 mg Patient Transfer    vancomycin (VANCOCIN) 750 mg in sodium chloride 0 9 % 250 mL IVPB     lactated ringers infusion        Kelvin Andersen DO

## 2018-07-14 NOTE — PROGRESS NOTES
[facility] Progress Note  Patient: Anuj Fatima 82 y o  female   MRN: [id number]  PCP: Asia Yarbrough DO  Unit/Bed#: 860-01 Encounter: [id number]  Date Of Visit: 18    Assessment:    Principal Problem:    Bacteremia due to methicillin susceptible Staphylococcus aureus (MSSA)  Active Problems:    Benign essential hypertension    Hyperlipidemia    Acute blood loss anemia    Rectal prolapse    Generalized weakness    Acute kidney injury ([address] )    Hyponatremia    Partial small bowel obstruction (HCC)    Hyperglycemia    Leukocytosis    Pressure ulcer of sacral region, stage 2    Abscess of axilla, right      Plan:    · Wound cx 18 from OR growing MSSA  · Cont IV cefazolin  · Daily wound dressing changes  · PICC in 2-3 days for 2-4 weeks of IV antibiotic if blood cx from today showing no growth  · Cont monitoring renal fxn & CBC       VTE Pharmacologic Prophylaxis:   Pharmacologic: Pharmacologic VTE Prophylaxis contraindicated due to ABLA  Mechanical VTE Prophylaxis in Place: Yes    Patient Centered Rounds: I have performed bedside rounds with nursing staff today  Current Length of Stay: 6 day(s)    Current Patient Status: Inpatient     Code Status: Level 1 - Full Code      Subjective:   Patient seen & examined this PM, resting in bed comfortably  "soreness" to R axilla I+D site improving, feels better today    Objective:     Vitals:   Temp (24hrs), Av 2 °F (36 8 °C), Min:97 7 °F (36 5 °C), Max:98 6 °F (37 °C)    HR:  [71-85] 71  Resp:  [18-20] 18  BP: (142-183)/(75-84) 170/75  SpO2:  [94 %-99 %] 99 %  Body mass index is 27 18 kg/m²  Input and Output Summary (last 24 hours):        Intake/Output Summary (Last 24 hours) at 18 1659  Last data filed at 18 1458   Gross per 24 hour   Intake              540 ml   Output              675 ml   Net             -135 ml       Physical Exam:   General: in no overt distress  HEENT: oral mucosa moist, not pale, not jaundiced  Skin: R axilla surgical wound expressing copious amounts of seropurulent liquid, wound dressing soaked  Psych: appropriately oriented in all spheres  Neuro: Awake, alert, essentially nonfocal      Additional Data:     Labs:      Results from last 7 days  Lab Units 07/13/18  0528   WBC Thousand/uL 5 56   HEMOGLOBIN g/dL 7 4*   HEMATOCRIT % 23 9*   PLATELETS Thousands/uL 143*   NEUTROS PCT % 75   LYMPHS PCT % 15   MONOS PCT % 9   EOS PCT % 2       Results from last 7 days  Lab Units 07/13/18  0528 07/11/18  0512   SODIUM mmol/L 139 134*   POTASSIUM mmol/L 3 3* 3 6   CHLORIDE mmol/L 109* 106   CO2 mmol/L 21 20*   BUN mg/dL 16 26*   CREATININE mg/dL 1 12 1 25   CALCIUM mg/dL 8 0* 7 8*   TOTAL PROTEIN g/dL  --  5 1*   BILIRUBIN TOTAL mg/dL  --  0 40   ALK PHOS U/L  --  70   ALT U/L  --  14   AST U/L  --  14   GLUCOSE RANDOM mg/dL 99 115       Results from last 7 days  Lab Units 07/09/18  0449   INR  1 11     Invalid input(s): SAMM    * I Have Reviewed All Lab Data Listed Above  * Additional Pertinent Lab Tests Reviewed: All Labs Within Last 24 Hours Reviewed        Recent Cultures (last 7 days):       Results from last 7 days  Lab Units 07/12/18  1319 07/11/18  1228 07/08/18  2323   BLOOD CULTURE   --  No Growth at 48 hrs   Staphylococcus aureus*  Staphylococcus aureus*   GRAM STAIN RESULT  No polys seen  No bacteria seen  --  Gram positive cocci in clusters  Gram positive cocci in clusters   WOUND CULTURE  3+ Growth of Staphylococcus aureus*  --   --        Last 24 Hours Medication List:     Current Facility-Administered Medications:  acetaminophen 650 mg Oral Q6H PRN Arpit Vickers PA-C    ascorbic acid 500 mg Oral Daily Gee Escalante MD    atorvastatin 20 mg Oral Daily With Dinner Arpit Vickers PA-C    cefazolin 2,000 mg Intravenous Q8H Gee Escalante MD Last Rate: 2,000 mg (07/14/18 0829)   doxazosin 4 mg Oral HS Arpit Vickers PA-C    ferrous sulfate 300 mg Oral Daily Gee Escalante MD    hydrocortisone  Rectal BID Arpit Vickers PA-C    menthol-zinc oxide  Topical BID Sophia Duran MD    metoprolol tartrate 50 mg Oral Q12H [facility] Arpit Vickers PA-C    ondansetron 4 mg Intravenous Q6H PRN Arpit Vickers PA-C    oxybutynin 5 mg Oral Daily Arpit Vickers PA-C    pantoprazole 40 mg Oral BID AC Arpit Vickers PA-C    senna-docusate sodium 1 tablet Oral BID King Perera MD    valsartan 80 mg Oral BID King Perera MD         Today, Patient Was Seen By: King Perera MD    ** Please Note: Dragon 360 Dictation voice to text software may have been used in the creation of this document   **

## 2018-07-15 PROBLEM — I10 UNCONTROLLED STAGE 2 HYPERTENSION: Chronic | Status: ACTIVE | Noted: 2018-01-01

## 2018-07-15 NOTE — PLAN OF CARE
Problem: Potential for Falls  Goal: Patient will remain free of falls  INTERVENTIONS:  - Assess patient frequently for physical needs  -  Identify cognitive and physical deficits and behaviors that affect risk of falls  -  [place] fall precautions as indicated by assessment  High fall risk    - Educate patient/family on patient safety including physical limitations  - Instruct patient to call for assistance with activity based on assessment  - Modify environment to reduce risk of injury  - Consider OT/PT consult to assist with strengthening/mobility   INTERVENTIONS:  - Assess patient frequently for physical needs  -  Identify cognitive and physical deficits and behaviors that affect risk of falls    -  [place] fall precautions as indicated by assessment   - Educate patient/family on patient safety including physical limitations  - Instruct patient to call for assistance with activity based on assessment  - Modify environment to reduce risk of injury  - Consider OT/PT consult to assist with strengthening/mobility   Outcome: Progressing      Problem: Prexisting or High Potential for Compromised Skin Integrity  Goal: Skin integrity is maintained or improved  INTERVENTIONS:  - Identify patients at risk for skin breakdown  - Assess and monitor skin integrity  - Assess and monitor nutrition and hydration status  - Monitor labs (i e  albumin)  - Assess for incontinence   - Turn and reposition patient  - Assist with mobility/ambulation  - Relieve pressure over bony prominences  - Avoid friction and shearing  - Provide appropriate hygiene as needed including keeping skin clean and dry  - Evaluate need for skin moisturizer/barrier cream  - Collaborate with interdisciplinary team (i e  Nutrition, Rehabilitation, etc )   - Patient/family teaching   Outcome: Progressing      Problem: PAIN - ADULT  Goal: Verbalizes/displays adequate comfort level or baseline comfort level  Interventions:  - Encourage patient to monitor pain and request assistance  - Assess pain using appropriate pain scale  - Administer analgesics based on type and severity of pain and evaluate response  - Implement non-pharmacological measures as appropriate and evaluate response  - Consider cultural and social influences on pain and pain management  - Notify physician/advanced practitioner if interventions unsuccessful or patient reports new pain   Outcome: Progressing      Problem: SAFETY ADULT  Goal: Maintain or return to baseline ADL function  INTERVENTIONS:  -  Assess patient's ability to carry out ADLs; assess patient's baseline for ADL function and identify physical deficits which impact ability to perform ADLs (bathing, care of mouth/teeth, toileting, grooming, dressing, etc )  - Assess/evaluate cause of self-care deficits   - Assess range of motion  - Assess patient's mobility; develop plan if impaired  - Assess patient's need for assistive devices and provide as appropriate  - Encourage maximum independence but intervene and supervise when necessary  ¯ Involve family in performance of ADLs  ¯ Assess for home care needs following discharge   ¯ Request OT consult to assist with ADL evaluation and planning for discharge  ¯ Provide patient education as appropriate    Outcome: Progressing    Goal: Maintain or return mobility status to optimal level  INTERVENTIONS:  - Assess patient's baseline mobility status (ambulation, transfers, stairs, etc )    - Identify cognitive and physical deficits and behaviors that affect mobility  - Identify mobility aids required to assist with transfers and/or ambulation (gait belt, sit-to-stand, lift, walker, cane, etc )  - [place] fall precautions as indicated by assessment  - Record patient progress and toleration of activity level on Mobility SBAR; progress patient to next Phase/Stage  - Instruct patient to call for assistance with activity based on assessment  - Request Rehabilitation consult to assist with strengthening/weightbearing, etc     Outcome: Progressing    Goal: Patient will remain free of falls  INTERVENTIONS:  - Assess patient frequently for physical needs  -  Identify cognitive and physical deficits and behaviors that affect risk of falls  -  [place] fall precautions as indicated by assessment  High fall risk    - Educate patient/family on patient safety including physical limitations  - Instruct patient to call for assistance with activity based on assessment  - Modify environment to reduce risk of injury  - Consider OT/PT consult to assist with strengthening/mobility   INTERVENTIONS:  - Assess patient frequently for physical needs  -  Identify cognitive and physical deficits and behaviors that affect risk of falls    -  [place] fall precautions as indicated by assessment   - Educate patient/family on patient safety including physical limitations  - Instruct patient to call for assistance with activity based on assessment  - Modify environment to reduce risk of injury  - Consider OT/PT consult to assist with strengthening/mobility   Outcome: Progressing      Problem: DISCHARGE PLANNING  Goal: Discharge to home or other facility with appropriate resources  INTERVENTIONS:  - Identify barriers to discharge w/patient and caregiver  - Arrange for needed discharge resources and transportation as appropriate  - Identify discharge learning needs (meds, wound care, etc )  - Arrange for interpretive services to assist at discharge as needed  - Refer to Case Management Department for coordinating discharge planning if the patient needs post-hospital services based on physician/advanced practitioner order or complex needs related to functional status, cognitive ability, or social support system   Outcome: Progressing      Problem: Knowledge Deficit  Goal: Patient/family/caregiver demonstrates understanding of disease process, treatment plan, medications, and discharge instructions  Complete learning assessment and assess knowledge base  Interventions:  - Provide teaching at level of understanding  - Provide teaching via preferred learning methods    Outcome: Progressing      Problem: Nutrition/Hydration-ADULT  Goal: Nutrient/Hydration intake appropriate for improving, restoring or maintaining nutritional needs  Monitor and assess patient's nutrition/hydration status for malnutrition (ex- brittle hair, bruises, dry skin, pale skin and conjunctiva, muscle wasting, smooth red tongue, and disorientation)  Collaborate with interdisciplinary team and initiate plan and interventions as ordered  Monitor patient's weight and dietary intake as ordered or per policy  Utilize nutrition screening tool and intervene per policy  Determine patient's food preferences and provide high-protein, high-caloric foods as appropriate       INTERVENTIONS:  - Monitor oral intake, urinary output, labs, and treatment plans  - Assess nutrition and hydration status and recommend course of action  - Evaluate amount of meals eaten  - Assist patient with eating if necessary   - Allow adequate time for meals  - Recommend/ encourage appropriate diets, oral nutritional supplements, and vitamin/mineral supplements  - Order, calculate, and assess calorie counts as needed  - Recommend, monitor, and adjust tube feedings and TPN/PPN based on assessed needs  - Assess need for intravenous fluids  - Provide specific nutrition/hydration education as appropriate  - Include patient/family/caregiver in decisions related to nutrition   Outcome: Progressing      Problem: DISCHARGE PLANNING - CARE MANAGEMENT  Goal: Discharge to post-acute care or home with appropriate resources  INTERVENTIONS:  - Conduct assessment to determine patient/family and health care team treatment goals, and need for post-acute services based on payer coverage, community resources, and patient preferences, and barriers to discharge  - Address psychosocial, clinical, and financial barriers to discharge as identified in assessment in conjunction with the patient/family and health care team  - Arrange appropriate level of post-acute services according to patient's   needs and preference and payer coverage in collaboration with the physician and health care team  - Communicate with and update the patient/family, physician, and health care team regarding progress on the discharge plan  - Arrange appropriate transportation to post-acute venues   Outcome: Progressing      Problem: INFECTION - ADULT  Goal: Absence or prevention of progression during hospitalization  INTERVENTIONS:  - Assess and monitor for signs and symptoms of infection  - Monitor lab/diagnostic results  - Monitor all insertion sites, i e  indwelling lines, tubes, and drains  - [place] appropriate cooling/warming therapies per order  - Administer medications as ordered  - Instruct and encourage patient and family to use good hand hygiene technique  - Identify and instruct in appropriate isolation precautions for identified infection/condition   Outcome: Progressing    Goal: Absence of fever/infection during neutropenic period  INTERVENTIONS:  - Monitor WBC   Outcome: Progressing

## 2018-07-15 NOTE — PROGRESS NOTES
Progress Note - Nephrology   Wagner Arellano 82 y o  female MRN: [id number]  Unit/Bed#: 417-01 Encounter: [id number]    A/P:  1  Acute kidney injury due to volume depletion in the setting of an abscess-resolved  2  Hyponatremia:    Mild continue monitor for now  Consider fluid restriction if indicated will check a urine osmolality  3   Hypokalemia   This morning's blood work is pending, she is status post supplementation yesterday follow-up lab results  4  Metabolic alkalosis:  Resolved  5  Hypertension   Will increase metoprolol to 100 mg p  o  b i d , keep an eye on the patient's heart rate and blood pressure  Consider increasing doxazosin in the evening, however, given the patient's age of 82 she is at higher risk of syncope/collapse with this alpha antagonist     6   Partial small bowel obstruction: improving and on clear liquids  7   Iron deficiency Anemia: being followed with serial studies    Her voice use of IV iron at this time due to active bacteremia  8   Methicillin sensitive Staphylococcus aureus bacteremia: on Cefazolin   Patient is on high-dose Cefazolin treatment, continue according to Infectious Disease recommendations      Follow up reason for today's visit: Acute kidney injury    Bacteremia due to methicillin susceptible Staphylococcus aureus (MSSA)    Patient Active Problem List   Diagnosis    Dislocation of shoulder region, right, initial encounter    Acute respiratory failure with hypoxia and hypercapnia (HCC)    GI bleed    Hyperlipidemia    Atrial fibrillation (HCC)    Anemia    Acute blood loss anemia    Vaginal prolapse    Elevated troponin    Hematochezia    Urinary retention    Rectal prolapse    Generalized weakness    Acute kidney injury (HCC)    Hyponatremia    Partial small bowel obstruction (HCC)    Hyperglycemia    Leukocytosis    Bacteremia due to methicillin susceptible Staphylococcus aureus (MSSA)    Pressure ulcer of sacral region, stage 2    Abscess of axilla, right    Uncontrolled stage 2 hypertension         Subjective:   No acute events overnight  Objective:     Vitals: Blood pressure (!) 173/75, pulse 77, temperature 97 8 °F (36 6 °C), temperature source Temporal, resp  rate 18, height 4' 8" (1 422 m), weight 56 1 kg (123 lb 10 9 oz), SpO2 97 %  ,Body mass index is 27 73 kg/m²  Weight (last 2 days)     Date/Time   Weight    07/15/18 0555  56 1 (123 68)    07/14/18 0551  55 (121 25)    07/13/18 0559  55 (121 25)                Intake/Output Summary (Last 24 hours) at 07/15/18 0945  Last data filed at 07/15/18 0824   Gross per 24 hour   Intake              420 ml   Output              650 ml   Net             -230 ml     I/O last 3 completed shifts: In: 540 [P O :540]  Out: 1100 [Urine:1100]         Physical Exam: BP (!) 173/75 (BP Location: Left arm)   Pulse 77   Temp 97 8 °F (36 6 °C) (Temporal)   Resp 18   Ht 4' 8" (1 422 m) Comment: There appears to be some discrepancy in accurate ht  Per chart review:  4'2"    However, pt states 4'9" and pt's family reports 4'8"  Wt 56 1 kg (123 lb 10 9 oz)   SpO2 97%   BMI 27 73 kg/m²     General Appearance:    Alert, cooperative, no distress, appears stated age   Head:    Normocephalic, without obvious abnormality, atraumatic   Eyes:    Conjunctiva/corneas clear   Ears:    Normal external ears   Nose:   Nares normal, septum midline, mucosa normal, no drainage    or sinus tenderness   Throat:   Lips, mucosa, and tongue normal; teeth and gums normal   Neck:   Supple, symmetrical, trachea midline, no adenopathy;        thyroid:  No enlargement/tenderness/nodules; no carotid    bruit or JVD   Back:     Symmetric, no curvature, ROM normal, no CVA tenderness   Lungs:     Dec to auscultation bilaterally, respirations unlabored   Chest wall:    No tenderness or deformity   Heart:    Regular rate and rhythm, S1 and S2 normal, no murmur, rub   or gallop   Abdomen:     Soft, non-tender, bowel sounds present Extremities:   Extremities normal, atraumatic, no cyanosis or edema   Skin:   Skin color, texture, turgor normal, no rashes or lesions   Lymph nodes:   Cervical normal   Neurologic:   CNII-XII intact            Lab, Imaging and other studies: I have personally reviewed pertinent labs  CBC:   Lab Results   Component Value Date    WBC 6 16 07/14/2018    HGB 8 6 (L) 07/14/2018    HCT 26 9 (L) 07/14/2018    MCV 92 07/14/2018     07/14/2018    MCH 29 6 07/14/2018    MCHC 32 0 07/14/2018    RDW 15 8 (H) 07/14/2018    MPV 11 4 07/14/2018     CMP:   Lab Results   Component Value Date     (L) 07/14/2018    K 3 6 07/14/2018     07/14/2018    CO2 21 07/14/2018    ANIONGAP 9 07/14/2018    BUN 12 07/14/2018    CREATININE 1 07 07/14/2018    GLUCOSE 141 (H) 07/14/2018    CALCIUM 8 6 07/14/2018    EGFR 49 07/14/2018             Results from last 7 days  Lab Units 07/14/18  1849 07/13/18  0528 07/11/18  0512 07/10/18  0626 07/09/18  0449   SODIUM mmol/L 134* 139 134* 136 137   POTASSIUM mmol/L 3 6 3 3* 3 6 3 9 4 1   CHLORIDE mmol/L 104 109* 106 107 105   CO2 mmol/L 21 21 20* 21 23   BUN mg/dL 12 16 26* 32* 45*   CREATININE mg/dL 1 07 1 12 1 25 1 30 1 96*   CALCIUM mg/dL 8 6 8 0* 7 8* 7 6* 8 0*   TOTAL PROTEIN g/dL  --   --  5 1* 5 3* 5 8*   BILIRUBIN TOTAL mg/dL  --   --  0 40 0 50 0 60   ALK PHOS U/L  --   --  70 65 68   ALT U/L  --   --  14 15 15   AST U/L  --   --  14 14 19   GLUCOSE RANDOM mg/dL 141* 99 115 128 120         Phosphorus: No results found for: PHOS  Magnesium:   No results found for: MG  Urinalysis: No results found for: COLORU, CLARITYU, SPECGRAV, PHUR, LEUKOCYTESUR, NITRITE, PROTEINUA, GLUCOSEU, KETONESU, BILIRUBINUR, BLOODU  Ionized Calcium: No results found for: CAION  Coagulation: No results found for: PT, INR, APTT  Troponin: No results found for: TROPONINI  ABG: No results found for: PHART, MSJ2ZGV, PO2ART, HTH1NEB, Y1HBRQKB, BEART, SOURCE  Radiology review:     IMAGING  Procedure: Ct Chest Abdomen Pelvis Wo Contrast    Result Date: 7/8/2018  Narrative: CT CHEST, ABDOMEN AND PELVIS WITHOUT IV CONTRAST INDICATION:   Shortness of breath, abdominal pain, hypotensive, renal failure  COMPARISON: 4/12/2018  TECHNIQUE: CT examination of the chest, abdomen and pelvis was performed without intravenous contrast   Axial, sagittal, and coronal 2D reformatted images were created from the source data and submitted for interpretation  Radiation dose length product (DLP) for this visit:  281 88 mGy-cm   This examination, like all CT scans performed in the [facility], was performed utilizing techniques to minimize radiation dose exposure, including the use of iterative  reconstruction and automated exposure control  Enteric contrast was administered  FINDINGS: CHEST LUNGS:  Lungs are clear  There is no tracheal or endobronchial lesion  PLEURA:  Unremarkable  HEART/GREAT VESSELS:  Normal heart size  Coronary artery calcifications noted  Normal caliber thoracic aorta with mild atherosclerotic calcifications  MEDIASTINUM AND BABAR:  Unremarkable  CHEST WALL AND LOWER NECK:   Multiple chronic right rib fractures again identified  There is a fairly well-circumscribed 5 4 x 4 9 cm collection of simple fluid in the right axillary region, which may represent a seroma or resolving hematoma  ABDOMEN Absence of intravenous contrast enhancement limits evaluation of the solid abdominal viscera  LIVER/BILIARY TREE:  Unremarkable  GALLBLADDER:  No calcified gallstones  No pericholecystic inflammatory change  SPLEEN:  Unremarkable  PANCREAS:  Unremarkable  ADRENAL GLANDS:  Unremarkable  KIDNEYS/URETERS:  Unremarkable  No hydronephrosis  STOMACH AND BOWEL:  Several mildly dilated small bowel loops in the lower abdomen to the level of a herniated loop of bowel in a small right paramedian ventral hernia in the lower abdomen, concerning for early or partial small bowel obstruction   APPENDIX:  No findings to suggest appendicitis  ABDOMINOPELVIC CAVITY:  No ascites or free intraperitoneal air  No lymphadenopathy  VESSELS:  Unremarkable for patient's age  PELVIS REPRODUCTIVE ORGANS:  Unremarkable for patient's age  URINARY BLADDER:  Unremarkable  ABDOMINAL WALL/INGUINAL REGIONS:  Small right paramedian ventral hernia in the lower abdomen containing a loop of small bowel, as above  This is new since the prior exam  OSSEOUS STRUCTURES:  No acute fracture or destructive osseous lesion  Chronic fractures of the right ribs, bilateral sacral ala, and left pubic rami appear unchanged  Chronic right shoulder dislocation appears unchanged  Unchanged vertebral wedge deformity of L2  Impression: Findings concerning for early or partial small bowel obstruction related to a herniated small bowel loop within a right lower quadrant ventral hernia  5 4 cm fluid collection in the right axilla, new since the prior study  This may represent a seroma or resolving hematoma  Multiple chronic right rib and pelvic fractures  Chronic right shoulder dislocation also again identified  I personally discussed this study with Coralie Mcardle on 7/8/2018 at 3:37 PM  Workstation performed: [id number]     Procedure: Us Kidney And Bladder    Result Date: 7/9/2018  Narrative: RENAL ULTRASOUND INDICATION:   acute renal failure  COMPARISON: None TECHNIQUE:   Ultrasound of the retroperitoneum was performed with a curvilinear transducer utilizing volumetric sweeps and still imaging techniques  FINDINGS: KIDNEYS: Symmetric and normal size  Right kidney:  10 3 x 4 3 cm  Left kidney:  9 5 x 4 9 cm  Right kidney Normal echogenicity and contour  No suspicious masses detected  Multiple simple cortical cysts (largest 1 3 cm)  No hydronephrosis  No shadowing calculi  Small right perirenal fluid collection  Left kidney Normal echogenicity and contour  No suspicious masses detected  No hydronephrosis  No shadowing calculi  No perinephric fluid collections  URETERS: Nonvisualized  BLADDER: Normally distended  No focal thickening or mass lesions  Left ureteral jet detected  Impression: Negative for evidence of obstructive uropathy  Small right perinephric fluid collection, nonspecific (but may be related to infection)  Workstation performed: [id number]     Procedure: Us Extremity Soft Tissue    Result Date: 7/9/2018  Narrative: UPPER EXTREMITY ULTRASOUND INDICATION:  Right axillary lump COMPARISON:  None  FINDINGS: Ultrasound evaluation of the right axilla performed to evaluate lump  A 5 6 x 4 9 x 5 7 cm flex, primarily cystic lesion with irregular borders, internal debris and enhanced through transmission is seen in the right axilla at the site of clinical concern  Impression: Complex right axillary lesion likely a abscess or complicated cyst/hematoma  Correlation with the clinical findings recommended in this regard and, if warranted, short interval follow-up following appropriate clinical therapy suggested   Workstation performed: [id number]       Current Facility-Administered Medications   Medication Dose Route Frequency    acetaminophen (TYLENOL) tablet 650 mg  650 mg Oral Q6H PRN    ascorbic acid (VITAMIN C) tablet 500 mg  500 mg Oral Daily    atorvastatin (LIPITOR) tablet 20 mg  20 mg Oral Daily With Dinner    ceFAZolin (ANCEF) IVPB (premix) 2,000 mg  2,000 mg Intravenous Q8H    doxazosin (CARDURA) tablet 4 mg  4 mg Oral HS    ferrous sulfate oral elixer 300 mg  300 mg Oral Daily    hydrocortisone (ANUSOL-HC, PROCTOSOL HC)) 2 5 % rectal cream   Rectal BID    menthol-zinc oxide (CALMOSEPTINE) 0 44-20 6 % ointment   Topical BID    metoprolol tartrate (LOPRESSOR) tablet 50 mg  50 mg Oral Q12H [address]    ondansetron (ZOFRAN) injection 4 mg  4 mg Intravenous Q6H PRN    oxybutynin (DITROPAN) tablet 5 mg  5 mg Oral Daily    pantoprazole (PROTONIX) EC tablet 40 mg  40 mg Oral BID AC    senna-docusate sodium (SENOKOT S) 8 6-50 mg per tablet 1 tablet  1 tablet Oral BID    valsartan (DIOVAN) tablet 160 mg  160 mg Oral BID     Medications Discontinued During This Encounter   Medication Reason    sodium chloride 0 9 % infusion     losartan potassium-hydrochlorothiazide (HYZAAR 100/12  5) combo dose     hydrocortisone-pramoxine (ANALPRAM-HC) 2 5-1 % rectal cream     vancomycin (VANCOCIN) 750 mg in sodium chloride 0 9 % 250 mL IVPB     sodium chloride 0 9 % infusion     sodium chloride 0 9 % irrigation solution Patient Discharge    bupivacaine (MARCAINE) 0 5 % injection Patient Discharge    ondansetron (ZOFRAN) injection 4 mg Patient Transfer    vancomycin (VANCOCIN) 750 mg in sodium chloride 0 9 % 250 mL IVPB     lactated ringers infusion     valsartan (DIOVAN) tablet 40 mg     valsartan (DIOVAN) tablet 80 mg        Karolee Cheadle, DO

## 2018-07-15 NOTE — PROGRESS NOTES
[address] Internal Medicine Progress Note  Patient: Lina Dan 82 y o  female   MRN: [id number]  PCP: Emerita Lieberman DO  Unit/Bed#: 527-39 Encounter: [id number]  Date Of Visit: 07/15/18    Assessment:    Principal Problem:    Bacteremia due to methicillin susceptible Staphylococcus aureus (MSSA)  Active Problems:    Hyperlipidemia    Acute blood loss anemia    Rectal prolapse    Generalized weakness    Acute kidney injury ([address] )    Hyponatremia    Partial small bowel obstruction (HCC)    Hyperglycemia    Leukocytosis    Pressure ulcer of sacral region, stage 2    Abscess of axilla, right    Uncontrolled stage 2 hypertension      Plan:    · MSSA bacteremia - source most likely R axilla abscess/infected hematoma, IE less likely  · R axilla abscess - s/p I+D 7/12/18 by Dr Ac Grier, wound culture MSSA +ve  · Acute on chronic anemia - improving with iron supplement, suspect chronic iron deficiency anemia with ABLA due to hematochezia  · Rectal prolapse - likely source of occult chronic GI bleed  · NANETTE - resolved, baseline eGFR 51 4/13/18  · Hyponatremia - POA, resolved with IV fluids  · HTN - on beta blocker & ARB, diuretic on hold  · Generalized weakness - requires discharge to SNF for Rehab     -wound care per surg team  -PICC placement tomorrow or next for 2-4 weeks of IV cefazolin 2g q8h if blood cultures drawn 7/14/18 show no growth at 48hrs, otherwise MARTINEZ if persistent bacteremia  -monitor CBC & renal fxn  -optimized ARB dose, cont lopressor at 50mg bid for now( potential for iatrogenic bradycardia in this elderly patient )      VTE Pharmacologic Prophylaxis:   Pharmacologic: Pharmacologic VTE Prophylaxis contraindicated due to ABLA  Mechanical VTE Prophylaxis in Place: Yes    Patient Centered Rounds: I have performed bedside rounds with nursing staff today      Current Length of Stay: 7 day(s)    Current Patient Status: Inpatient     Code Status: Level 1 - Full Code      Subjective:   Patient seen & examined this AM, has no new complaints  Serous drainage from R axilla surgical wound    Objective:     Vitals:   Temp (24hrs), Av 1 °F (36 7 °C), Min:97 8 °F (36 6 °C), Max:98 4 °F (36 9 °C)    HR:  [69-85] 83  Resp:  [16-18] 16  BP: (140-188)/(66-82) 157/77  SpO2:  [97 %-99 %] 97 %  Body mass index is 27 73 kg/m²  Input and Output Summary (last 24 hours): Intake/Output Summary (Last 24 hours) at 07/15/18 1316  Last data filed at 07/15/18 1231   Gross per 24 hour   Intake              360 ml   Output             1025 ml   Net             -665 ml       Physical Exam:   General: in no overt painful distress  HEENT: not pale, not jaundiced  Skin: R axilla abscess I+D wound expressing serous fluid  Psych: appropriately oriented in all spheres  Neuro: Awake, alert, essentially nonfocal      Additional Data:     Labs:      Results from last 7 days  Lab Units 18  1848 18  0528   WBC Thousand/uL 6 16 5 56   HEMOGLOBIN g/dL 8 6* 7 4*   HEMATOCRIT % 26 9* 23 9*   PLATELETS Thousands/uL 154 143*   NEUTROS PCT %  --  75   LYMPHS PCT %  --  15   MONOS PCT %  --  9   EOS PCT %  --  2       Results from last 7 days  Lab Units 18  1849  18  0512   SODIUM mmol/L 134*  < > 134*   POTASSIUM mmol/L 3 6  < > 3 6   CHLORIDE mmol/L 104  < > 106   CO2 mmol/L 21  < > 20*   BUN mg/dL 12  < > 26*   CREATININE mg/dL 1 07  < > 1 25   CALCIUM mg/dL 8 6  < > 7 8*   TOTAL PROTEIN g/dL  --   --  5 1*   BILIRUBIN TOTAL mg/dL  --   --  0 40   ALK PHOS U/L  --   --  70   ALT U/L  --   --  14   AST U/L  --   --  14   GLUCOSE RANDOM mg/dL 141*  < > 115   < > = values in this interval not displayed  Results from last 7 days  Lab Units 18  0449   INR  1 11     Invalid input(s): TROIP    * I Have Reviewed All Lab Data Listed Above  * Additional Pertinent Lab Tests Reviewed:  All Labs Within Last 24 Hours Reviewed      Recent Cultures (last 7 days):       Results from last 7 days  Lab Units 18  5582 07/11/18  1228 07/08/18  2323   BLOOD CULTURE   --  No Growth at 72 hrs  Staphylococcus aureus*  Staphylococcus aureus*   GRAM STAIN RESULT  No polys seen  No bacteria seen  --  Gram positive cocci in clusters  Gram positive cocci in clusters   WOUND CULTURE  3+ Growth of Staphylococcus aureus*  --   --        Last 24 Hours Medication List:     Current Facility-Administered Medications:  acetaminophen 650 mg Oral Q6H PRN Arpit Vickers PA-C    ascorbic acid 500 mg Oral Daily Lolly Sprague MD    atorvastatin 20 mg Oral Daily With Dinner Arpit Vickers PA-C    cefazolin 2,000 mg Intravenous Q8H Lolly Sprague MD Last Rate: 2,000 mg (07/15/18 0807)   doxazosin 4 mg Oral HS Arpit Vickers PA-C    ferrous sulfate 300 mg Oral Daily Lolly Sprague MD    hydrocortisone  Rectal BID Arpit Vickers PA-C    menthol-zinc oxide  Topical BID Sophia Duran MD    metoprolol tartrate 100 mg Oral Q12H [address] Samson Valera DO    ondansetron 4 mg Intravenous Q6H PRN Arpit Vickers PA-C    oxybutynin 5 mg Oral Daily Arpit Vickers PA-C    pantoprazole 40 mg Oral BID AC Arpit Vickers PA-C    senna-docusate sodium 1 tablet Oral BID Lolly Sprague MD    valsartan 160 mg Oral BID Lolly Sprague MD         Today, Patient Was Seen By: Lolly Sprague MD    ** Please Note: Dragon 360 Dictation voice to text software may have been used in the creation of this document   **

## 2018-07-16 NOTE — PROGRESS NOTES
Progress Note - Nephrology   Nini Lancaster 82 y o  female MRN: [id number]  Unit/Bed#: 417-01 Encounter: [id number]    A/P:  1  Acute kidney injury due to volume depletion in the setting of an abscess-resolved  2  Hyponatremia:    Mild continue monitor for now  Consider fluid restriction if indicated will check a urine osmolality  3   Hypokalemia   This morning's blood work is pending, she is status post supplementation yesterday follow-up lab results  4  Metabolic alkalosis:  Resolved  5  Hypertension   Metoprolol was decreased back down to 50 mg p o  b i d  unclear reason, but, for now will start the patient on amlodipine 5 mg daily in addition to her current regimen  6   Urinary retention   Patient had Ochoa catheter inserted over the last 24 hr due to failing to achieve appropriate postvoid residuals  Continue to monitor,  consider obtaining a urology consultation for guidance regarding course of care for the Ochoa catheter  7   Partial small bowel obstruction: improving and on clear liquids  8   Iron deficiency Anemia: being followed with serial studies    Avoid use of IV iron at this time due to active bacteremia  9   Methicillin sensitive Staphylococcus aureus bacteremia: on Cefazolin   Patient is on high-dose Cefazolin treatment, continue according to Infectious Disease recommendations      Follow up reason for today's visit: Acute kidney injury    Bacteremia due to methicillin susceptible Staphylococcus aureus (MSSA)    Patient Active Problem List   Diagnosis    Dislocation of shoulder region, right, initial encounter    Acute respiratory failure with hypoxia and hypercapnia (HCC)    GI bleed    Hyperlipidemia    Atrial fibrillation (HCC)    Anemia    Acute blood loss anemia    Vaginal prolapse    Elevated troponin    Hematochezia    Urinary retention    Rectal prolapse    Generalized weakness    Acute kidney injury ([address] )    Hyponatremia    Partial small bowel obstruction ([address] )    Hyperglycemia    Leukocytosis    Bacteremia due to methicillin susceptible Staphylococcus aureus (MSSA)    Pressure ulcer of sacral region, stage 2    Abscess of axilla, right    Uncontrolled stage 2 hypertension         Subjective:   No acute events overnight  Objective:     Vitals: Blood pressure 156/68, pulse 75, temperature 98 2 °F (36 8 °C), temperature source Temporal, resp  rate 18, height 4' 8" (1 422 m), weight 58 3 kg (128 lb 8 5 oz), SpO2 96 %  ,Body mass index is 28 82 kg/m²  Weight (last 2 days)     Date/Time   Weight    07/16/18 0600  58 3 (128 53)    07/15/18 0555  56 1 (123 68)    07/14/18 0551  55 (121 25)                Intake/Output Summary (Last 24 hours) at 07/16/18 0732  Last data filed at 07/16/18 0612   Gross per 24 hour   Intake              600 ml   Output             1150 ml   Net             -550 ml     I/O last 3 completed shifts: In: 600 [P O :600]  Out: 5578 [Urine:1575]         Physical Exam: /68 (BP Location: Left arm)   Pulse 75   Temp 98 2 °F (36 8 °C) (Temporal)   Resp 18   Ht 4' 8" (1 422 m) Comment: There appears to be some discrepancy in accurate ht  Per chart review:  4'2"    However, pt states 4'9" and pt's family reports 4'8"  Wt 58 3 kg (128 lb 8 5 oz)   SpO2 96%   BMI 28 82 kg/m²     General Appearance:    Alert, cooperative, no distress, appears stated age   Head:    Normocephalic, without obvious abnormality, atraumatic   Eyes:    Conjunctiva/corneas clear   Ears:    Normal external ears   Nose:   Nares normal, septum midline, mucosa normal, no drainage    or sinus tenderness   Throat:   Lips, mucosa, and tongue normal; teeth and gums normal   Neck:   Supple, symmetrical, trachea midline, no adenopathy;        thyroid:  No enlargement/tenderness/nodules; no carotid    bruit or JVD   Back:     Symmetric, no curvature, ROM normal, no CVA tenderness   Lungs:     Dec to auscultation bilaterally, respirations unlabored   Chest wall:    No tenderness or deformity   Heart:    Regular rate and rhythm, S1 and S2 normal, no murmur, rub   or gallop   Abdomen:     Soft, non-tender, bowel sounds present   Extremities:   Extremities normal, atraumatic, no cyanosis or edema   Skin:   Skin color, texture, turgor normal, no rashes or lesions   Lymph nodes:   Cervical normal   Neurologic:   CNII-XII intact            Lab, Imaging and other studies: I have personally reviewed pertinent labs  CBC:   No results found for: WBC, HGB, HCT, MCV, PLT, ADJUSTEDWBC, MCH, MCHC, RDW, MPV, NRBC  CMP:   No results found for: NA, K, CL, CO2, ANIONGAP, BUN, CREATININE, GLUCOSE, CALCIUM, AST, ALT, ALKPHOS, PROT, ALBUMIN, BILITOT, EGFR      Results from last 7 days  Lab Units 07/14/18  1849 07/13/18  0528 07/11/18  0512 07/10/18  0626   SODIUM mmol/L 134* 139 134* 136   POTASSIUM mmol/L 3 6 3 3* 3 6 3 9   CHLORIDE mmol/L 104 109* 106 107   CO2 mmol/L 21 21 20* 21   BUN mg/dL 12 16 26* 32*   CREATININE mg/dL 1 07 1 12 1 25 1 30   CALCIUM mg/dL 8 6 8 0* 7 8* 7 6*   TOTAL PROTEIN g/dL  --   --  5 1* 5 3*   BILIRUBIN TOTAL mg/dL  --   --  0 40 0 50   ALK PHOS U/L  --   --  70 65   ALT U/L  --   --  14 15   AST U/L  --   --  14 14   GLUCOSE RANDOM mg/dL 141* 99 115 128         Phosphorus: No results found for: PHOS  Magnesium:   No results found for: MG  Urinalysis: No results found for: COLORU, CLARITYU, SPECGRAV, PHUR, LEUKOCYTESUR, NITRITE, PROTEINUA, GLUCOSEU, KETONESU, BILIRUBINUR, BLOODU  Ionized Calcium: No results found for: CAION  Coagulation: No results found for: PT, INR, APTT  Troponin: No results found for: TROPONINI  ABG: No results found for: PHART, TDC4DPB, PO2ART, BMB7ZNV, G9CPPVDR, BEART, SOURCE  Radiology review:     IMAGING  Procedure: Ct Chest Abdomen Pelvis Wo Contrast    Result Date: 7/8/2018  Narrative: CT CHEST, ABDOMEN AND PELVIS WITHOUT IV CONTRAST INDICATION:   Shortness of breath, abdominal pain, hypotensive, renal failure  COMPARISON: 4/12/2018   TECHNIQUE: CT examination of the chest, abdomen and pelvis was performed without intravenous contrast   Axial, sagittal, and coronal 2D reformatted images were created from the source data and submitted for interpretation  Radiation dose length product (DLP) for this visit:  281 88 mGy-cm   This examination, like all CT scans performed in the [facility], was performed utilizing techniques to minimize radiation dose exposure, including the use of iterative  reconstruction and automated exposure control  Enteric contrast was administered  FINDINGS: CHEST LUNGS:  Lungs are clear  There is no tracheal or endobronchial lesion  PLEURA:  Unremarkable  HEART/GREAT VESSELS:  Normal heart size  Coronary artery calcifications noted  Normal caliber thoracic aorta with mild atherosclerotic calcifications  MEDIASTINUM AND BABAR:  Unremarkable  CHEST WALL AND LOWER NECK:   Multiple chronic right rib fractures again identified  There is a fairly well-circumscribed 5 4 x 4 9 cm collection of simple fluid in the right axillary region, which may represent a seroma or resolving hematoma  ABDOMEN Absence of intravenous contrast enhancement limits evaluation of the solid abdominal viscera  LIVER/BILIARY TREE:  Unremarkable  GALLBLADDER:  No calcified gallstones  No pericholecystic inflammatory change  SPLEEN:  Unremarkable  PANCREAS:  Unremarkable  ADRENAL GLANDS:  Unremarkable  KIDNEYS/URETERS:  Unremarkable  No hydronephrosis  STOMACH AND BOWEL:  Several mildly dilated small bowel loops in the lower abdomen to the level of a herniated loop of bowel in a small right paramedian ventral hernia in the lower abdomen, concerning for early or partial small bowel obstruction  APPENDIX:  No findings to suggest appendicitis  ABDOMINOPELVIC CAVITY:  No ascites or free intraperitoneal air  No lymphadenopathy  VESSELS:  Unremarkable for patient's age  PELVIS REPRODUCTIVE ORGANS:  Unremarkable for patient's age   URINARY BLADDER: Unremarkable  ABDOMINAL WALL/INGUINAL REGIONS:  Small right paramedian ventral hernia in the lower abdomen containing a loop of small bowel, as above  This is new since the prior exam  OSSEOUS STRUCTURES:  No acute fracture or destructive osseous lesion  Chronic fractures of the right ribs, bilateral sacral ala, and left pubic rami appear unchanged  Chronic right shoulder dislocation appears unchanged  Unchanged vertebral wedge deformity of L2  Impression: Findings concerning for early or partial small bowel obstruction related to a herniated small bowel loop within a right lower quadrant ventral hernia  5 4 cm fluid collection in the right axilla, new since the prior study  This may represent a seroma or resolving hematoma  Multiple chronic right rib and pelvic fractures  Chronic right shoulder dislocation also again identified  I personally discussed this study with Tomasa Fontenot on 7/8/2018 at 3:37 PM  Workstation performed: [id number]     Procedure: Us Kidney And Bladder    Result Date: 7/9/2018  Narrative: RENAL ULTRASOUND INDICATION:   acute renal failure  COMPARISON: None TECHNIQUE:   Ultrasound of the retroperitoneum was performed with a curvilinear transducer utilizing volumetric sweeps and still imaging techniques  FINDINGS: KIDNEYS: Symmetric and normal size  Right kidney:  10 3 x 4 3 cm  Left kidney:  9 5 x 4 9 cm  Right kidney Normal echogenicity and contour  No suspicious masses detected  Multiple simple cortical cysts (largest 1 3 cm)  No hydronephrosis  No shadowing calculi  Small right perirenal fluid collection  Left kidney Normal echogenicity and contour  No suspicious masses detected  No hydronephrosis  No shadowing calculi  No perinephric fluid collections  URETERS: Nonvisualized  BLADDER: Normally distended  No focal thickening or mass lesions  Left ureteral jet detected  Impression: Negative for evidence of obstructive uropathy   Small right perinephric fluid collection, nonspecific (but may be related to infection)  Workstation performed: [id number]     Procedure: Us Extremity Soft Tissue    Result Date: 7/9/2018  Narrative: UPPER EXTREMITY ULTRASOUND INDICATION:  Right axillary lump COMPARISON:  None  FINDINGS: Ultrasound evaluation of the right axilla performed to evaluate lump  A 5 6 x 4 9 x 5 7 cm flex, primarily cystic lesion with irregular borders, internal debris and enhanced through transmission is seen in the right axilla at the site of clinical concern  Impression: Complex right axillary lesion likely a abscess or complicated cyst/hematoma  Correlation with the clinical findings recommended in this regard and, if warranted, short interval follow-up following appropriate clinical therapy suggested   Workstation performed: [id number]       Current Facility-Administered Medications   Medication Dose Route Frequency    acetaminophen (TYLENOL) tablet 650 mg  650 mg Oral Q6H PRN    amLODIPine (NORVASC) tablet 5 mg  5 mg Oral Daily    ascorbic acid (VITAMIN C) tablet 500 mg  500 mg Oral Daily    atorvastatin (LIPITOR) tablet 20 mg  20 mg Oral Daily With Dinner    ceFAZolin (ANCEF) IVPB (premix) 2,000 mg  2,000 mg Intravenous Q8H    doxazosin (CARDURA) tablet 4 mg  4 mg Oral HS    ferrous sulfate oral elixer 300 mg  300 mg Oral Daily    hydrocortisone (ANUSOL-HC, PROCTOSOL HC)) 2 5 % rectal cream   Rectal BID    menthol-zinc oxide (CALMOSEPTINE) 0 44-20 6 % ointment   Topical BID    metoprolol tartrate (LOPRESSOR) tablet 50 mg  50 mg Oral Q12H [address]    ondansetron (ZOFRAN) injection 4 mg  4 mg Intravenous Q6H PRN    oxybutynin (DITROPAN) tablet 5 mg  5 mg Oral Daily    pantoprazole (PROTONIX) EC tablet 40 mg  40 mg Oral BID AC    senna-docusate sodium (SENOKOT S) 8 6-50 mg per tablet 1 tablet  1 tablet Oral BID    valsartan (DIOVAN) tablet 160 mg  160 mg Oral BID     Medications Discontinued During This Encounter   Medication Reason    sodium chloride 0 9 % infusion     losartan potassium-hydrochlorothiazide (HYZAAR 100/12  5) combo dose     hydrocortisone-pramoxine (ANALPRAM-HC) 2 5-1 % rectal cream     vancomycin (VANCOCIN) 750 mg in sodium chloride 0 9 % 250 mL IVPB     sodium chloride 0 9 % infusion     sodium chloride 0 9 % irrigation solution Patient Discharge    bupivacaine (MARCAINE) 0 5 % injection Patient Discharge    ondansetron (ZOFRAN) injection 4 mg Patient Transfer    vancomycin (VANCOCIN) 750 mg in sodium chloride 0 9 % 250 mL IVPB     lactated ringers infusion     valsartan (DIOVAN) tablet 40 mg     valsartan (DIOVAN) tablet 80 mg     metoprolol tartrate (LOPRESSOR) tablet 50 mg     metoprolol tartrate (LOPRESSOR) tablet 100 mg        Tiesha Farias DO

## 2018-07-16 NOTE — PROGRESS NOTES
Progress Note - Infectious Disease   Yajaira Cloud 82 y o  female MRN: [id number]  Unit/Bed#: 272-26 Encounter: [id number]      Assessment/Recommendations     82year old female with recent fall, right shoulder dislocation presents with Staph aureus bacteremia and axillary hematoma and/or abscess     1  MSSA bacteremia  - Source likely from axillary abscess   - Negative TTE, fu blood cultures clear, low suspicion for endocarditis     · Continue Cefazolin 2g iv q8h  · Follow repeat blood cultures  · Recommend 2 weeks of iv antibiotic therapy from  to   · PICC can be removed after completion of iv antibiotic therapy  · Patient will need weekly CBC, CMP faxed to the ID office, attn:Dr Zach Michael at [phone]   · Will need ID F/u appointment within 1 week of discharge by calling [phone]     2  Axillary abscess  - Likely followed infected hematoma from recent shoulder manipulation  - s/p I and D on , surgical cultures grew MSSA    · Wound care per surgery, antibiotics as above     3  Acute kidney injury, resolving  · Management per primary team and nephrology    Discussed with the primary service  History/Hospital Course     Subjective: The patient has no complaints  Denies fevers, chills, or sweats  Denies nausea, vomiting, or diarrhea  Interval Hospital Course:  New labs and microbiology reviewed and blood culture  grew MSSA as did wound culture from   FU blood cultures negative  New cardiology testing reviewed and TTE showed no vegetation  Antibiotics:  Vancomycin 7/10-  Cefazolin  - D5    Physical Exam     Objective:  Vitals:  HR:  [74-84] 74  Resp:  [16-18] 18  BP: (124-186)/(62-77) 162/72  SpO2:  [95 %-97 %] 96 %  Temp (24hrs), Av 3 °F (36 8 °C), Min:98 2 °F (36 8 °C), Max:98 4 °F (36 9 °C)  Current: Temperature: 98 2 °F (36 8 °C)    Physical Exam:     General Appearance:  Alert, nontoxic, no acute distress  Throat: Oropharynx moist without lesions    Lips, mucosa, and tongue normal   Neck: Supple, symmetrical, trachea midline, no adenopathy,  no tenderness/mass/nodules   Lungs:   Clear to auscultation bilaterally, no audible wheezes, rhonchi or rales; respirations unlabored   Heart:  Regular rate and rhythm, S1, S2 normal, no murmur, rub or gallop   Abdomen:   Soft, non-tender, non-distended, positive bowel sounds  No masses, no organomegaly    No CVA tenderness   Extremities: R axilla surgical dressing in place   Skin: Skin color, texture, turgor normal, no rashes or lesions  No draining wounds noted  Labs, Imaging, & Other Studies     Invasive Devices     Peripheral Intravenous Line            Peripheral IV 07/15/18 Left Forearm 1 day          Drain            Urethral Catheter Double-lumen 16 Fr  4 days                Labs, Imaging, & Other studies:   All pertinent labs, imaging, pathology and other studies were personally reviewed    Results from last 7 days  Lab Units 07/14/18  1848 07/13/18  0528 07/11/18  0512   WBC Thousand/uL 6 16 5 56 11 53*   HEMOGLOBIN g/dL 8 6* 7 4* 7 5*   PLATELETS Thousands/uL 154 143* 117*       Results from last 7 days  Lab Units 07/14/18  1849 07/13/18  0528 07/11/18  0512 07/10/18  0626   SODIUM mmol/L 134* 139 134* 136   POTASSIUM mmol/L 3 6 3 3* 3 6 3 9   CHLORIDE mmol/L 104 109* 106 107   CO2 mmol/L 21 21 20* 21   ANION GAP mmol/L 9 9 8 8   BUN mg/dL 12 16 26* 32*   CREATININE mg/dL 1 07 1 12 1 25 1 30   EGFR ml/min/1 73sq m 49 46 40 39   GLUCOSE RANDOM mg/dL 141* 99 115 128   CALCIUM mg/dL 8 6 8 0* 7 8* 7 6*   AST U/L  --   --  14 14   ALT U/L  --   --  14 15   ALK PHOS U/L  --   --  70 65   TOTAL PROTEIN g/dL  --   --  5 1* 5 3*   BILIRUBIN TOTAL mg/dL  --   --  0 40 0 50       Results from last 7 days  Lab Units 07/12/18  1319 07/11/18  1228   BLOOD CULTURE   --  No Growth After 4 Days     GRAM STAIN RESULT  No polys seen  No bacteria seen  --    WOUND CULTURE  3+ Growth of Staphylococcus aureus*  -- Counseling/Coordination of care:     Labs, medical tests and imaging studies were independently reviewed by me as noted above

## 2018-07-16 NOTE — ASSESSMENT & PLAN NOTE
-Reports generalized weakness X 2 days prior to admission   -short-term rehab when discharged  Cough x 1 1/2 weeks that is progressively becoming worse. Patient denies a fever and states that at times the cough is productive. Patient unsure what color the mucous is.  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 Patient als

## 2018-07-16 NOTE — SOCIAL WORK
No bed on the 5th floor, pt was accepted at [facility] for when she is medically ready for dc

## 2018-07-16 NOTE — ASSESSMENT & PLAN NOTE
-blood pressure elevated   -Lopressor and divan restarted on 7/15/18   -Norvasc restarted today   -continue to monitor

## 2018-07-16 NOTE — PROGRESS NOTES
Progress Note - General Surgery   Jeannine Bible 82 y o  female MRN: [id number]  Unit/Bed#: 417-01 Encounter: [id number]      Assessment:  Postop day #4  Status post incision and drainage abscess right axilla    MSSA bacteremia secondary to right axilla abscess which likely represented an infected hematoma  Plan:  Dry dressing, ABD  May leave open to air when no drainage noted  No further surgical intervention at this time  Second set of blood cultures have been obtained,  PICC line placement once blood cultures are negative and the patient would require 2-4 weeks of IV antibiotics after hospital discharge    Subjective/Objective   Chief Complaint:  Patient underwent incision and drainage of abscess in her right axilla 4 days ago  Subjective:  ABD dressing in the right axilla still has a lot of drainage from the area  This was removed  Area is much less swollen  She offers no new complaints at this time  The patient remains on IV cefazolin 2 g Q 8 hours IV  Final wound culture and Gram stain was reported on Saturday which showed 3+ staph aureus      Scheduled Meds:  Current Facility-Administered Medications:  acetaminophen 650 mg Oral Q6H PRN Arpit Vickers PA-C    amLODIPine 5 mg Oral Daily Samson Valera DO    ascorbic acid 500 mg Oral Daily Finn Alvarez MD    atorvastatin 20 mg Oral Daily With Dinner Arpit Vickers PA-C    cefazolin 2,000 mg Intravenous Q8H Finn Alvarez MD Last Rate: 2,000 mg (07/16/18 3912)   doxazosin 4 mg Oral HS Arpit Vickers PA-C    ferrous sulfate 300 mg Oral Daily Finn Alvarez MD    hydrocortisone  Rectal BID Arpit Vickers PA-C    menthol-zinc oxide  Topical BID Sophia Duran MD    metoprolol tartrate 50 mg Oral Q12H [facility] Finn Alvarez MD    ondansetron 4 mg Intravenous Q6H PRN Arpit Vickers PA-C    oxybutynin 5 mg Oral Daily Arpit Vickers PA-C    pantoprazole 40 mg Oral BID AC Arpit Vickers PA-C    senna-docusate sodium 1 tablet Oral BID Finn Alvarez MD valsartan 160 mg Oral BID Apollo Metzger MD      Continuous Infusions:   PRN Meds:   acetaminophen    ondansetron      Objective:     Blood pressure 162/72, pulse 74, temperature 98 2 °F (36 8 °C), temperature source Temporal, resp  rate 18, height 4' 8" (1 422 m), weight 58 3 kg (128 lb 8 5 oz), SpO2 96 %  ,Body mass index is 28 82 kg/m²  Intake/Output Summary (Last 24 hours) at 07/16/18 0859  Last data filed at 07/16/18 0338   Gross per 24 hour   Intake              720 ml   Output             1150 ml   Net             -430 ml       Invasive Devices     Peripheral Intravenous Line            Peripheral IV 07/15/18 Left Forearm 1 day          Drain            Urethral Catheter Double-lumen 16 Fr  4 days                Physical Exam:  Patient is awake alert  She is no acute distress  Skin warm and dry to touch  Inspection of the right axilla finds that the ABD dressing is soaked with a yellow serous sanguinous non smelling drainage  The right axilla incision is not erythematous  The drainage site is still open  The skin has receded and there is significant improvement of the swelling in the area  The area is nontender  Lab, Imaging and other studies:    Wound culture and Gram stain   Order: [id number]   Status:  Final result   Visible to patient:  No (Inaccessible in [address])   Next appt:  08/14/2018 at 01:30 PM in Gastroenterology Neymar Bartlett PA-C)   Dx: Mass of right axilla   Specimen Information: Wound;  Wound        Wound Culture 3+ Growth of Staphylococcus aureus               GRAM STAIN RESULT  No polys seen      No bacteria seen            Susceptibility      Staphylococcus aureus     ROLANDA     Ampicillin ($$) <=2 00 ug/ml"><=2 00 ug/ml Resistant     Cefazolin ($) <=4 00 ug/ml"><=4 00 ug/ml Susceptible     Clindamycin ($) <=0 25 ug/ml"><=0 25 ug/ml Susceptible     Erythromycin ($$$$) 0 50 ug/ml Susceptible     Gentamicin ($$) <=1 ug/ml"><=1 ug/ml Susceptible     Oxacillin <=0 25 ug/ml"><=0 25 ug/ml Susceptible     Tetracycline <=2 ug/ml"><=2 ug/ml Susceptible     Trimethoprim + Sulfamethoxazole ($$$) <=0 5/9 5 ug/ml"><=0 5/9 5 u  Susceptible     Vancomycin ($) 2 00 ug/ml Susceptible              Specimen Collected: 07/12/18 13:19   Last Resulted: 07/14/18 09:39                VTE Pharmacologic Prophylaxis: Reason for no pharmacologic prophylaxis Contraindicated because of acute blood loss anemia  VTE Mechanical Prophylaxis: sequential compression device    Time spent by this provider was 20 minutes including wound dressing change and examination of the patient      Betty Zepeda PA-C

## 2018-07-16 NOTE — ASSESSMENT & PLAN NOTE
-Postop day #4   Status post incision and drainage abscess right axilla  -Continue IV Ancef   -wound care per general surgery   -follow up with general surgery in office as outpatient

## 2018-07-16 NOTE — CASE MANAGEMENT
Continued Stay Review    Date: 07/16/2018    Vital Signs: /65 (BP Location: Left arm)   Pulse 78   Temp 98 9 °F (37 2 °C) (Temporal)   Resp 18   Ht 4' 8" (1 422 m) Comment: There appears to be some discrepancy in accurate ht  Per chart review:  4'2"  However, pt states 4'9" and pt's family reports 4'8"  Wt 58 3 kg (128 lb 8 5 oz)   SpO2 97%   BMI 28 82 kg/m²     Medications:   Scheduled Meds:   Current Facility-Administered Medications:  acetaminophen 650 mg Oral Q6H PRN Arpit Vickers PA-C    amLODIPine 5 mg Oral Daily Samson Valera DO    ascorbic acid 500 mg Oral Daily Tone Mcpherson MD    atorvastatin 20 mg Oral Daily With Dinner Arpit Vickers PA-C    cefazolin 2,000 mg Intravenous Q8H Tone Mcpherson MD Last Rate: 2,000 mg (07/16/18 1550)   doxazosin 4 mg Oral HS Arpit Vickers PA-C    ferrous sulfate 300 mg Oral Daily Tone Mcpherson MD    hydrocortisone  Rectal BID Arpit Vickers PA-C    menthol-zinc oxide  Topical BID Sophia Duran MD    metoprolol tartrate 50 mg Oral Q12H [facility] Tone Mcpherson MD    ondansetron 4 mg Intravenous Q6H PRN Arpit Vickers PA-C    oxybutynin 5 mg Oral Daily Arpit Vickers PA-C    pantoprazole 40 mg Oral BID AC Arpit Vickers PA-C    senna-docusate sodium 1 tablet Oral BID Tone Mcpherson MD    valsartan 160 mg Oral BID Tone Mcpherson MD      Continuous Infusions:    PRN Meds:   acetaminophen    ondansetron      Age/Sex: 82 y o  female     Assessment/Plan:   * Bacteremia due to methicillin susceptible Staphylococcus aureus (MSSA)   Assessment & Plan     -2/2 blood cultures positive for Staphylococcus aureus     -patient initially started on IV Vancomycin which was switched to IV Ancef on 7/12/18 based on blood cultures   -Per ID patient will need 2-4 weeks of therapy based on clinical course   -awaiting blood culture results from 7/14/18 before placing PICC Line   -patient will need outpatient follow up with Dr Mona Alicea (ID)          Abscess of axilla, right Assessment & Plan     -Postop day #4   Status post incision and drainage abscess right axilla  -Continue IV Ancef   -wound care per general surgery   -follow up with general surgery in office as outpatient           Acute kidney injury ([facility] [address] )   Assessment & Plan     -Likely due to pre renal azotemia  -Resolved with IV fluids   -Continue to monitor           Acute blood loss anemia   Assessment & Plan     -stable, continue to monitor           Benign essential hypertension   Assessment & Plan     -blood pressure elevated   -Lopressor and divan restarted on 7/15/18   -Norvasc restarted today   -continue to monitor              Hyponatremia   Assessment & Plan     Resolved          Generalized weakness   Assessment & Plan     -Reports generalized weakness X 2 days prior to admission   -short-term rehab when discharged           Rectal prolapse   Assessment & Plan     -likely source of occult chronic GI bleed  -outpatient GI follow up           Pressure ulcer of sacral region, stage 2   Assessment & Plan     -wound care per wound care nurse orders    -reposition patient frequently   -accumax air mattress with air pump   -soft care cushion when OOB to chair           Hyperlipidemia   Assessment & Plan     -Continue statins             Current Patient Status: Inpatient   Certification Statement: The patient will continue to require additional inpatient hospital stay due to continued need for IV antibiotics         Discharge Plan: Plan for discharge to [facility] for prolonged antibiotic treatment and STR once repeat blood culture is negative and PICC line can be placed

## 2018-07-16 NOTE — PHYSICAL THERAPY NOTE
PHYSICAL THERAPY NOTE          Patient Name: David ARREDONDOU Date: 7/16/2018 07/16/18 1521   Pain Assessment   Pain Assessment No/denies pain   Pain Score No Pain   Restrictions/Precautions   Weight Bearing Precautions Per Order No   Other Precautions Fall Risk   Cognition   Overall Cognitive Status Impaired   Arousal/Participation Alert   Attention Within functional limits   Following Commands Follows one step commands without difficulty   Subjective   Subjective Pt agreeable to therapy  Bed Mobility   Rolling L 4  Minimal assistance   Additional items Assist x 1; Increased time required   Supine to Sit 4  Minimal assistance   Additional items Assist x 1;HOB elevated; Bedrails   Sit to Supine 4  Minimal assistance   Additional items Assist x 1;HOB elevated; Bedrails   Balance   Static Sitting Fair   Dynamic Sitting Fair   Endurance Deficit   Endurance Deficit Yes   Activity Tolerance   Activity Tolerance Patient limited by fatigue   Exercises   Knee AROM Sitting;20 reps;Bilateral   Ankle Pumps Sitting;20 reps;Bilateral   Assessment   Prognosis Good   Problem List Decreased strength;Decreased endurance; Impaired balance;Decreased mobility; Impaired judgement;Decreased safety awareness   Assessment Pt agreeable to sitting EOB to participate in therapy  Pt able to perform all bed mobility with Luc x 1 and increased time  Pt with decreased functional activity tolerance once at EOB to perform AROM activities  Pt would benefit from continued skilled PT intervention to address limitations and increase LOF  Plan   Treatment/Interventions Functional transfer training;LE strengthening/ROM; Therapeutic exercise; Bed mobility;Gait training   Progress Slow progress, decreased activity tolerance   Recommendation   Recommendation Short-term skilled PT   PT - OK to Discharge Yes  (when medically stable)     Pt with SCD's on when PT entered room  SCD's reapplied and turned on with pt supine in bed with call bell in reach

## 2018-07-16 NOTE — SOCIAL WORK
CM f/u with pt re: SNF/STR on dc   Pt is agreeable and requested referrals to the 5th floor and [place] [facility]  Referrals made, Cm will f/u

## 2018-07-16 NOTE — ASSESSMENT & PLAN NOTE
-wound care per wound care nurse orders  -reposition patient frequently   -accumax air mattress with air pump   -soft care cushion when OOB to chair

## 2018-07-16 NOTE — ASSESSMENT & PLAN NOTE
-2/2 blood cultures positive for Staphylococcus aureus     -patient initially started on IV Vancomycin which was switched to IV Ancef on 7/12/18 based on blood cultures   -Per ID patient will need 2-4 weeks of therapy based on clinical course   -awaiting blood culture results from 7/14/18 before placing PICC Line   -patient will need outpatient follow up with Dr Kathia Baer (ID)

## 2018-07-16 NOTE — PROGRESS NOTES
Progress Note - David Odor 1935, 82 y o  female MRN: [id number]    Unit/Bed#: 485-73 Encounter: [id number]    Primary Care Provider: Nando Vergara DO   Date and time admitted to hospital: 7/8/2018 12:38 PM        * Bacteremia due to methicillin susceptible Staphylococcus aureus (MSSA)   Assessment & Plan    -2/2 blood cultures positive for Staphylococcus aureus  -patient initially started on IV Vancomycin which was switched to IV Ancef on 7/12/18 based on blood cultures   -Per ID patient will need 2-4 weeks of therapy based on clinical course   -awaiting blood culture results from 7/14/18 before placing PICC Line   -patient will need outpatient follow up with Dr Miah Comer (ID)        Abscess of axilla, right   Assessment & Plan    -Postop day #4   Status post incision and drainage abscess right axilla  -Continue IV Ancef   -wound care per general surgery   -follow up with general surgery in office as outpatient  Acute kidney injury [facility])   Assessment & Plan    -Likely due to pre renal azotemia  -Resolved with IV fluids   -Continue to monitor  Acute blood loss anemia   Assessment & Plan    -stable, continue to monitor  Benign essential hypertension   Assessment & Plan    -blood pressure elevated   -Lopressor and divan restarted on 7/15/18   -Norvasc restarted today   -continue to monitor  Hyponatremia   Assessment & Plan    Resolved        Generalized weakness   Assessment & Plan    -Reports generalized weakness X 2 days prior to admission   -short-term rehab when discharged  Rectal prolapse   Assessment & Plan    -likely source of occult chronic GI bleed  -outpatient GI follow up  Pressure ulcer of sacral region, stage 2   Assessment & Plan    -wound care per wound care nurse orders  -reposition patient frequently   -accumax air mattress with air pump   -soft care cushion when OOB to chair          Hyperlipidemia   Assessment & Plan    -Continue statins VTE Pharmacologic Prophylaxis:   Pharmacologic: Pharmacologic VTE Prophylaxis contraindicated due to anemia with recent GI bleed  Mechanical VTE Prophylaxis in Place: Yes    Patient Centered Rounds: I have performed bedside rounds with nursing staff today  Discussions with Specialists or Other Care Team Provider: nursing, CM, and attending    Education and Discussions with Family / Patient: n/a    Time Spent for Care: 30 minutes  More than 50% of total time spent on counseling and coordination of care as described above  Current Length of Stay: 8 day(s)    Current Patient Status: Inpatient   Certification Statement: The patient will continue to require additional inpatient hospital stay due to continued need for IV antibiotics  Discharge Plan: Plan for discharge to [facility] for prolonged antibiotic treatment and STR once repeat blood culture is negative and PICC line can be placed  Code Status: Level 1 - Full Code      Subjective: The patient was seen and examined  The patient states she is feeling well and denies any complaints  Objective:     Vitals:   Temp (24hrs), Av 3 °F (36 8 °C), Min:98 2 °F (36 8 °C), Max:98 4 °F (36 9 °C)    HR:  [74-84] 74  Resp:  [18] 18  BP: (124-186)/(62-76) 162/72  SpO2:  [95 %-97 %] 96 %  Body mass index is 28 82 kg/m²  Input and Output Summary (last 24 hours): Intake/Output Summary (Last 24 hours) at 18 1428  Last data filed at 18 1310   Gross per 24 hour   Intake              720 ml   Output              975 ml   Net             -255 ml       Physical Exam:     Physical Exam   Constitutional: She is oriented to person, place, and time  Vital signs are normal  She appears well-developed and well-nourished  She is active and cooperative  Cardiovascular: Normal rate and regular rhythm  Pulmonary/Chest: Effort normal and breath sounds normal  She has no wheezes  She has no rhonchi  She has no rales  Abdominal: Soft   Normal appearance and bowel sounds are normal  There is no tenderness  Neurological: She is alert and oriented to person, place, and time  No cranial nerve deficit  Skin:   Right axilla with dressing clean, dry, and intact  Nursing note and vitals reviewed  Additional Data:     Labs:      Results from last 7 days  Lab Units 07/14/18  1848 07/13/18  0528   WBC Thousand/uL 6 16 5 56   HEMOGLOBIN g/dL 8 6* 7 4*   HEMATOCRIT % 26 9* 23 9*   PLATELETS Thousands/uL 154 143*   NEUTROS PCT %  --  75   LYMPHS PCT %  --  15   MONOS PCT %  --  9   EOS PCT %  --  2       Results from last 7 days  Lab Units 07/14/18  1849  07/11/18  0512   SODIUM mmol/L 134*  < > 134*   POTASSIUM mmol/L 3 6  < > 3 6   CHLORIDE mmol/L 104  < > 106   CO2 mmol/L 21  < > 20*   BUN mg/dL 12  < > 26*   CREATININE mg/dL 1 07  < > 1 25   CALCIUM mg/dL 8 6  < > 7 8*   TOTAL PROTEIN g/dL  --   --  5 1*   BILIRUBIN TOTAL mg/dL  --   --  0 40   ALK PHOS U/L  --   --  70   ALT U/L  --   --  14   AST U/L  --   --  14   GLUCOSE RANDOM mg/dL 141*  < > 115   < > = values in this interval not displayed  * I Have Reviewed All Lab Data Listed Above  * Additional Pertinent Lab Tests Reviewed: All Labs Within Last 24 Hours Reviewed    Imaging:    Imaging Reports Reviewed Today Include: none  Imaging Personally Reviewed by Myself Includes:  none    Recent Cultures (last 7 days):       Results from last 7 days  Lab Units 07/12/18  1319 07/11/18  1228   BLOOD CULTURE   --  No Growth After 4 Days     GRAM STAIN RESULT  No polys seen  No bacteria seen  --    WOUND CULTURE  3+ Growth of Staphylococcus aureus*  --        Last 24 Hours Medication List:     Current Facility-Administered Medications:  acetaminophen 650 mg Oral Q6H PRN Arpit Vickers PA-C    amLODIPine 5 mg Oral Daily Samson Valera DO    ascorbic acid 500 mg Oral Daily Jamal Brush MD    atorvastatin 20 mg Oral Daily With Dinner Arpit Vickers PA-C    cefazolin 2,000 mg Intravenous Q8H Dontrell Patel MD Last Rate: 2,000 mg (07/16/18 3314)   doxazosin 4 mg Oral HS Arpit Vickers PA-C    ferrous sulfate 300 mg Oral Daily Dontrell Patel MD    hydrocortisone  Rectal BID Arpit Vickers PA-C    menthol-zinc oxide  Topical BID Sophia Duran MD    metoprolol tartrate 50 mg Oral Q12H [facility] HOME Dontrell Patel MD    ondansetron 4 mg Intravenous Q6H PRN Aript Vickers PA-C    oxybutynin 5 mg Oral Daily Arpit Vickers PA-C    pantoprazole 40 mg Oral BID AC Arpit Vickers PA-C    senna-docusate sodium 1 tablet Oral BID Dontrell Patel MD    valsartan 160 mg Oral BID Dontrell Patel MD         Today, Patient Was Seen By: Demetrice Acosta PA-C    ** Please Note: Dictation voice to text software may have been used in the creation of this document   **

## 2018-07-16 NOTE — PLAN OF CARE
Problem: DISCHARGE PLANNING - CARE MANAGEMENT  Goal: Discharge to post-acute care or home with appropriate resources  INTERVENTIONS:  - Conduct assessment to determine patient/family and health care team treatment goals, and need for post-acute services based on payer coverage, community resources, and patient preferences, and barriers to discharge  - Address psychosocial, clinical, and financial barriers to discharge as identified in assessment in conjunction with the patient/family and health care team  - Arrange appropriate level of post-acute services according to patient's   needs and preference and payer coverage in collaboration with the physician and health care team  - Communicate with and update the patient/family, physician, and health care team regarding progress on the discharge plan  - Arrange appropriate transportation to post-acute venues   Outcome: Progressing  -SNF/STR  -referrals 5th floor and [place] [facility]

## 2018-07-17 NOTE — ASSESSMENT & PLAN NOTE
-blood pressure elevated   -Lopressor and divan restarted on 7/15/18   -Norvasc restarted on 7/16/18   -continue to monitor

## 2018-07-17 NOTE — SOCIAL WORK
Called IR department at [facility] re: pt needing a PICC line  Message left, awaiting a call back

## 2018-07-17 NOTE — PROGRESS NOTES
Progress Note - Nephrology   Anuj Fatima 82 y o  female MRN: [id number]  Unit/Bed#: 417-01 Encounter: [id number]    A/P:  1  Acute kidney injury due to volume depletion in the setting of an abscess-resolved  2  Hyponatremia:    Sodium Level appropriate this morning  3   Hypokalemia   Potassium okay this morning  4  Metabolic alkalosis:  Resolved  5  Hypertension   Continue with amlodipine 5 mg daily in addition to the metoprolol and valsartan  Blood pressures had improved over last 24 hr, however this morning, patient's blood pressure was low but elevated  Would simply recheck later today after morning medications have been given  6   Urinary retention   Patient continues to have Ochoa catheter in place, once again consider Urology evaluation if this can be a potential long-term issue otherwise would recommend continuous observation with postvoid residuals and straight cath as indicated  7   Partial small bowel obstruction: improving and on clear liquids  8   Iron deficiency Anemia: being followed with serial studies    Patient may have Venofer infusions once she has completed her course of antibiotics later this month  9   Methicillin sensitive Staphylococcus aureus bacteremia: on Cefazolin   Patient is on high-dose Cefazolin treatment, continue according to Infectious Disease recommendations  Patient for PICC line placement in the near future once blood cultures come back negative      Follow up reason for today's visit: Acute kidney injury    Bacteremia due to methicillin susceptible Staphylococcus aureus (MSSA)    Patient Active Problem List   Diagnosis    Dislocation of shoulder region, right, initial encounter    Acute respiratory failure with hypoxia and hypercapnia (HCC)    Benign essential hypertension    GI bleed    Hyperlipidemia    Atrial fibrillation (HCC)    Anemia    Acute blood loss anemia    Vaginal prolapse    Elevated troponin    Hematochezia    Urinary retention    Rectal prolapse    Generalized weakness    Acute kidney injury ([facility] [address] )    Hyponatremia    Partial small bowel obstruction (HCC)    Hyperglycemia    Leukocytosis    Bacteremia due to methicillin susceptible Staphylococcus aureus (MSSA)    Pressure ulcer of sacral region, stage 2    Abscess of axilla, right    Essential hypertension         Subjective:   No acute events overnight  Objective:     Vitals: Blood pressure 152/68, pulse 73, temperature 97 9 °F (36 6 °C), temperature source Temporal, resp  rate 20, height 4' 8" (1 422 m), weight 59 6 kg (131 lb 6 3 oz), SpO2 95 %  ,Body mass index is 29 46 kg/m²  Weight (last 2 days)     Date/Time   Weight    07/17/18 0555  59 6 (131 39)    07/16/18 0600  58 3 (128 53)    07/15/18 0555  56 1 (123 68)                Intake/Output Summary (Last 24 hours) at 07/17/18 0810  Last data filed at 07/17/18 0404   Gross per 24 hour   Intake              720 ml   Output             1450 ml   Net             -730 ml     I/O last 3 completed shifts: In: 720 [P O :720]  Out: 2225 [Urine:2225]         Physical Exam: /68 (BP Location: Left arm)   Pulse 73   Temp 97 9 °F (36 6 °C) (Temporal)   Resp 20   Ht 4' 8" (1 422 m) Comment: There appears to be some discrepancy in accurate ht  Per chart review:  4'2"    However, pt states 4'9" and pt's family reports 4'8"  Wt 59 6 kg (131 lb 6 3 oz)   SpO2 95%   BMI 29 46 kg/m²     General Appearance:    Alert, cooperative, no distress, appears stated age   Head:    Normocephalic, without obvious abnormality, atraumatic   Eyes:    Conjunctiva/corneas clear   Ears:    Normal external ears   Nose:   Nares normal, septum midline, mucosa normal, no drainage    or sinus tenderness   Throat:   Lips, mucosa, and tongue normal; teeth and gums normal   Neck:   Supple, symmetrical, trachea midline, no adenopathy;        thyroid:  No enlargement/tenderness/nodules; no carotid    bruit or JVD   Back:     Symmetric, no curvature, ROM normal, no CVA tenderness   Lungs:     Dec to auscultation bilaterally, respirations unlabored   Chest wall:    No tenderness or deformity   Heart:    Regular rate and rhythm, S1 and S2 normal, no murmur, rub   or gallop   Abdomen:     Soft, non-tender, bowel sounds present   Extremities:   Extremities normal, atraumatic, no cyanosis or edema   Skin:   Skin color, texture, turgor normal, no rashes or lesions   Lymph nodes:   Cervical normal   Neurologic:   CNII-XII intact            Lab, Imaging and other studies: I have personally reviewed pertinent labs  CBC:   Lab Results   Component Value Date    WBC 5 55 07/17/2018    HGB 8 2 (L) 07/17/2018    HCT 25 9 (L) 07/17/2018    MCV 94 07/17/2018     07/17/2018    MCH 29 6 07/17/2018    MCHC 31 7 07/17/2018    RDW 15 7 (H) 07/17/2018    MPV 9 9 07/17/2018     CMP:   Lab Results   Component Value Date     07/17/2018    K 3 8 07/17/2018     07/17/2018    CO2 27 07/17/2018    ANIONGAP 6 07/17/2018    BUN 14 07/17/2018    CREATININE 0 99 07/17/2018    GLUCOSE 112 07/17/2018    CALCIUM 8 3 07/17/2018    EGFR 54 07/17/2018             Results from last 7 days  Lab Units 07/17/18  0426 07/14/18  1849 07/13/18  0528 07/11/18  0512   SODIUM mmol/L 139 134* 139 134*   POTASSIUM mmol/L 3 8 3 6 3 3* 3 6   CHLORIDE mmol/L 106 104 109* 106   CO2 mmol/L 27 21 21 20*   BUN mg/dL 14 12 16 26*   CREATININE mg/dL 0 99 1 07 1 12 1 25   CALCIUM mg/dL 8 3 8 6 8 0* 7 8*   TOTAL PROTEIN g/dL  --   --   --  5 1*   BILIRUBIN TOTAL mg/dL  --   --   --  0 40   ALK PHOS U/L  --   --   --  70   ALT U/L  --   --   --  14   AST U/L  --   --   --  14   GLUCOSE RANDOM mg/dL 112 141* 99 115         Phosphorus: No results found for: PHOS  Magnesium:   No results found for: MG  Urinalysis: No results found for: COLORU, CLARITYU, SPECGRAV, PHUR, LEUKOCYTESUR, NITRITE, PROTEINUA, GLUCOSEU, KETONESU, BILIRUBINUR, BLOODU  Ionized Calcium: No results found for: SHIVA  Coagulation: No results found for: PT, INR, APTT  Troponin: No results found for: TROPONINI  ABG: No results found for: PHART, OEU8CDW, PO2ART, MXM4HWU, C3UWOUTR, BEART, SOURCE  Radiology review:     IMAGING  Procedure: Ct Chest Abdomen Pelvis Wo Contrast    Result Date: 7/8/2018  Narrative: CT CHEST, ABDOMEN AND PELVIS WITHOUT IV CONTRAST INDICATION:   Shortness of breath, abdominal pain, hypotensive, renal failure  COMPARISON: 4/12/2018  TECHNIQUE: CT examination of the chest, abdomen and pelvis was performed without intravenous contrast   Axial, sagittal, and coronal 2D reformatted images were created from the source data and submitted for interpretation  Radiation dose length product (DLP) for this visit:  281 88 mGy-cm   This examination, like all CT scans performed in the [facility], was performed utilizing techniques to minimize radiation dose exposure, including the use of iterative  reconstruction and automated exposure control  Enteric contrast was administered  FINDINGS: CHEST LUNGS:  Lungs are clear  There is no tracheal or endobronchial lesion  PLEURA:  Unremarkable  HEART/GREAT VESSELS:  Normal heart size  Coronary artery calcifications noted  Normal caliber thoracic aorta with mild atherosclerotic calcifications  MEDIASTINUM AND BABAR:  Unremarkable  CHEST WALL AND LOWER NECK:   Multiple chronic right rib fractures again identified  There is a fairly well-circumscribed 5 4 x 4 9 cm collection of simple fluid in the right axillary region, which may represent a seroma or resolving hematoma  ABDOMEN Absence of intravenous contrast enhancement limits evaluation of the solid abdominal viscera  LIVER/BILIARY TREE:  Unremarkable  GALLBLADDER:  No calcified gallstones  No pericholecystic inflammatory change  SPLEEN:  Unremarkable  PANCREAS:  Unremarkable  ADRENAL GLANDS:  Unremarkable  KIDNEYS/URETERS:  Unremarkable  No hydronephrosis   STOMACH AND BOWEL:  Several mildly dilated small bowel loops in the lower abdomen to the level of a herniated loop of bowel in a small right paramedian ventral hernia in the lower abdomen, concerning for early or partial small bowel obstruction  APPENDIX:  No findings to suggest appendicitis  ABDOMINOPELVIC CAVITY:  No ascites or free intraperitoneal air  No lymphadenopathy  VESSELS:  Unremarkable for patient's age  PELVIS REPRODUCTIVE ORGANS:  Unremarkable for patient's age  URINARY BLADDER:  Unremarkable  ABDOMINAL WALL/INGUINAL REGIONS:  Small right paramedian ventral hernia in the lower abdomen containing a loop of small bowel, as above  This is new since the prior exam  OSSEOUS STRUCTURES:  No acute fracture or destructive osseous lesion  Chronic fractures of the right ribs, bilateral sacral ala, and left pubic rami appear unchanged  Chronic right shoulder dislocation appears unchanged  Unchanged vertebral wedge deformity of L2  Impression: Findings concerning for early or partial small bowel obstruction related to a herniated small bowel loop within a right lower quadrant ventral hernia  5 4 cm fluid collection in the right axilla, new since the prior study  This may represent a seroma or resolving hematoma  Multiple chronic right rib and pelvic fractures  Chronic right shoulder dislocation also again identified  I personally discussed this study with Todd Levy on 7/8/2018 at 3:37 PM  Workstation performed: [id number]     Procedure: Us Kidney And Bladder    Result Date: 7/9/2018  Narrative: RENAL ULTRASOUND INDICATION:   acute renal failure  COMPARISON: None TECHNIQUE:   Ultrasound of the retroperitoneum was performed with a curvilinear transducer utilizing volumetric sweeps and still imaging techniques  FINDINGS: KIDNEYS: Symmetric and normal size  Right kidney:  10 3 x 4 3 cm  Left kidney:  9 5 x 4 9 cm  Right kidney Normal echogenicity and contour  No suspicious masses detected  Multiple simple cortical cysts (largest 1 3 cm)  No hydronephrosis  No shadowing calculi   Small right perirenal fluid collection  Left kidney Normal echogenicity and contour  No suspicious masses detected  No hydronephrosis  No shadowing calculi  No perinephric fluid collections  URETERS: Nonvisualized  BLADDER: Normally distended  No focal thickening or mass lesions  Left ureteral jet detected  Impression: Negative for evidence of obstructive uropathy  Small right perinephric fluid collection, nonspecific (but may be related to infection)  Workstation performed: [id number]     Procedure: Us Extremity Soft Tissue    Result Date: 7/9/2018  Narrative: UPPER EXTREMITY ULTRASOUND INDICATION:  Right axillary lump COMPARISON:  None  FINDINGS: Ultrasound evaluation of the right axilla performed to evaluate lump  A 5 6 x 4 9 x 5 7 cm flex, primarily cystic lesion with irregular borders, internal debris and enhanced through transmission is seen in the right axilla at the site of clinical concern  Impression: Complex right axillary lesion likely a abscess or complicated cyst/hematoma  Correlation with the clinical findings recommended in this regard and, if warranted, short interval follow-up following appropriate clinical therapy suggested   Workstation performed: [id number]       Current Facility-Administered Medications   Medication Dose Route Frequency    acetaminophen (TYLENOL) tablet 650 mg  650 mg Oral Q6H PRN    amLODIPine (NORVASC) tablet 5 mg  5 mg Oral Daily    ascorbic acid (VITAMIN C) tablet 500 mg  500 mg Oral Daily    atorvastatin (LIPITOR) tablet 20 mg  20 mg Oral Daily With Dinner    ceFAZolin (ANCEF) IVPB (premix) 2,000 mg  2,000 mg Intravenous Q8H    doxazosin (CARDURA) tablet 4 mg  4 mg Oral HS    ferrous sulfate oral elixer 300 mg  300 mg Oral Daily    hydrocortisone (ANUSOL-HC, PROCTOSOL HC)) 2 5 % rectal cream   Rectal BID    menthol-zinc oxide (CALMOSEPTINE) 0 44-20 6 % ointment   Topical BID    metoprolol tartrate (LOPRESSOR) tablet 50 mg  50 mg Oral Q12H [facility]    [facility]etron [facility]) injection 4 mg  4 mg Intravenous Q6H PRN    oxybutynin (DITROPAN) tablet 5 mg  5 mg Oral Daily    pantoprazole (PROTONIX) EC tablet 40 mg  40 mg Oral BID AC    senna-docusate sodium (SENOKOT S) 8 6-50 mg per tablet 1 tablet  1 tablet Oral BID    valsartan (DIOVAN) tablet 160 mg  160 mg Oral BID     Medications Discontinued During This Encounter   Medication Reason    sodium chloride 0 9 % infusion     losartan potassium-hydrochlorothiazide (HYZAAR 100/12  5) combo dose     hydrocortisone-pramoxine (ANALPRAM-HC) 2 5-1 % rectal cream     vancomycin (VANCOCIN) 750 mg in sodium chloride 0 9 % 250 mL IVPB     sodium chloride 0 9 % infusion     sodium chloride 0 9 % irrigation solution Patient Discharge    bupivacaine (MARCAINE) 0 5 % injection Patient Discharge    ondansetron (ZOFRAN) injection 4 mg Patient Transfer    vancomycin (VANCOCIN) 750 mg in sodium chloride 0 9 % 250 mL IVPB     lactated ringers infusion     valsartan (DIOVAN) tablet 40 mg     valsartan (DIOVAN) tablet 80 mg     metoprolol tartrate (LOPRESSOR) tablet 50 mg     metoprolol tartrate (LOPRESSOR) tablet 100 mg        Emerita Ohara DO

## 2018-07-17 NOTE — ASSESSMENT & PLAN NOTE
-2/2 blood cultures positive for Staphylococcus aureus  -patient initially started on IV Vancomycin which was switched to IV Ancef on 7/12/18 based on blood cultures   -Per ID patient will need 2 weeks of therapy from 7/12 to 7/26   -blood culture results from 7/14/18 show no growth at 24 hours    -IR consulted and plan for PICC Line placement tomorrow 7/18/18   -patient will need outpatient follow up with Dr Beatriz Rivera (ID)

## 2018-07-17 NOTE — PROGRESS NOTES
Progress Note - Tania Hinojosa 1935, 82 y o  female MRN: [id number]    Unit/Bed#: 575-56 Encounter: [id number]    Primary Care Provider: Kenya Lara DO   Date and time admitted to hospital: 7/8/2018 12:38 PM        * Bacteremia due to methicillin susceptible Staphylococcus aureus (MSSA)   Assessment & Plan    -2/2 blood cultures positive for Staphylococcus aureus  -patient initially started on IV Vancomycin which was switched to IV Ancef on 7/12/18 based on blood cultures   -Per ID patient will need 2 weeks of therapy from 7/12 to 7/26   -blood culture results from 7/14/18 show no growth at 24 hours  -IR consulted and plan for PICC Line placement tomorrow 7/18/18   -patient will need outpatient follow up with Dr Michelle Pereyra (ID)        Abscess of axilla, right   Assessment & Plan    -Postop day #5   Status post incision and drainage abscess right axilla  -Continue IV Ancef   -wound care per general surgery   -follow up with general surgery in office as outpatient  Acute kidney injury [facility])   Assessment & Plan    -Likely due to pre renal azotemia  -Resolved with IV fluids   -Continue to monitor  Acute blood loss anemia   Assessment & Plan    -stable, continue to monitor  Benign essential hypertension   Assessment & Plan    -blood pressure elevated   -Lopressor and divan restarted on 7/15/18   -Norvasc restarted on 7/16/18   -continue to monitor  Hyponatremia   Assessment & Plan    Resolved        Generalized weakness   Assessment & Plan    -Reports generalized weakness X 2 days prior to admission   -short-term rehab when discharged  Rectal prolapse   Assessment & Plan    -likely source of occult chronic GI bleed  -outpatient GI follow up  Pressure ulcer of sacral region, stage 2   Assessment & Plan    -wound care per wound care nurse orders  -reposition patient frequently   -accumax air mattress with air pump   -soft care cushion when OOB to chair  Hyperlipidemia   Assessment & Plan    -Continue statins            VTE Pharmacologic Prophylaxis:   Pharmacologic: Pharmacologic VTE Prophylaxis contraindicated due to recent GI bleed  Mechanical VTE Prophylaxis in Place: Yes    Patient Centered Rounds: I have performed bedside rounds with nursing staff today  Discussions with Specialists or Other Care Team Provider: nursing, CM, and attending    Education and Discussions with Family / Patient: n/a    Time Spent for Care: 30 minutes  More than 50% of total time spent on counseling and coordination of care as described above  Current Length of Stay: 9 day(s)    Current Patient Status: Inpatient   Certification Statement: The patient will continue to require additional inpatient hospital stay due to continued IV antibiotics and PICC line placement  Discharge Plan: planned discharged tomorrow 18 after PICC Line placement    Code Status: Level 1 - Full Code      Subjective: The patient was seen and examined  The patient denies any complaints  Objective:     Vitals:   Temp (24hrs), Av 1 °F (36 7 °C), Min:97 6 °F (36 4 °C), Max:98 9 °F (37 2 °C)    HR:  [68-80] 71  Resp:  [18-20] 18  BP: (119-173)/(56-79) 119/58  SpO2:  [95 %-98 %] 97 %  Body mass index is 29 46 kg/m²  Input and Output Summary (last 24 hours): Intake/Output Summary (Last 24 hours) at 18 1415  Last data filed at 18 1323   Gross per 24 hour   Intake              840 ml   Output             1650 ml   Net             -810 ml       Physical Exam:     Physical Exam   Constitutional: She is oriented to person, place, and time  Vital signs are normal  She appears well-developed and well-nourished  She is active and cooperative  Cardiovascular: Normal rate, regular rhythm and normal heart sounds  Pulmonary/Chest: Effort normal and breath sounds normal  She has no wheezes  She has no rhonchi  She has no rales  Abdominal: Soft   Normal appearance and bowel sounds are normal  There is no tenderness  Neurological: She is alert and oriented to person, place, and time  No cranial nerve deficit  Skin: Skin is warm and dry  Right axilla wound  Nursing note and vitals reviewed  Additional Data:     Labs:      Results from last 7 days  Lab Units 07/17/18  0426   WBC Thousand/uL 5 55   HEMOGLOBIN g/dL 8 2*   HEMATOCRIT % 25 9*   PLATELETS Thousands/uL 206   NEUTROS PCT % 71   LYMPHS PCT % 21   MONOS PCT % 7   EOS PCT % 1       Results from last 7 days  Lab Units 07/17/18  0426  07/11/18  0512   SODIUM mmol/L 139  < > 134*   POTASSIUM mmol/L 3 8  < > 3 6   CHLORIDE mmol/L 106  < > 106   CO2 mmol/L 27  < > 20*   BUN mg/dL 14  < > 26*   CREATININE mg/dL 0 99  < > 1 25   CALCIUM mg/dL 8 3  < > 7 8*   TOTAL PROTEIN g/dL  --   --  5 1*   BILIRUBIN TOTAL mg/dL  --   --  0 40   ALK PHOS U/L  --   --  70   ALT U/L  --   --  14   AST U/L  --   --  14   GLUCOSE RANDOM mg/dL 112  < > 115   < > = values in this interval not displayed  * I Have Reviewed All Lab Data Listed Above  * Additional Pertinent Lab Tests Reviewed: All Labs Within Last 24 Hours Reviewed    Imaging:    Imaging Reports Reviewed Today Include: none  Imaging Personally Reviewed by Myself Includes:  none    Recent Cultures (last 7 days):       Results from last 7 days  Lab Units 07/14/18  1851 07/12/18  1319 07/11/18  1228   BLOOD CULTURE  No Growth at 24 hrs  No Growth at 24 hrs   --  No Growth After 5 Days     GRAM STAIN RESULT   --  No polys seen  No bacteria seen  --    WOUND CULTURE   --  3+ Growth of Staphylococcus aureus*  --        Last 24 Hours Medication List:     Current Facility-Administered Medications:  acetaminophen 650 mg Oral Q6H PRN Arpit Vickers PA-C    amLODIPine 5 mg Oral Daily Samson Valera DO    ascorbic acid 500 mg Oral Daily Veronique Mcfarlane MD    atorvastatin 20 mg Oral Daily With Dinner Arpit Vickers PA-C    cefazolin 2,000 mg Intravenous Q8H Maurice Carranza MD Cha Last Rate: 2,000 mg (07/17/18 0949)   doxazosin 4 mg Oral HS Arpit Vickers PA-C    ferrous sulfate 300 mg Oral Daily Michel Wyatt MD    hydrocortisone  Rectal BID Arpit Vickers PA-C    menthol-zinc oxide  Topical BID Sophia Duran MD    metoprolol tartrate 50 mg Oral Q12H [address] Michel Wyatt MD    ondansetron 4 mg Intravenous Q6H PRN Arpit Vickers PA-C    oxybutynin 5 mg Oral Daily Arpit Vickers PA-C    pantoprazole 40 mg Oral BID AC Arpit Vikcers PA-C    senna-docusate sodium 1 tablet Oral BID Michel Wyatt MD    valsartan 160 mg Oral BID Michel Wyatt MD         Today, Patient Was Seen By: Shirlene Jean Baptiste PA-C    ** Please Note: Dictation voice to text software may have been used in the creation of this document   **

## 2018-07-17 NOTE — ASSESSMENT & PLAN NOTE
-Postop day #5   Status post incision and drainage abscess right axilla  -Continue IV Ancef   -wound care per general surgery   -follow up with general surgery in office as outpatient

## 2018-07-17 NOTE — OCCUPATIONAL THERAPY NOTE
OT Note     07/17/18 1026   Restrictions/Precautions   Other Precautions Fall Risk   Therapeutic Exercise - ROM   UE-ROM Yes   ROM- Right Upper Extremities   R Shoulder PROM; Flexion; Extension   R Elbow PROM;Elbow flexion;Elbow extension   R Wrist AROM; Wrist flexion;Wrist extension   R Hand AROM  (forearm pro/supination)   R Weight/Reps/Sets 1 sets ten shldr, elbow, 1 set 10 wrist,forearm no resistance   Therapeutic Excerise-Strength   UE Strength Yes   Right Upper Extremity- Strength   R Shoulder Flexion; Extension;Horizontal ABduction   R Elbow Elbow flexion;Elbow extension   R Wrist Wrist flexion;Wrist extension   R Hand (Forearm pro/supination)   R Weight/Reps/Sets 2 sets 10, no resistance   Activity Tolerance   Activity Tolerance (Tolerates tx session)   Assessment   Assessment Presents seated recliner at bedside  Agreeable to participate  PROM completed RUE to pts tolerance thru shldr and elbow movements, actively completes wrist and forearm  Completes LUE therex as per above without c/o  Remains seated recliner  Call bell and phone in reach     Recommendation   Recommendation (Continue OT services )

## 2018-07-17 NOTE — SOCIAL WORK
IR will be coming up at 12 tomorrow to put pt's PICC line in  Pt will then get her afternoon dose of her ABX through her PICC and then be dc'd to [facility] after that

## 2018-07-18 PROBLEM — D50.9 IRON DEFICIENCY ANEMIA: Status: ACTIVE | Noted: 2018-04-12

## 2018-07-18 NOTE — ASSESSMENT & PLAN NOTE
-patient was noted to have urinary retention and a augustin catheter was placed during her hospitalization   -augustin catheter was removed prior to discharge however the patient failed the voiding trial and the augustin catheter was re-inserted   -the patient was discharged with a augustin catheter in place   -she will need to follow up with urology in 1 week for a voiding trial

## 2018-07-18 NOTE — SOCIAL WORK
Scheduled pt's follow up with ID and put on AVS and message was left for Luz at Dr Júnior Deleon office (Urology) re: pt needing a follow up appointment next week, Luz to call [facility] with an appointment

## 2018-07-18 NOTE — PHYSICAL THERAPY NOTE
PT Treatment     07/18/18 1127   Pain Assessment   Pain Score 8   Pain Location Knee   Pain Orientation Right   Pain Onset (with weightbearing  pt denied pain at rest)   Restrictions/Precautions   Other Precautions Fall Risk;Pain;Bed Alarm   Subjective   Subjective pt aggreeable to attempt ambulation and mobility  Pt denies pain at rest    Bed Mobility   Additional Comments See OT note for bed mobility   Transfers   Sit to Stand 4  Minimal assistance   Additional items Assist x 1;Assist x 2;Verbal cues  (with RW)   Stand to Sit 4  Minimal assistance   Additional items Assist x 2;Verbal cues  (with RW)   Stand pivot 4  Minimal assistance   Additional items Assist x 2;Verbal cues  (with RW)   Additional Comments poor weightbearing tolerance R LE limiting ambulation  Pt only able to ambulate 4 ft requiring min to mod(A) to unweight R LE due to knee pain  Ambulation/Elevation   Gait pattern Decreased R stance; Short stride;Decreased foot clearance   Gait Assistance 3  Moderate assist   Additional items Assist x 1   Assistive Device Rolling walker   Distance 4ft with RW mod(A)x1, min(A)x1 to unweight R LE  pt with R knee buckling and pain with weightbearing limiting ambulation  Pt deferred seated and supine TE and requested to return to supine  Balance   Static Sitting Good   Dynamic Sitting Good   Static Standing Fair  (with RW)   Dynamic Standing Fair  (with RW)   Ambulatory Fair -  (with RW)   Endurance Deficit   Endurance Deficit Yes   Endurance Deficit Description limited ambulation and activity tolerance due to R knee pain with weightbearing   Activity Tolerance   Activity Tolerance Patient limited by pain   Assessment   Prognosis Good   Problem List Decreased strength;Decreased endurance;Decreased mobility; Impaired balance;Pain   Assessment Pt still presents with limited ambulation tolerance due to increased knee pain with weightbearing   Pt at increased risk for falls as a result of altered gait and continues to require assistance with all transfers  Pt returned to supine in bed with call bell in reach at nurse request due to pt getting PICC line at noon  Pt will require short term rehab when medically stable for discharge  Plan   Treatment/Interventions Functional transfer training;Patient/family training;Gait training; Endurance training   Progress Slow progress, decreased activity tolerance   Recommendation   Recommendation Short-term skilled PT   PT - OK to Discharge Yes  (to next level of care when medically stable for discharge)   SCD's reapplied and turned on with pt supine in bed call bell in reach and bed alarm on

## 2018-07-18 NOTE — PROGRESS NOTES
18 fr augustin catheter placed per verbal order by Irma Flower PA-C for urinary retention  Patient tolerated well  Catheter draining clear, yellow urine

## 2018-07-18 NOTE — PROGRESS NOTES
Progress Note - Nephrology   Yajaira Cloud 82 y o  female MRN: [id number]  Unit/Bed#: 417-01 Encounter: [id number]    A/P:  1  Acute kidney injury due to volume depletion in the setting of an abscess-resolved  2  Hyponatremia:  Resolved  3  Hypokalemia   Will give potassium supplementation this morning continue to monitor closely  4  Metabolic alkalosis:  Resolved  5  Hypertension   Patient is having some labile blood pressures, however overall, her blood pressures have been improved and I would continue current medication regimen  6   Urinary retention   Patient continues to have Ochoa catheter in place  If the patient has a chronic Ochoa catheter, continue with this  If not we should consider removing the patient's Ochoa catheter discontinuing oxybutynin and if the patient is unable to achieve proper results, have Urology evaluate for further input  7   Partial small bowel obstruction: improving and on clear liquids  8   Iron deficiency Anemia: being followed with serial studies    Patient may have Venofer infusions once she has completed her course of antibiotics later this month  9   Methicillin sensitive Staphylococcus aureus bacteremia: on Cefazolin   Patient is on high-dose Cefazolin treatment, continue according to Infectious Disease recommendations  Patient for PICC line placement in the near future once blood cultures come back negative      Follow up reason for today's visit: Acute kidney injury    Bacteremia due to methicillin susceptible Staphylococcus aureus (MSSA)    Patient Active Problem List   Diagnosis    Dislocation of shoulder region, right, initial encounter    Acute respiratory failure with hypoxia and hypercapnia (HCC)    Benign essential hypertension    GI bleed    Hyperlipidemia    Atrial fibrillation (HCC)    Anemia    Acute blood loss anemia    Vaginal prolapse    Elevated troponin    Hematochezia    Urinary retention    Rectal prolapse    Generalized weakness    Acute kidney injury ([facility] [address] )    Hyponatremia    Partial small bowel obstruction (HCC)    Hyperglycemia    Leukocytosis    Bacteremia due to methicillin susceptible Staphylococcus aureus (MSSA)    Pressure ulcer of sacral region, stage 2    Abscess of axilla, right    Essential hypertension         Subjective:   No acute events overnight  Objective:     Vitals: Blood pressure 145/59, pulse 78, temperature 99 1 °F (37 3 °C), temperature source Temporal, resp  rate 18, height 4' 8" (1 422 m), weight 59 4 kg (130 lb 15 3 oz), SpO2 97 %  ,Body mass index is 29 36 kg/m²  Weight (last 2 days)     Date/Time   Weight    07/18/18 0600  59 4 (130 95)    07/17/18 0555  59 6 (131 39)    07/16/18 0600  58 3 (128 53)                Intake/Output Summary (Last 24 hours) at 07/18/18 0750  Last data filed at 07/18/18 7130   Gross per 24 hour   Intake              840 ml   Output             1250 ml   Net             -410 ml     I/O last 3 completed shifts: In: 840 [P O :840]  Out: 2050 [Urine:2050]         Physical Exam: /59 (BP Location: Left arm)   Pulse 78   Temp 99 1 °F (37 3 °C) (Temporal)   Resp 18   Ht 4' 8" (1 422 m) Comment: There appears to be some discrepancy in accurate ht  Per chart review:  4'2"    However, pt states 4'9" and pt's family reports 4'8"  Wt 59 4 kg (130 lb 15 3 oz)   SpO2 97%   BMI 29 36 kg/m²     General Appearance:    Alert, cooperative, no distress, appears stated age   Head:    Normocephalic, without obvious abnormality, atraumatic   Eyes:    Conjunctiva/corneas clear   Ears:    Normal external ears   Nose:   Nares normal, septum midline, mucosa normal, no drainage    or sinus tenderness   Throat:   Lips, mucosa, and tongue normal; teeth and gums normal   Neck:   Supple, symmetrical, trachea midline, no adenopathy;        thyroid:  No enlargement/tenderness/nodules; no carotid    bruit or JVD   Back:     Symmetric, no curvature, ROM normal, no CVA tenderness   Lungs:     Dec to auscultation bilaterally, respirations unlabored   Chest wall:    No tenderness or deformity   Heart:    Regular rate and rhythm, S1 and S2 normal, no murmur, rub   or gallop   Abdomen:     Soft, non-tender, bowel sounds present   Extremities:   Extremities normal, atraumatic, no cyanosis or edema   Skin:   Skin color, texture, turgor normal, no rashes or lesions   Lymph nodes:   Cervical normal   Neurologic:   CNII-XII intact            Lab, Imaging and other studies: I have personally reviewed pertinent labs  CBC:   Lab Results   Component Value Date    WBC 6 69 07/18/2018    HGB 7 8 (L) 07/18/2018    HCT 24 5 (L) 07/18/2018    MCV 95 07/18/2018     07/18/2018    MCH 30 1 07/18/2018    MCHC 31 8 07/18/2018    RDW 16 0 (H) 07/18/2018    MPV 10 8 07/18/2018     CMP:   Lab Results   Component Value Date     07/18/2018    K 3 4 (L) 07/18/2018     07/18/2018    CO2 28 07/18/2018    ANIONGAP 5 07/18/2018    BUN 14 07/18/2018    CREATININE 0 99 07/18/2018    GLUCOSE 116 07/18/2018    CALCIUM 7 9 (L) 07/18/2018    EGFR 54 07/18/2018             Results from last 7 days  Lab Units 07/18/18  0435 07/17/18  0426 07/14/18  1849   SODIUM mmol/L 138 139 134*   POTASSIUM mmol/L 3 4* 3 8 3 6   CHLORIDE mmol/L 105 106 104   CO2 mmol/L 28 27 21   BUN mg/dL 14 14 12   CREATININE mg/dL 0 99 0 99 1 07   CALCIUM mg/dL 7 9* 8 3 8 6   GLUCOSE RANDOM mg/dL 116 112 141*         Phosphorus: No results found for: PHOS  Magnesium:   No results found for: MG  Urinalysis: No results found for: COLORU, CLARITYU, SPECGRAV, PHUR, LEUKOCYTESUR, NITRITE, PROTEINUA, GLUCOSEU, KETONESU, BILIRUBINUR, BLOODU  Ionized Calcium: No results found for: CAION  Coagulation: No results found for: PT, INR, APTT  Troponin: No results found for: TROPONINI  ABG: No results found for: PHART, MPM8CVX, PO2ART, NVH1NNR, R2WZHTOA, BEART, SOURCE  Radiology review:     IMAGING  Procedure: Ct Chest Abdomen Pelvis Wo Contrast    Result Date: 7/8/2018  Narrative: CT CHEST, ABDOMEN AND PELVIS WITHOUT IV CONTRAST INDICATION:   Shortness of breath, abdominal pain, hypotensive, renal failure  COMPARISON: 4/12/2018  TECHNIQUE: CT examination of the chest, abdomen and pelvis was performed without intravenous contrast   Axial, sagittal, and coronal 2D reformatted images were created from the source data and submitted for interpretation  Radiation dose length product (DLP) for this visit:  281 88 mGy-cm   This examination, like all CT scans performed in the [facility], was performed utilizing techniques to minimize radiation dose exposure, including the use of iterative  reconstruction and automated exposure control  Enteric contrast was administered  FINDINGS: CHEST LUNGS:  Lungs are clear  There is no tracheal or endobronchial lesion  PLEURA:  Unremarkable  HEART/GREAT VESSELS:  Normal heart size  Coronary artery calcifications noted  Normal caliber thoracic aorta with mild atherosclerotic calcifications  MEDIASTINUM AND ABBAR:  Unremarkable  CHEST WALL AND LOWER NECK:   Multiple chronic right rib fractures again identified  There is a fairly well-circumscribed 5 4 x 4 9 cm collection of simple fluid in the right axillary region, which may represent a seroma or resolving hematoma  ABDOMEN Absence of intravenous contrast enhancement limits evaluation of the solid abdominal viscera  LIVER/BILIARY TREE:  Unremarkable  GALLBLADDER:  No calcified gallstones  No pericholecystic inflammatory change  SPLEEN:  Unremarkable  PANCREAS:  Unremarkable  ADRENAL GLANDS:  Unremarkable  KIDNEYS/URETERS:  Unremarkable  No hydronephrosis  STOMACH AND BOWEL:  Several mildly dilated small bowel loops in the lower abdomen to the level of a herniated loop of bowel in a small right paramedian ventral hernia in the lower abdomen, concerning for early or partial small bowel obstruction  APPENDIX:  No findings to suggest appendicitis   ABDOMINOPELVIC CAVITY:  No ascites or free intraperitoneal air  No lymphadenopathy  VESSELS:  Unremarkable for patient's age  PELVIS REPRODUCTIVE ORGANS:  Unremarkable for patient's age  URINARY BLADDER:  Unremarkable  ABDOMINAL WALL/INGUINAL REGIONS:  Small right paramedian ventral hernia in the lower abdomen containing a loop of small bowel, as above  This is new since the prior exam  OSSEOUS STRUCTURES:  No acute fracture or destructive osseous lesion  Chronic fractures of the right ribs, bilateral sacral ala, and left pubic rami appear unchanged  Chronic right shoulder dislocation appears unchanged  Unchanged vertebral wedge deformity of L2  Impression: Findings concerning for early or partial small bowel obstruction related to a herniated small bowel loop within a right lower quadrant ventral hernia  5 4 cm fluid collection in the right axilla, new since the prior study  This may represent a seroma or resolving hematoma  Multiple chronic right rib and pelvic fractures  Chronic right shoulder dislocation also again identified  I personally discussed this study with Margret Dyson on 7/8/2018 at 3:37 PM  Workstation performed: [id number]     Procedure: Us Kidney And Bladder    Result Date: 7/9/2018  Narrative: RENAL ULTRASOUND INDICATION:   acute renal failure  COMPARISON: None TECHNIQUE:   Ultrasound of the retroperitoneum was performed with a curvilinear transducer utilizing volumetric sweeps and still imaging techniques  FINDINGS: KIDNEYS: Symmetric and normal size  Right kidney:  10 3 x 4 3 cm  Left kidney:  9 5 x 4 9 cm  Right kidney Normal echogenicity and contour  No suspicious masses detected  Multiple simple cortical cysts (largest 1 3 cm)  No hydronephrosis  No shadowing calculi  Small right perirenal fluid collection  Left kidney Normal echogenicity and contour  No suspicious masses detected  No hydronephrosis  No shadowing calculi  No perinephric fluid collections  URETERS: Nonvisualized   BLADDER: Normally distended  No focal thickening or mass lesions  Left ureteral jet detected  Impression: Negative for evidence of obstructive uropathy  Small right perinephric fluid collection, nonspecific (but may be related to infection)  Workstation performed: [id number]     Procedure: Us Extremity Soft Tissue    Result Date: 7/9/2018  Narrative: UPPER EXTREMITY ULTRASOUND INDICATION:  Right axillary lump COMPARISON:  None  FINDINGS: Ultrasound evaluation of the right axilla performed to evaluate lump  A 5 6 x 4 9 x 5 7 cm flex, primarily cystic lesion with irregular borders, internal debris and enhanced through transmission is seen in the right axilla at the site of clinical concern  Impression: Complex right axillary lesion likely a abscess or complicated cyst/hematoma  Correlation with the clinical findings recommended in this regard and, if warranted, short interval follow-up following appropriate clinical therapy suggested   Workstation performed: [id number]       Current Facility-Administered Medications   Medication Dose Route Frequency    acetaminophen (TYLENOL) tablet 650 mg  650 mg Oral Q6H PRN    amLODIPine (NORVASC) tablet 5 mg  5 mg Oral Daily    ascorbic acid (VITAMIN C) tablet 500 mg  500 mg Oral Daily    atorvastatin (LIPITOR) tablet 20 mg  20 mg Oral Daily With Dinner    ceFAZolin (ANCEF) IVPB (premix) 2,000 mg  2,000 mg Intravenous Q8H    doxazosin (CARDURA) tablet 4 mg  4 mg Oral HS    ferrous sulfate oral elixer 300 mg  300 mg Oral Daily    hydrocortisone (ANUSOL-HC, PROCTOSOL HC)) 2 5 % rectal cream   Rectal BID    menthol-zinc oxide (CALMOSEPTINE) 0 44-20 6 % ointment   Topical BID    metoprolol tartrate (LOPRESSOR) tablet 50 mg  50 mg Oral Q12H [address]    ondansetron (ZOFRAN) injection 4 mg  4 mg Intravenous Q6H PRN    oxybutynin (DITROPAN) tablet 5 mg  5 mg Oral Daily    pantoprazole (PROTONIX) EC tablet 40 mg  40 mg Oral BID AC    senna-docusate sodium (SENOKOT S) 8 6-50 mg per tablet 1 tablet  1 tablet Oral BID    valsartan (DIOVAN) tablet 160 mg  160 mg Oral BID     Medications Discontinued During This Encounter   Medication Reason    sodium chloride 0 9 % infusion     losartan potassium-hydrochlorothiazide (HYZAAR 100/12  5) combo dose     hydrocortisone-pramoxine (ANALPRAM-HC) 2 5-1 % rectal cream     vancomycin (VANCOCIN) 750 mg in sodium chloride 0 9 % 250 mL IVPB     sodium chloride 0 9 % infusion     sodium chloride 0 9 % irrigation solution Patient Discharge    bupivacaine (MARCAINE) 0 5 % injection Patient Discharge    ondansetron (ZOFRAN) injection 4 mg Patient Transfer    vancomycin (VANCOCIN) 750 mg in sodium chloride 0 9 % 250 mL IVPB     lactated ringers infusion     valsartan (DIOVAN) tablet 40 mg     valsartan (DIOVAN) tablet 80 mg     metoprolol tartrate (LOPRESSOR) tablet 50 mg     metoprolol tartrate (LOPRESSOR) tablet 100 mg     ceFAZolin (ANCEF) IVPB (premix) 2,000 mg        Nayan Ruiz DO

## 2018-07-18 NOTE — ASSESSMENT & PLAN NOTE
-stable, continue to monitor   -continue ferrous sulfate supplementation  -patient to follow up with Nephrology as an outpatient for IV Venofer treatment once IV antibiotics are complete

## 2018-07-18 NOTE — PLAN OF CARE
Problem: DISCHARGE PLANNING - CARE MANAGEMENT  Goal: Discharge to post-acute care or home with appropriate resources  INTERVENTIONS:  - Conduct assessment to determine patient/family and health care team treatment goals, and need for post-acute services based on payer coverage, community resources, and patient preferences, and barriers to discharge  - Address psychosocial, clinical, and financial barriers to discharge as identified in assessment in conjunction with the patient/family and health care team  - Arrange appropriate level of post-acute services according to patient's   needs and preference and payer coverage in collaboration with the physician and health care team  - Communicate with and update the patient/family, physician, and health care team regarding progress on the discharge plan  - Arrange appropriate transportation to post-acute venues   Outcome: Completed  -SNF/STR at [facility]  -outpatient urology follow up

## 2018-07-18 NOTE — SOCIAL WORK
[facility] ambulance ([facility]) will be picking pt up between 5-6pm to transport pt  Cost will be $125  Pt's son aware and agreeable  Leslie Ferris in admissions dept at [facility] aware of  time

## 2018-07-18 NOTE — ASSESSMENT & PLAN NOTE
-reposition patient frequently   -accumax air mattress with air pump   -soft care cushion when OOB to chair   -follow up with wound care clinic

## 2018-07-18 NOTE — ASSESSMENT & PLAN NOTE
-partial small bowel obstruction seen on CT scan  -[facility] consulted and recommended conservative management   -patient was initially made NPO  As her symptoms improved she was started on a clear liquid diet which was advanced as tolerated    -by the day of discharge the patient's symptoms had completely resolved and was tolerating a regular diet

## 2018-07-18 NOTE — ASSESSMENT & PLAN NOTE
-Reports generalized weakness X 2 days prior to admission   -the patient is being discharged to [facility]

## 2018-07-18 NOTE — DISCHARGE SUMMARY
Discharge- Gustabo Rodríguez 1935, 82 y o  female MRN: [id number]    Unit/Bed#: 407-92 Encounter: [id number]    Primary Care Provider: Paula Corbin DO   Date and time admitted to hospital: 7/8/2018 12:38 PM        * Bacteremia due to methicillin susceptible Staphylococcus aureus (MSSA)   Assessment & Plan    -2/2 blood cultures positive for Staphylococcus aureus  -patient initially started on IV Vancomycin which was switched to IV Ancef on 7/12/18 based on blood cultures   -Per ID patient will need 2 weeks of therapy from 7/12 to 7/26   -blood culture results from 7/14/18 show no growth at 24 hours  -IR consulted and placed PICC Line 7/18/18    -the patient received 1 dose of IV antibiotics through PICC line before discharge   -weekly CBC and CMP to be faxed to ID office attn:Dr Taina Sage at [phone]   -patient will need 1 week outpatient follow up with Dr Taina Sage (ID)        Abscess of axilla, right   Assessment & Plan    -Postop day #5   Status post incision and drainage abscess right axilla  -Continue IV Ancef   -wound care per general surgery   -follow up with general surgery in office as outpatient  Acute kidney injury [facility])   Assessment & Plan    -Likely due to pre renal azotemia  -Resolved with IV fluids  Iron deficiency anemia   Assessment & Plan    -stable, continue to monitor   -continue ferrous sulfate supplementation  -patient to follow up with Nephrology as an outpatient for IV Venofer treatment once IV antibiotics are complete  Benign essential hypertension   Assessment & Plan    -blood pressure improved  -Lopressor and divan restarted on 7/15/18   -Norvasc restarted on 7/16/18  Hyponatremia   Assessment & Plan    Resolved with IV fluids  Generalized weakness   Assessment & Plan    -Reports generalized weakness X 2 days prior to admission   -the patient is being discharged to [facility]          Rectal prolapse   Assessment & Plan    -likely source of occult chronic GI bleed  -outpatient GI follow up  Pressure ulcer of sacral region, stage 2   Assessment & Plan    -reposition patient frequently   -accumax air mattress with air pump   -soft care cushion when OOB to chair   -follow up with wound care clinic  Hyperlipidemia   Assessment & Plan    -Continue statins        Urinary retention   Assessment & Plan    -patient was noted to have urinary retention and a augustin catheter was placed during her hospitalization   -augustin catheter was removed prior to discharge however the patient failed the voiding trial and the augustin catheter was re-inserted   -the patient was discharged with a augustin catheter in place   -she will need to follow up with urology in 1 week for a voiding trial          Partial small bowel obstruction ([facility] [address] )   Assessment & Plan    -partial small bowel obstruction seen on CT scan  -Generals surgery consulted and recommended conservative management   -patient was initially made NPO  As her symptoms improved she was started on a clear liquid diet which was advanced as tolerated    -by the day of discharge the patient's symptoms had completely resolved and was tolerating a regular diet               Discharging Physician / Practitioner: Aaron Pierce PA-C  PCP: Abdirizak Fletcher DO  Admission Date:   Admission Orders     Ordered        07/08/18 2115  Inpatient Admission (expected length of stay for this patient is greater than two midnights)  Once             Discharge Date: 07/18/18    Resolved Problems  Date Reviewed: 7/18/2018          Resolved    Mass of right axilla 7/12/2018     Resolved by  Finn Alvarez MD    Gram-positive cocci bacteremia 7/11/2018     Resolved by  Finn Alvarez MD          Consultations During Hospital Stay:  · Infectious disease  · General surgery  · Nephrology  · Wound care    Procedures Performed:     · CT chest abdomen pelvis: Findings concerning for early or partial small bowel obstruction related to a herniated small bowel loop within a right lower quadrant ventral hernia  5 4 cm fluid collection in the right axilla, new since the prior study   This may represent a seroma or resolving hematoma  Multiple chronic right rib and pelvic fractures   Chronic right shoulder dislocation also again identified  · US kidney and bladder: Negative for evidence of obstructive uropathy  Small right perinephric fluid collection, nonspecific (but may be related to infection)  · US extremity soft tissue: Complex right axillary lesion likely a abscess or complicated cyst/hematoma   Correlation with the clinical findings recommended in this regard and, if warranted, short interval follow-up following appropriate clinical therapy suggested  Significant Findings / Test Results:     · Sodium 133, Creatinine 2 47, Iron 13    Incidental Findings:   · none     Test Results Pending at Discharge (will require follow up):   · none     Outpatient Tests Requested:  · none    Complications:  none    Reason for Admission: NANETTE, hyponatremia, partial small bowel obstruction  Hospital Course:     Milvia Mustafa is a 82 y o  female patient who originally presented to the hospital on 7/8/2018 due to generalized weakness over the past two day  Patient states that she is also having episodes of dizziness and the weakness seem to be getting worst  She states that she is unable to walk without support and has not been eating well  She denies, chest pain, SOB, N/V/D, fever, chills, Abdominal pain  She states that she has been having decreased urine output  She does have hx of urinary retention       Labs completed in the ER with results as shown below   CT chest abdomen and pelvis with results as shown below       Patient is being admitted on inpatient status med surg level care for further workup and management of NANETTE, Generalized weakness, Hyponatremia, Leukocytosis, partial small bowel obstruction,    Please see above list of diagnoses and related plan for additional information  Condition at Discharge: good     Discharge Day Visit / Exam:     Subjective:    Vitals: Blood Pressure: 144/63 (07/18/18 1524)  Pulse: 75 (07/18/18 1524)  Temperature: 98 1 °F (36 7 °C) (07/18/18 1524)  Temp Source: Temporal (07/18/18 1524)  Respirations: 18 (07/18/18 1524)  Height: 4' 8" (142 2 cm) (There appears to be some discrepancy in accurate ht  Per chart review:  4'2"  However, pt states 4'9" and pt's family reports 4'8") (07/09/18 1337)  Weight - Scale: 59 4 kg (130 lb 15 3 oz) (07/18/18 0600)  SpO2: 98 % (07/18/18 1524)  Exam:   Physical Exam   Constitutional: She is oriented to person, place, and time  She appears well-developed and well-nourished  HENT:   Head: Normocephalic and atraumatic  Cardiovascular: Normal rate and regular rhythm  Pulmonary/Chest: Effort normal and breath sounds normal  No respiratory distress  She has no wheezes  She has no rales  Abdominal: Soft  Bowel sounds are normal  She exhibits no distension  There is no tenderness  Neurological: She is alert and oriented to person, place, and time  No cranial nerve deficit  Skin: Skin is warm and dry  Nursing note and vitals reviewed  Discussion with Family: n/a    Discharge instructions/Information to patient and family:   See after visit summary for information provided to patient and family  Provisions for Follow-Up Care:  See after visit summary for information related to follow-up care and any pertinent home health orders  Disposition:     [facility]  For Discharges to [facility]:   · Not Applicable to this Patient - Not Applicable to this Patient    Planned Readmission: no     Discharge Statement:  I spent 25 minutes discharging the patient  This time was spent on the day of discharge  I had direct contact with the patient on the day of discharge   Greater than 50% of the total time was spent examining patient, answering all patient questions, arranging and discussing plan of care with patient as well as directly providing post-discharge instructions  Additional time then spent on discharge activities  Discharge Medications:  See after visit summary for reconciled discharge medications provided to patient and family        ** Please Note: This note has been constructed using a voice recognition system **

## 2018-07-18 NOTE — ASSESSMENT & PLAN NOTE
-blood pressure improved  -Lopressor and divan restarted on 7/15/18   -Norvasc restarted on 7/16/18

## 2018-07-18 NOTE — ASSESSMENT & PLAN NOTE
-2/2 blood cultures positive for Staphylococcus aureus  -patient initially started on IV Vancomycin which was switched to IV Ancef on 7/12/18 based on blood cultures   -Per ID patient will need 2 weeks of therapy from 7/12 to 7/26   -blood culture results from 7/14/18 show no growth at 24 hours    -IR consulted and placed PICC Line 7/18/18    -the patient received 1 dose of IV antibiotics through PICC line before discharge   -weekly CBC and CMP to be faxed to ID office attn:Dr Gio Souza at [phone]   -patient will need 1 week outpatient follow up with Dr Gio Souza (ID)

## 2018-07-18 NOTE — PROGRESS NOTES
Report called to Brittany Wilkinson at [facility]

## 2018-07-18 NOTE — OCCUPATIONAL THERAPY NOTE
OT Note     07/18/18 1140   Restrictions/Precautions   Other Precautions Fall Risk;Pain;Bed Alarm   Bed Mobility   Rolling L 5  Supervision   Additional items Bedrails;Verbal cues   Supine to Sit 5  Supervision   Additional items Bedrails; Increased time required   Sit to Supine 3  Moderate assistance   Additional items Assist x 2   Additional Comments A for positioning for comfort and algnment   Transfers   Sit to Stand 4  Minimal assistance   Additional items Assist x 2;Verbal cues   Stand to Sit 4  Minimal assistance   Additional items Assist x 2;Impulsive   Functional Mobility   Functional Mobility (Refer PT note)   Activity Tolerance   Activity Tolerance (Tolerates tx session)   Assessment   Assessment Presents supine, agreeable to participate  States RUE "feeling better " Completes supine to sit with S and increased time to complete   Completes sit to strand with min A x 2  Returned supine following mobility with A PT  A x 2 sit to supine, A to position for position and alignment  SCDs applied and turned on  Call bell in reach     Recommendation   OT Discharge Recommendation Short Term Rehab

## 2018-07-27 NOTE — PROGRESS NOTES
Assessment/Plan:    Abscess of axilla, right  Status post incision and drainage of a large right axillary abscess  Still has some mild tenderness  Noted fluctuance and some mild erythema with serous drainage and some fibrous exudate material   Vitals otherwise stable no fevers or chills    -patient should continue her IV antibiotics as per ID a via PICC line  -given the fluctuance some mild redness on exam I am concerned there may be additional collection and thus will send her for a ultrasound       Diagnoses and all orders for this visit:    Abscess of axilla, right          Subjective:      Patient ID: Ligia Devine is a 82 y o  female  An 57-year-old female who presents today for evaluation of left axillary wound status post incision and drainage of large right axillary abscess by my partner Dr Catalina Santos  Currently she states there is still some mild tenderness at that area and the area continues to drain considerably  Denies any nausea vomiting  No fevers or chills  She continues to undergo IV antibiotic treatment as per ID recommendations given her previous hospitalization and noted bacteremia  The following portions of the patient's history were reviewed and updated as appropriate:   She  has a past medical history of Acute respiratory failure with hypoxia and hypercapnia ([address] ); Arthritis; Asthma; Hypertension; Pressure ulcer of sacral region, stage 2 (7/11/2018); Stroke [facility]); and Uncontrolled stage 2 hypertension (7/15/2018)    She   Patient Active Problem List    Diagnosis Date Noted    Essential hypertension 07/15/2018    Abscess of axilla, right 07/12/2018    Pressure ulcer of sacral region, stage 2 07/11/2018    Bacteremia due to methicillin susceptible Staphylococcus aureus (MSSA)     Generalized weakness 07/08/2018    Acute kidney injury ([address] ) 07/08/2018    Hyponatremia 07/08/2018    Partial small bowel obstruction ([address] ) 07/08/2018    Hyperglycemia 07/08/2018    Leukocytosis 07/08/2018    Rectal prolapse 05/03/2018    Vaginal prolapse 04/13/2018    Elevated troponin 04/13/2018    Iron deficiency anemia 04/12/2018    Hematochezia 01/11/2017    Urinary retention 12/08/2016    Benign essential hypertension 12/01/2016    GI bleed 12/01/2016    Hyperlipidemia 12/01/2016    Atrial fibrillation ([facility] [address] ) 12/01/2016    Anemia 12/01/2016    Dislocation of shoulder region, right, initial encounter 11/29/2016    Acute respiratory failure with hypoxia and hypercapnia ([facility] [address] ) 11/29/2016     She  has a past surgical history that includes Eye surgery; Rectal prolapse repair; Closed reduction humerus fracture (Right, 11/29/2016); Closed reduction humerus fracture (Right, 4/13/2018); and Incision and drainage of wound (Right, 7/12/2018)  Her family history includes Hypertension in her son  She  reports that she has never smoked  She has never used smokeless tobacco  She reports that she does not drink alcohol or use drugs    Current Outpatient Prescriptions   Medication Sig Dispense Refill    amLODIPine (NORVASC) 5 mg tablet Take 1 tablet (5 mg total) by mouth daily 30 tablet 0    ascorbic acid (VITAMIN C) 500 MG tablet Take 1 tablet (500 mg total) by mouth daily 30 tablet 0    doxazosin (CARDURA) 4 mg tablet Take 4 mg by mouth daily at bedtime      ferrous sulfate 220 (44 Fe) mg/5 mL solution Take 6 8 mL (300 mg total) by mouth daily 150 mL 0    hydrocortisone (ANUSOL-HC, PROCTOSOL HC,) 2 5 % rectal cream Insert into the rectum 2 (two) times a day 30 g 0    hydrocortisone-pramoxine (PROCTOFOAM-HC) rectal foam Insert 1 applicator into the rectum 2 (two) times a day 10 g 0    losartan-hydrochlorothiazide (HYZAAR) 100-12 5 MG per tablet Take 1 tablet by mouth daily      menthol-zinc oxide (CALMOSEPTINE) 0 44-20 6 % OINT Apply topically 2 (two) times a day 71 g 0    metoprolol tartrate (LOPRESSOR) 50 mg tablet Take 1 tablet (50 mg total) by mouth every 12 (twelve) hours  0    oxybutynin (DITROPAN) 5 mg tablet Take 5 mg by mouth daily        pantoprazole (PROTONIX) 40 mg tablet Take 1 tablet (40 mg total) by mouth 2 (two) times a day before meals  0    senna-docusate sodium (SENOKOT S) 8 6-50 mg per tablet Take 1 tablet by mouth 2 (two) times a day 60 tablet 0    valsartan (DIOVAN) 160 mg tablet Take 1 tablet (160 mg total) by mouth 2 (two) times a day 60 tablet 0    atorvastatin (LIPITOR) 20 mg tablet Take 1 tablet by mouth daily with dinner for 30 days 30 tablet 0     No current facility-administered medications for this visit  She has No Known Allergies       Review of Systems   Constitutional: Negative  Skin: Positive for color change and wound (Right axillary)  Negative for pallor and rash  Objective:      /56   Pulse 75   Temp 98 1 °F (36 7 °C)          Physical Exam   Constitutional: She appears well-developed and well-nourished  No distress  Skin: Skin is warm  No rash noted  She is not diaphoretic  There is erythema  No pallor  There is a large approximately 5 cm fluctuant area under the right axilla consistent with previous incision and drainage site  On palpation there is a noted serous drainage coming out approximately 3 different small openings  There is also some noted fibrous exudate material   There is some mild tenderness palpation at that area  There is some mild erythema as well  Vitals reviewed

## 2018-07-27 NOTE — ED PROVIDER NOTES
History  Chief Complaint   Patient presents with    Wound Check     Patient was seen by Dr Davi Ugalde for an abcess under her right arm  Patient is a [facility]  [facility] sent the patient because her abcess is draining  Patient had drainage and pain at the site  This is an 61-year-old woman with the noted past medical history who presents to the emergency department from [address] for evaluation of a possibly worsening abscess in the right axilla  This area underwent previous incision/drainage by Dr Davis Setting in the [facility] on 12 July 2018  Had been admitted previously on 8 July 2018 for NANETTE--was found to have bacteremia thought t be secondary to R axillary abscess  Was seen in f/u yesterday by Dr Davi Ugalde and there was clinical concern for re-accumulation of the abscess cavity  Ultrasound of the area was ordered which she underwent today demonstrating a residual 5 cm complex fluid collection that is concerning for recurrent abscess  She was sent to the emergency department from her nursing facility for evaluation of this  There is a report (by EMS) of continued drainage from the incision sites and the patient does complain of pain in the area  This is worse with direct manipulation of the area but not AROM of the R shoulder  She has poor short-term and remote memory and accordingly further details are limited  Does have RUE PICC line for which she is receiving cefazolin infusions 2000 mg q8 hr     Upon my evaluation, she complained of some mild dyspnea and did state that she has a history of asthma  She was unable to describe how long this has been present or give further details about aggravating/relieving factors  States she believes it has not been treated since she has been admitted to [facility]; there are no asthma controlling medications of note that are currently ordered for her in her medication reconciliation    Denies fever/chills/cough/syncope/chest pain/palpitations/nausea/vomiting  I reviewed the outpatient ultrasound result demonstrating a suspected ongoing soft tissue abscess of the right axilla  This will require re-exploration to ensure adequate drainage of accumulated abscess cavity  Simultaneously, she has wheezing suggestive of acute bronchospasm with differential diagnosis including but not limited to asthma/lower respiratory tract infection/CHF/pneumothorax  Will check EKG/chest x-ray and basic labs  Will give albuterol treatment and oral steroid  History provided by:  EMS personnel, patient and medical records  Wound Check          Prior to Admission Medications   Prescriptions Last Dose Informant Patient Reported? Taking?    Multiple Vitamin (MULTIVITAMIN) capsule   Yes Yes   Sig: Take 1 capsule by mouth daily   amLODIPine (NORVASC) 5 mg tablet  Self No Yes   Sig: Take 1 tablet (5 mg total) by mouth daily   ascorbic acid (VITAMIN C) 500 MG tablet  Self No Yes   Sig: Take 1 tablet (500 mg total) by mouth daily   atorvastatin (LIPITOR) 20 mg tablet   No Yes   Sig: Take 1 tablet by mouth daily with dinner for 30 days   doxazosin (CARDURA) 4 mg tablet  Self Yes Yes   Sig: Take 4 mg by mouth daily at bedtime   ferrous sulfate 220 (44 Fe) mg/5 mL solution  Self No Yes   Sig: Take 6 8 mL (300 mg total) by mouth daily   furosemide (LASIX) 20 mg tablet   Yes Yes   Sig: Take 20 mg by mouth 2 (two) times a day   hydrocortisone (ANUSOL-HC, PROCTOSOL HC,) 2 5 % rectal cream  Self No Yes   Sig: Insert into the rectum 2 (two) times a day   hydrocortisone-pramoxine (PROCTOFOAM-HC) rectal foam  Self No Yes   Sig: Insert 1 applicator into the rectum 2 (two) times a day   losartan-hydrochlorothiazide (HYZAAR) 100-12 5 MG per tablet  Self Yes Yes   Sig: Take 1 tablet by mouth daily   menthol-zinc oxide (CALMOSEPTINE) 0 44-20 6 % OINT  Self No Yes   Sig: Apply topically 2 (two) times a day   metoprolol tartrate (LOPRESSOR) 50 mg tablet  Self No Yes   Sig: Take 1 tablet (50 mg total) by mouth every 12 (twelve) hours   oxybutynin (DITROPAN) 5 mg tablet  Self Yes Yes   Sig: Take 5 mg by mouth daily     pantoprazole (PROTONIX) 40 mg tablet  Self No Yes   Sig: Take 1 tablet (40 mg total) by mouth 2 (two) times a day before meals   senna-docusate sodium (SENOKOT S) 8 6-50 mg per tablet  Self No Yes   Sig: Take 1 tablet by mouth 2 (two) times a day   valsartan (DIOVAN) 160 mg tablet  Self No Yes   Sig: Take 1 tablet (160 mg total) by mouth 2 (two) times a day   zinc sulfate (ZINCATE) 220 mg capsule   Yes Yes   Sig: Take 220 mg by mouth daily      Facility-Administered Medications: None       Past Medical History:   Diagnosis Date    Acute respiratory failure with hypoxia and hypercapnia (HCC)     Arthritis     Asthma     Hypertension     Pressure ulcer of sacral region, stage 2 7/11/2018    Stroke (HCC)     residual RLE weakness    Uncontrolled stage 2 hypertension 7/15/2018       Past Surgical History:   Procedure Laterality Date    CLOSED REDUCTION HUMERUS FRACTURE Right 11/29/2016    Procedure: CLOSED REDUCTION ARM/HUMERUS  CLOSED VS  OPEN SHOULDER REDUCTION;  Surgeon: Zara Lares MD;  Location: [facility];  Service:     CLOSED REDUCTION HUMERUS FRACTURE Right 4/13/2018    Procedure: CLOSED REDUCTION ARM/HUMERUS;  Surgeon: Zara Lares MD;  Location: [facility];  Service: Orthopedics    EYE SURGERY      bilateral CAT EXT W/IOLs    INCISION AND DRAINAGE OF WOUND Right 7/12/2018    Procedure: INCISION AND DRAINAGE (I&D) TRUNK;  Surgeon: Wisam Waldron MD;  Location: [facility];  Service: General    RECTAL PROLAPSE REPAIR      done 5-6 yrs ago       Family History   Problem Relation Age of Onset    Hypertension Son      I have reviewed and agree with the history as documented      Social History   Substance Use Topics    Smoking status: Never Smoker    Smokeless tobacco: Never Used    Alcohol use No        Review of Systems   Constitutional: Positive for fatigue  Negative for chills and fever  Respiratory: Positive for chest tightness and shortness of breath  Negative for cough  Cardiovascular: Negative for chest pain and palpitations  Musculoskeletal: Positive for arthralgias (Pain with movement of R shoulder)  Skin: Positive for wound (R axilla)  Negative for color change, pallor and rash  Hematological: Negative for adenopathy  Does not bruise/bleed easily  All other systems reviewed and are negative  Physical Exam  Physical Exam   Constitutional: She appears well-developed and well-nourished  She is cooperative  She appears distressed (mild respiratory distress)  HENT:   Head: Normocephalic and atraumatic  Right Ear: Hearing and external ear normal    Left Ear: Hearing and external ear normal    Nose: Nose normal    Neck: Trachea normal, normal range of motion and phonation normal  Neck supple  No JVD present  No tracheal tenderness, no spinous process tenderness and no muscular tenderness present  No tracheal deviation present  No thyroid mass and no thyromegaly present  Cardiovascular: Normal rate, regular rhythm, S1 normal, S2 normal and intact distal pulses  Exam reveals no gallop and no friction rub  Murmur heard  Crescendo systolic (Loudest at RUSB) murmur is present with a grade of 3/6   Pulses:       Radial pulses are 2+ on the right side, and 2+ on the left side  Dorsalis pedis pulses are 2+ on the right side, and 2+ on the left side  Posterior tibial pulses are 2+ on the right side, and 2+ on the left side  Pulmonary/Chest: No stridor  She is in respiratory distress (mild respiratory distress; speaks in full sentences)  She has no decreased breath sounds  She has wheezes (mild expiratory wheeze diffusely bilaterally)  She has no rhonchi  She has no rales  She exhibits no tenderness  Abdominal: Soft  She exhibits no distension and no mass  There is no tenderness   There is no rigidity, no rebound, no guarding and no CVA tenderness  Musculoskeletal: Normal range of motion  She exhibits no edema, tenderness or deformity  Lymphadenopathy:     She has no cervical adenopathy  Neurological: She is alert  She is disoriented (Oriented to person/place)  GCS eye subscore is 4  GCS verbal subscore is 5  GCS motor subscore is 6  Skin: Skin is warm, dry and intact  She is not diaphoretic  There is erythema  Psychiatric: She has a normal mood and affect  Her speech is normal and behavior is normal  She exhibits abnormal recent memory (Recalls few details of recent incision/drainage procedure of R arm that occurred 1d prior ) and abnormal remote memory  Nursing note and vitals reviewed        Vital Signs  ED Triage Vitals [07/27/18 1833]   Temperature Pulse Respirations Blood Pressure SpO2   99 3 °F (37 4 °C) 88 20 (!) 181/77 97 %      Temp Source Heart Rate Source Patient Position - Orthostatic VS BP Location FiO2 (%)   Temporal Monitor Lying Left arm --      Pain Score       Worst Possible Pain           Vitals:    07/27/18 1833 07/27/18 2030 07/27/18 2200 07/27/18 2300   BP: (!) 181/77 137/63 138/62 146/63   Pulse: 88 84 83 78   Patient Position - Orthostatic VS: Lying Lying Lying Lying       Visual Acuity      ED Medications  Medications   lidocaine-epinephrine (XYLOCAINE/EPINEPHRINE) 1 %-1:100,000 injection 20 mL (not administered)   doxazosin (CARDURA) tablet 4 mg (not administered)   albuterol inhalation solution 5 mg (5 mg Nebulization Given 7/27/18 1846)   predniSONE tablet 40 mg (40 mg Oral Given 7/27/18 1845)       Diagnostic Studies  Results Reviewed     Procedure Component Value Units Date/Time    Troponin I [[id number]]  (Normal) Collected:  07/27/18 2033    Lab Status:  Final result Specimen:  Blood from Central Venous Line Updated:  07/27/18 2056     Troponin I <0 02 ng/mL     Basic metabolic panel [[id number]]  (Abnormal) Collected:  07/27/18 2033    Lab Status:  Final result Specimen:  Blood from Central Venous Line Updated:  07/27/18 2048     Sodium 140 mmol/L      Potassium 3 7 mmol/L      Chloride 103 mmol/L      CO2 32 mmol/L      Anion Gap 5 mmol/L      BUN 25 mg/dL      Creatinine 1 13 mg/dL      Glucose 156 (H) mg/dL      Calcium 8 5 mg/dL      eGFR 46 ml/min/1 73sq m     Narrative:         National Kidney Disease Education Program recommendations are as follows:  GFR calculation is accurate only with a steady state creatinine  Chronic Kidney disease less than 60 ml/min/1 73 sq  meters  Kidney failure less than 15 ml/min/1 73 sq  meters  Magnesium [[id number]]  (Normal) Collected:  07/27/18 2033    Lab Status:  Final result Specimen:  Blood from Central Venous Line Updated:  07/27/18 2048     Magnesium 1 6 mg/dL     Protime-INR [[id number]]  (Normal) Collected:  07/27/18 2033    Lab Status:  Final result Specimen:  Blood from Central Venous Line Updated:  07/27/18 2047     Protime 13 2 seconds      INR 1 05    CBC and differential [[id number]]  (Abnormal) Collected:  07/27/18 2033    Lab Status:  Final result Specimen:  Blood from Central Venous Line Updated:  07/27/18 2039     WBC 4 33 Thousand/uL      RBC 2 62 (L) Million/uL      Hemoglobin 7 9 (L) g/dL      Hematocrit 25 3 (L) %      MCV 97 fL      MCH 30 2 pg      MCHC 31 2 (L) g/dL      RDW 16 8 (H) %      MPV 9 4 fL      Platelets 464 (L) Thousands/uL      Neutrophils Relative 70 %      Lymphocytes Relative 22 %      Monocytes Relative 7 %      Eosinophils Relative 1 %      Basophils Relative 1 %      Neutrophils Absolute 3 03 Thousands/µL      Lymphocytes Absolute 0 93 Thousands/µL      Monocytes Absolute 0 29 Thousand/µL      Eosinophils Absolute 0 06 Thousand/µL      Basophils Absolute 0 02 Thousands/µL                  XR shoulder 2+ views RIGHT   ED Interpretation by Juan Pablo Gonzales DO (07/27 1949)   Anterior dislocation R shoulder  No acute fracture        Final Result by Delon Whitfield DO (07/27 2050)   Chronic anterior right shoulder dislocation  Workstation performed: [id number]         XR chest 1 view portable   ED Interpretation by Rahul Valdes DO (07/27 1921)   Airway midline  Lungs clear bilaterally with no pulmonary vascular congestion/focal infiltrate/pneumothorax  Cardiac silhouette within normal limits  Right-sided PICC line extends into the superior vena cava  There is a right-sided shoulder dislocation  Final Result by Brien Mendoza DO (07/27 2048)   No acute cardiopulmonary disease  Workstation performed: [id number]                    Procedures  ECG 12 Lead Documentation  Date/Time: 7/27/2018 7:40 PM  Performed by: Rishi Becker  Authorized by: Rishi Becker     Indications / Diagnosis:  Dyspnea/wheeze  ECG reviewed by me, the ED Provider: yes    Patient location:  ED  Previous ECG:     Previous ECG:  Compared to current    Comparison ECG info:  8 July 2018    Similarity:  No change    Comparison to cardiac monitor: No    Interpretation:     Interpretation: normal    Rate:     ECG rate:  91    ECG rate assessment: normal    Rhythm:     Rhythm: sinus rhythm    Ectopy:     Ectopy: none    QRS:     QRS axis:  Normal    QRS intervals:  Normal  Conduction:     Conduction: normal    ST segments:     ST segments:  Normal  T waves:     T waves: normal    Comments:      Pr 124 qrs 68 qtc 440  Incision/Drainage  Date/Time: 7/28/2018 12:08 AM  Performed by: Luke Esteban by: Rishi Becker     Patient location:  ED  Other Assisting Provider: Yes (comment)    Consent:     Consent obtained:  Emergent situation (Pt is incapable of consenting to procedure  Extensive attempts were made to contact patient's listed POA (her son) but no reply was received   I elected to proceed under administrative consent given that consent was given previously for the same procedure)  Universal protocol:     Patient identity confirmed:  Verbally with patient and arm band  Location:     Type:  Abscess    Size: 4 cm    Location: R axilla  Pre-procedure details:     Skin preparation:  Betadine  Anesthesia (see MAR for exact dosages): Anesthesia method:  Local infiltration    Local anesthetic:  Lidocaine 1% WITH epi (6 ml)  Procedure details:     Complexity:  Simple    Incision types:  Stab incision    Scalpel blade:  11    Approach:  Open    Incision depth:  Skin and subcutaneous    Wound management:  Probed and deloculated    Drainage:  Serosanguinous (Thick yellow fibrinous material in wound base was exposed further during I&D but was not excised)    Drainage amount: Moderate    Wound treatment:  Wound left open    Packing materials:  None  Post-procedure details:     Patient tolerance of procedure: Tolerated well, no immediate complications           Phone Contacts  ED Phone Contact    ED Course  ED Course as of Jul 28 0015   Fri Jul 27, 2018   5456 R-sided shoulder dislocation noted on CXR  This was reduced previously on 13 April 2018 by Dr Wild Newton; his note indicated that due to large Hill-Sachs lesion, the humerus would not remain stable in the glenoid and that definitive treatment would require reverse shoulder arthroplasty  This was declined by patient's family at the time  CT cap obtained on 8 July 2018 demonstrated R shoulder dislocation again  I suspect that this is a chronic dislocation and may have been present for at least the past 3 weeks  Given that patient has evidence of dislocation now on CXR and that it has previously been assessed as unstable, I will avoid attempts at reduction now due to their high potential for failure and neurovascular injury  1410 Patient reevaluated at this time following completion of nebulizer treatment and administration of steroids  The patient reports improvement of symptoms  Repeat examination finds the patient awake and alert  The patient is in no respiratory distress  The patient is phonating in full sentences with no evidence of dysphonia/stridor   Repeat lung examination finds improved aeration bilaterally with few scattered wheezes and crackles/rhonchi  Several calls placed to patient's POA listed on paperwork were not successful: the party contacted Johanny Felton, patient's son at ) did not answer the phone and the voicemail box was full  2115 Another critical patient required my continuous attention and prevented me from reevaluating the patient until this time    1  WBC wnl   2  Hg/Hct anemic but stable from multiple prior studies  3  Plt mildly thrombocytopenic  4  Electrolytes wnl   5  INR wnl   6  Troponin negative  Call placed to patient's son's home number [phone] and message left on answering machine  I would require consent from patient's POA prior to performing any invasive procedures  2222 I attempted to contact both Dr Delano Ko and Dr Jeanette Gee who had previously seen and treated the patient although neither was available at that time  I did d/w Dr Brandyn Baird of trauma surgery via phone  States incision/drainage generally done at time it is found but could potentially be delayed in patient not experiencing sepsis from the abscess  Sat Jul 28, 2018   0011 Additional attempts were made to contact patient's son but no reply was received after ~2 5 hr of attempting to contact him  I elected to proceed with I&D procedure under administrative consent  Procedure completed without issue as per procedure note  Patient tolerated without difficulty  Arrangements will be made for patient to be returned to nursing home  Extensive instructions regarding further care at her nursing home were given to patient             MDM  Number of Diagnoses or Management Options  Asthma exacerbation, mild: established and improving  Axillary abscess: established and worsening     Amount and/or Complexity of Data Reviewed  Clinical lab tests: ordered and reviewed  Tests in the radiology section of CPT®: ordered and reviewed  Decide to obtain previous medical records or to obtain history from someone other than the patient: yes  Obtain history from someone other than the patient: yes  Review and summarize past medical records: yes  Discuss the patient with other providers: yes  Independent visualization of images, tracings, or specimens: yes    Risk of Complications, Morbidity, and/or Mortality  Presenting problems: high  Diagnostic procedures: high  Management options: high    Patient Progress  Patient progress: improved    CritCare Time    Disposition  Final diagnoses:   Axillary abscess   Asthma exacerbation, mild     Time reflects when diagnosis was documented in both MDM as applicable and the Disposition within this note     Time User Action Codes Description Comment    7/27/2018 11:33 PM Vik Grace Add [[id number]] Axillary abscess     7/27/2018 11:33 PM Vik Grace Add [[id number]] Asthma exacerbation, mild       ED Disposition     ED Disposition Condition Comment    Discharge  Francine Reaves discharge to home/self care  Condition at discharge: Stable        Follow-up Information     Follow up With Specialties Details Why Contact Info    Cuauhtemoc Berry DO General Surgery Schedule an appointment as soon as possible for a visit in 1 week For further evaluation of the right axillary abscess  [place] [address]  [phone]      Cristina Echeverria DO Family Medicine Schedule an appointment as soon as possible for a visit in 1 week For further evaluation of the medications needed to control asthma   [address] [place] [address]  [phone]            Patient's Medications   Discharge Prescriptions    ALBUTEROL (PROVENTIL HFA,VENTOLIN HFA) 90 MCG/ACT INHALER    Inhale 2 puffs every 4 (four) hours as needed for wheezing       Start Date: 7/27/2018 End Date: --       Order Dose: 2 puffs       Quantity: 1 Inhaler    Refills: 1    PREDNISONE 10 MG TABLET    Take 4 tablets (40 mg total) by mouth daily for 4 days Start Date: 7/28/2018 End Date: 8/1/2018       Order Dose: 40 mg       Quantity: 16 tablet    Refills: 0     No discharge procedures on file      ED Provider  Electronically Signed by           Rox Canela DO  07/28/18 1505

## 2018-07-27 NOTE — TELEPHONE ENCOUNTER
I was contacted via telephone approximately 5:00 p m  on July 27, 2018, by the patients home care facility, specifically [facility], regarding the ultrasound results of the right axilla of the patient  Wound as per them care facility the ultrasound was reading a 5 cm area underneath the right axilla containing a collection on likely consistent with abscess  Patient has now finished her antibiotic treatment  Her vitals remained stable and she is afebrile but each does complain of pain from that area  I stated that given the fact the ultrasound does show a persistent fluid collection consistent with abscess that she should be seen at the closest emergency room for further evaluation and potential bedside I and D versus surgical evaluation and potential incision and drainage under anesthesia as was done the last time      The home care facility agreed the patient will be transferred to [facility] emergency room for further evaluation

## 2018-07-27 NOTE — ASSESSMENT & PLAN NOTE
Status post incision and drainage of a large right axillary abscess  Still has some mild tenderness  Noted fluctuance and some mild erythema with serous drainage and some fibrous exudate material   Vitals otherwise stable no fevers or chills    -patient should continue her IV antibiotics as per ID a via PICC line  -given the fluctuance some mild redness on exam I am concerned there may be additional collection and thus will send her for a ultrasound  -continue local wound care with keeping the area clean and covered with a dry dressing, there is wound care services at the facility and therefore a wound care physician shouldcontinue to follow her

## 2018-07-28 NOTE — ED NOTES
Cyst drained  Xeroform placed over incision location and covered with an abd pad   Patient tolerated procedure well     Phillis Hodgkin Page, RN  07/28/18 0882

## 2018-07-28 NOTE — DISCHARGE INSTRUCTIONS
Abscess   WHAT YOU NEED TO KNOW:   A warm compress may help your abscess drain  Your healthcare provider may make a cut in the abscess so it can drain  You may need surgery to remove an abscess that is on your hands or buttocks  DISCHARGE INSTRUCTIONS:   Return to the emergency department if:   · The area around your abscess becomes very painful, warm, or has red streaks  · You have a fever and chills  · Your heart is beating faster than usual      · You feel faint or confused  Contact your healthcare provider if:   · Your abscess gets bigger or does not get better  · Your abscess returns  · You have questions or concerns about your condition or care  Medicines: You may  need any of the following:  · Antibiotics  help treat a bacterial infection  · Acetaminophen  decreases pain and fever  It is available without a doctor's order  Ask how much to take and how often to take it  Follow directions  Acetaminophen can cause liver damage if not taken correctly  · NSAIDs , such as ibuprofen, help decrease swelling, pain, and fever  This medicine is available with or without a doctor's order  NSAIDs can cause stomach bleeding or kidney problems in certain people  If you take blood thinner medicine, always ask your healthcare provider if NSAIDs are safe for you  Always read the medicine label and follow directions  · Take your medicine as directed  Contact your healthcare provider if you think your medicine is not helping or if you have side effects  Tell him or her if you are allergic to any medicine  Keep a list of the medicines, vitamins, and herbs you take  Include the amounts, and when and why you take them  Bring the list or the pill bottles to follow-up visits  Carry your medicine list with you in case of an emergency  Self-care:   · Apply a warm compress to your abscess  This will help it open and drain  Wet a washcloth in warm, but not hot, water  Apply the compress for 10 minutes  Repeat this 4 times each day  Do not  press on an abscess or try to open it with a needle  You may push the bacteria deeper or into your blood  · Do not share your clothes, towels, or sheets with anyone  This can spread the infection to others  · Wash your hands often  This can help prevent the spread of germs  Use soap and water or an alcohol-based hand rub  Care for your wound after it is drained:   · Care for your wound as directed  If your healthcare provider says it is okay, carefully remove the bandage and gauze packing  You may need to soak the gauze to get it out of your wound  Clean your wound and the area around it as directed  Dry the area and put on new, clean bandages  Change your bandages when they get wet or dirty  · Ask your healthcare provider how to change the gauze in your wound  Keep track of how many pieces of gauze are placed inside the wound  Do not put too much packing in the wound  Do not pack the gauze too tightly in your wound  Follow up with your healthcare provider in 1 to 3 days: You may need to have your packing removed or your bandage changed  Write down your questions so you remember to ask them during your visits  © 2017 [address]  Information is for End User's use only and may not be sold, redistributed or otherwise used for commercial purposes  All illustrations and images included in CareNotes® are the copyrighted property of [employer]  or [employer]  The above information is an  only  It is not intended as medical advice for individual conditions or treatments  Talk to your doctor, nurse or pharmacist before following any medical regimen to see if it is safe and effective for you  Asthma, Ambulatory Care   GENERAL INFORMATION:   Asthma  is a lung disease that makes breathing difficult  Chronic inflammation and reactions to triggers narrow the airways in your lungs   Asthma can become life-threatening if it is not managed  Common symptoms include the following:   · Coughing     · Wheezing     · Shortness of breath     · Chest tightness  Seek immediate care for the following symptoms:   · Severe shortness of breath    · Blue or gray lips or nails    · Skin around your neck and ribs pulls in with each breath    · Shortness of breath, even after you take your short-term medicine as directed     · Peak flow numbers in the red zone of your asthma action plan  Treatment for asthma  will depend on how severe it is  Medicine may decrease inflammation, open airways, and make it easier to breathe  Medicines may be inhaled, taken as a pill, or injected  Short-term medicines relieve your symptoms quickly  Long-term medicines are used to prevent future attacks  You may also need medicine to help control your allergies  Manage and prevent future asthma attacks:   · Follow your asthma action pan  This is a written plan that you and your healthcare provider create  It explains which medicine you need and when to change doses if necessary  It also explains how you can monitor symptoms and use a peak flow meter  The meter measures how well your lungs are working  · Manage other health conditions , such as allergies, acid reflux, and sleep apnea  · Identify and avoid triggers  These may include pets, dust mites, mold, and cockroaches  · Do not smoke and avoid others who smoke  If you smoke, it is never too late to quit  Ask your healthcare provider if you need help quitting  · Ask about a flu vaccine  The flu can make your asthma worse  You may need a yearly flu shot  Follow up with your healthcare provider as directed: You will need to return to make sure your medicine is working and your symptoms are controlled  You may be referred to an asthma or allergy specialist  Beatriz Russian may be asked to keep a record of your peak flow values and bring it with you to your appointments   Write down your questions so you remember to ask them during your visits  CARE AGREEMENT:   You have the right to help plan your care  Learn about your health condition and how it may be treated  Discuss treatment options with your caregivers to decide what care you want to receive  You always have the right to refuse treatment  The above information is an  only  It is not intended as medical advice for individual conditions or treatments  Talk to your doctor, nurse or pharmacist before following any medical regimen to see if it is safe and effective for you  © 2014 [address] is for End User's use only and may not be sold, redistributed or otherwise used for commercial purposes  All illustrations and images included in CareNotes® are the copyrighted property of [employer]  or Jacob Ly

## 2018-07-30 NOTE — ASSESSMENT & PLAN NOTE
A 5 centimeter recurrent fluid collection of the right axilla status post incision and drainage in the recent ER  Noted to have significant amount of fibrin is exudative material   Underwent selective debridement my office without complication  -patient should continue with wound care follow-up either in her current facility or she may be seen at [facility] wound care 's  She will need additional selective debridement in addition to drainage control

## 2018-07-30 NOTE — PROGRESS NOTES
FOLLOW UP - Infectious Disease   Jeannine Potter 82 y o  female MRN: [id number]  Unit/Bed#:  Encounter: [id number]      IMPRESSION & RECOMMENDATIONS:     82year old female with recent fall, right shoulder dislocation presented with Staph aureus bacteremia secondary to infected axillary hematoma and abscess     1  MSSA bacteremia  - Source likely from axillary abscess   - Negative TTE, rapid clearance of bacteremia and fu blood cultures clear, low suspicion for endocarditis  - Patient has completed 2 weeks of iv Cefazolin     · Recommend discontinuing PICC  · Seek immediate medical care if fever, chills, diarrhea     2  Axillary abscess  - Likely followed infected hematoma from recent shoulder manipulation  - s/p I and D on 7/12, surgical cultures grew MSSA  - persistent fluid collection noted on follow up, wound was deloculated and opened on 7/27  No evidence of active infection/persistent abscess     · No further antibiotics at present  · Exudative material was debrided in the office by surgery, recommend wound care consult and follow up  · Return if worsening symptoms, wound fails to heal or has persistent drainage       HISTORY OF PRESENT ILLNESS:    HPI: Jeannine Potter is a 82y o  year old female with recent fall, right shoulder dislocation who was admitted in July with Staph aureus bacteremia secondary to axillary hematoma and abscess s/p I and D  She was discharged to complete 2 weeks of iv cefazolin  INTERVAL HISTORY:  She was seen by Dr TORRES [facility] on 7/26 and exam was notable for mild but persistent tenderness and fluctuance and drainage from the area  An ultrasound done through her facility showed a "residual 5 cm complex fluid collection" and she was referred to the ER on 7/27  An I and D was done and wound was probed and deloculated with serosanguinous drainage  Wound was left open  Of note, 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  The patient has no complaints   Continues to have pain but this is unchanged from the time of her shoulder dislocation  Reports only minimal wound drainage  Has been off antibiotics for >72 hours  Denies fevers, chills, or sweats  Denies nausea, vomiting, or diarrhea  REVIEW OF SYSTEMS:  A complete 12 point system-based review of systems is otherwise negative  PAST MEDICAL HISTORY:  Past Medical History:   Diagnosis Date    Acute respiratory failure with hypoxia and hypercapnia (HCC)     Arthritis     Asthma     Hypertension     Pressure ulcer of sacral region, stage 2 7/11/2018    Stroke ([address] )     residual RLE weakness    Uncontrolled stage 2 hypertension 7/15/2018     Past Surgical History:   Procedure Laterality Date    CLOSED REDUCTION HUMERUS FRACTURE Right 11/29/2016    Procedure: CLOSED REDUCTION ARM/HUMERUS  CLOSED VS  OPEN SHOULDER REDUCTION;  Surgeon: Adolph Copeland MD;  Location: [facility];  Service:     CLOSED REDUCTION HUMERUS FRACTURE Right 4/13/2018    Procedure: CLOSED REDUCTION ARM/HUMERUS;  Surgeon: Adolph Copeland MD;  Location: [facility];  Service: Orthopedics    EYE SURGERY      bilateral CAT EXT W/IOLs    INCISION AND DRAINAGE OF WOUND Right 7/12/2018    Procedure: INCISION AND DRAINAGE (I&D) TRUNK;  Surgeon: Dianna Kiran MD;  Location: [facility];  Service: General    RECTAL PROLAPSE REPAIR      done 5-6 yrs ago       FAMILY HISTORY:  Non-contributory    SOCIAL HISTORY:  Social History   History   Alcohol Use No     History   Drug Use No     History   Smoking Status    Never Smoker   Smokeless Tobacco    Never Used       ALLERGIES:  No Known Allergies    MEDICATIONS:  All current active medications have been reviewed    Antibiotics:    PHYSICAL EXAM:  Vitals:    07/30/18 1031   BP: 108/56   Pulse: 70   Temp: 98 1 °F (36 7 °C)         General Appearance:  Appearing well, nontoxic, and in no distress   Head:  Normocephalic, without obvious abnormality, atraumatic   Eyes:  Conjunctiva pink and sclera anicteric, both eyes   Nose: Nares normal, mucosa normal, no drainage   Throat: Oropharynx moist without lesions   Neck: Supple, symmetrical, no adenopathy, no tenderness/mass/nodules   Back:   Symmetric, no curvature, ROM normal, no CVA tenderness   Lungs:   Clear to auscultation bilaterally, respirations unlabored   Chest Wall:  No tenderness or deformity   Heart:  RRR; no murmur, rub or gallop   Abdomen:   Soft, non-tender, non-distended, positive bowel sounds,    Extremities: Right axillary wound noted over 2 x 2 in with thick fibrinous exudate over base but no periwound erythema, no fluctuance, no warmth  Skin: No rashes or lesions  No draining wounds noted  Lymph nodes: Cervical, supraclavicular nodes normal   Neurologic: Alert and oriented times 3       LABS, IMAGING, & OTHER STUDIES:  Lab Results:  I have personally reviewed pertinent labs  Lab Results   Component Value Date     07/27/2018    K 3 7 07/27/2018     07/27/2018    CO2 32 07/27/2018    ANIONGAP 5 07/27/2018    BUN 25 07/27/2018    CREATININE 1 13 07/27/2018    GLUCOSE 156 (H) 07/27/2018    CALCIUM 8 5 07/27/2018    AST 14 07/11/2018    ALT 14 07/11/2018    ALKPHOS 70 07/11/2018    PROT 5 1 (L) 07/11/2018    BILITOT 0 40 07/11/2018    EGFR 46 07/27/2018     Lab Results   Component Value Date    WBC 4 33 07/27/2018    HGB 7 9 (L) 07/27/2018    HCT 25 3 (L) 07/27/2018    MCV 97 07/27/2018     (L) 07/27/2018   No results found for: SEDRATE      Imaging Studies:   I have personally reviewed pertinent imaging study reports and images in PACS  Other Studies:   I have personally reviewed pertinent reports  Labs, medical tests and imaging studies were independently reviewed by me as noted above in HPI and old records were obtained and summarized as noted above in HPI

## 2018-08-14 NOTE — PROGRESS NOTES
[facility] - Outpatient Follow-up Note  Ligia Devine 82 y o  female MRN: [id number]  Encounter: [id number]          ASSESSMENT AND PLAN:      1  Iron deficiency anemia   -She was admitted in April with low hemoglobin of 3 8 after a fall, the risks and benefits of upper endoscopy were discussed with her and she and her son elected against endoscopic evaluation as her hemoglobin was stable and she did not have evidence of overt GI bleeding  Her hemoglobin improved after transfusion and on discharge from the hospital was 10    -Recent FOBT was negative  - her most recent hemoglobin on 07/27/2018 was 7 9  Her serum iron was low at 13    - she was recommended to follow up as an outpatient for consideration of IV venofer (this was not started in the hospital due to active infection)  -She politely declines any endoscopic evaluation for investigation of her anemia; her son is currently in agreement with this    -Consider hematology evaluation for consideration of IV venofer   -Continue oral iron supplementation   -Continue to monitor hgb as per her PCP    2  Recal prolapse   - she states that she has a rectal prolapse that does not bother her, she denies any pain or difficulty with bowel movements  The aide who is with her confirms that she does have a rectal prolapse    -She refuses physical examination in the office    -She refuses referral to colorectal surgery; discussed with her son who would like her to speak to a surgeon  He states that he would prefer that she have the prolapse corrected if surgery were offered  Provided referral to colorectal surgery; I recommended he accompany her to the visit to discuss the risks and benefits of surgical correction so that a mutual decision can be agreed upon  Follow up as needed    ____________________________________________________________    SUBJECTIVE:     82-year-old female past medical history hypertension, stroke, AFib, rectal prolapse and partial small bowel obstruction presents to the office for follow-up accompanied by an aide from the nursing home where she lives  She was admitted in April with low hemoglobin of 3 8 after a fall, the risks and benefits of upper endoscopy were discussed with her and she and her son elected against endoscopic evaluation as her hemoglobin was stable and she did not have evidence of overt GI bleeding  She was also recently admitted to the hospital in July and was found to have a partial small-bowel obstruction, this was managed conservatively and she improved  Her aide reports that she has mainly following up regarding her rectal prolapse, patient notes that when she has a bowel movement she does experience of prolapse of her rectum which "goes back in "  She denies any rectal pain or difficulty with bowel movements  She adamantly refuses any surgical evaluation, endoscopies or colonoscopies  REVIEW OF SYSTEMS IS OTHERWISE NEGATIVE        Historical Information   Past Medical History:   Diagnosis Date    Acute respiratory failure with hypoxia and hypercapnia (HCC)     Arthritis     Asthma     Hypertension     Pressure ulcer of sacral region, stage 2 7/11/2018    Stroke ([facility] [address] )     residual RLE weakness    Uncontrolled stage 2 hypertension 7/15/2018     Past Surgical History:   Procedure Laterality Date    CLOSED REDUCTION HUMERUS FRACTURE Right 11/29/2016    Procedure: CLOSED REDUCTION ARM/HUMERUS  CLOSED VS  OPEN SHOULDER REDUCTION;  Surgeon: Kellee Burger MD;  Location: [facility];  Service:     CLOSED REDUCTION HUMERUS FRACTURE Right 4/13/2018    Procedure: CLOSED REDUCTION ARM/HUMERUS;  Surgeon: Kellee Burger MD;  Location: [facility];  Service: Orthopedics    EYE SURGERY      bilateral CAT EXT W/IOLs    INCISION AND DRAINAGE OF WOUND Right 7/12/2018    Procedure: INCISION AND DRAINAGE (I&D) TRUNK;  Surgeon: Dmitry Milan MD;  Location: [facility];  Service: General    RECTAL PROLAPSE REPAIR      done 5-6 yrs ago     Social History   History   Alcohol Use No     History   Drug Use No     History   Smoking Status    Never Smoker   Smokeless Tobacco    Never Used     Family History   Problem Relation Age of Onset    Hypertension Son        Meds/Allergies       Current Outpatient Prescriptions:     albuterol (PROVENTIL HFA,VENTOLIN HFA) 90 mcg/act inhaler    alendronate (FOSAMAX) 70 mg tablet    amLODIPine (NORVASC) 5 mg tablet    ascorbic acid (VITAMIN C) 500 MG tablet    atorvastatin (LIPITOR) 20 mg tablet    doxazosin (CARDURA) 4 mg tablet    ferrous sulfate 220 (44 Fe) mg/5 mL solution    fluticasone-salmeterol (ADVAIR DISKUS) 100-50 mcg/dose inhaler    furosemide (LASIX) 20 mg tablet    hydrocortisone (ANUSOL-HC, PROCTOSOL HC,) 2 5 % rectal cream    hydrocortisone-pramoxine (PROCTOFOAM-HC) rectal foam    losartan-hydrochlorothiazide (HYZAAR) 100-12 5 MG per tablet    menthol-zinc oxide (CALMOSEPTINE) 0 44-20 6 % OINT    metoprolol tartrate (LOPRESSOR) 50 mg tablet    Multiple Vitamin (MULTIVITAMIN) capsule    oxybutynin (DITROPAN) 5 mg tablet    pantoprazole (PROTONIX) 40 mg tablet    senna-docusate sodium (SENOKOT S) 8 6-50 mg per tablet    valsartan (DIOVAN) 160 mg tablet    zinc sulfate (ZINCATE) 220 mg capsule    No Known Allergies        Objective     There were no vitals taken for this visit  PHYSICAL EXAM:      Physical Exam   Constitutional: She is oriented to person, place, and time  She is cooperative  No distress  In wheelchair   HENT:   Head: Normocephalic and atraumatic  Eyes: Right eye exhibits no discharge  Left eye exhibits no discharge  No scleral icterus  Neck: Neck supple  No tracheal deviation present  Cardiovascular: Normal rate, regular rhythm and normal heart sounds  Exam reveals no gallop and no friction rub  No murmur heard  Pulmonary/Chest: Effort normal  No respiratory distress  She has no wheezes  She has no rales  Abdominal: Soft  Bowel sounds are normal  She exhibits no distension  There is no tenderness  There is no rebound and no guarding  Genitourinary:   Genitourinary Comments: Refused rectal exam   Neurological: She is alert and oriented to person, place, and time  Skin: Skin is warm and dry  Psychiatric: She has a normal mood and affect  Lab Results:   No visits with results within 1 Day(s) from this visit     Latest known visit with results is:   Admission on 07/27/2018, Discharged on 07/28/2018   Component Date Value    WBC 07/27/2018 4 33     RBC 07/27/2018 2 62*    Hemoglobin 07/27/2018 7 9*    Hematocrit 07/27/2018 25 3*    MCV 07/27/2018 97     MCH 07/27/2018 30 2     MCHC 07/27/2018 31 2*    RDW 07/27/2018 16 8*    MPV 07/27/2018 9 4     Platelets 00/27/9797 141*    Neutrophils Relative 07/27/2018 70     Lymphocytes Relative 07/27/2018 22     Monocytes Relative 07/27/2018 7     Eosinophils Relative 07/27/2018 1     Basophils Relative 07/27/2018 1     Neutrophils Absolute 07/27/2018 3 03     Lymphocytes Absolute 07/27/2018 0 93     Monocytes Absolute 07/27/2018 0 29     Eosinophils Absolute 07/27/2018 0 06     Basophils Absolute 07/27/2018 0 02     Sodium 07/27/2018 140     Potassium 07/27/2018 3 7     Chloride 07/27/2018 103     CO2 07/27/2018 32     Anion Gap 07/27/2018 5     BUN 07/27/2018 25     Creatinine 07/27/2018 1 13     Glucose 07/27/2018 156*    Calcium 07/27/2018 8 5     eGFR 07/27/2018 46     Protime 07/27/2018 13 2     INR 07/27/2018 1 05     Troponin I 07/27/2018 <0 02     Magnesium 07/27/2018 1 6     Ventricular Rate 07/27/2018 91     Atrial Rate 07/27/2018 91     RI Interval 07/27/2018 124     QRSD Interval 07/27/2018 68     QT Interval 07/27/2018 358     QTC Interval 07/27/2018 440     P Axis 07/27/2018 93     QRS Axis 07/27/2018 65     T Wave Axis 07/27/2018 48          Radiology Results:   Xr Chest 1 View Portable    Result Date: 7/27/2018  Narrative: CHEST INDICATION:   dyspnea/wheeze +asthma  R-sided PICC line present  COMPARISON:  CT dated July 8, 2018 EXAM PERFORMED/VIEWS:  XR CHEST PORTABLE Single image FINDINGS:  The lungs are adequately aerated  No consolidation or effusion  Cardiac size top normal   No vascular congestion or peribronchial thickening  Atherosclerotic aorta  Bone mineralization is diminished  Old right-sided rib fractures with pleural thickening stable from prior exam   Anteriorly dislocated right shoulder reidentified  Right side PICC line catheter tip in the lower SVC  No pneumothorax  Impression: No acute cardiopulmonary disease  Workstation performed: [id number]     Xr Shoulder 2+ Views Right    Result Date: 7/27/2018  Narrative: RIGHT SHOULDER INDICATION:   R shoulder dislocation noted on CXR  COMPARISON:  CT dated April 12, 2018 and CT dated July 8, 2018 VIEWS:  XR SHOULDER 2+ VW RIGHT Images: 3 FINDINGS: Anteriorly dislocated right humerus in relation to the glenoid  This has been present on several prior examinations and does not appear changed  No obvious acute fractures  Old right-sided rib fractures with pleural deformity/pleural thickening  No lytic or blastic lesions are seen  Right side PICC line catheter tip in the lower SVC  Impression: Chronic anterior right shoulder dislocation  Workstation performed: [id number]     Ir Picc Line    Result Date: 7/18/2018  Narrative: EXAMINATION: PICC, with fluoroscopic and sonographic guidance  HISTORY: For long-term antibiotics  Expected duration of therapy of greater than 30 days  TECHNIQUE: Informed consent was obtained  The right arm was prepped and draped in the usual sterile fashion  Timeout was performed  Lidocaine was given as local anesthesia  Using ultrasound guidance, a 21 gauge needle was used to cannulate the brachial vein  Using fluoroscopic guidance, a wire was advanced to below the abida    The needle was exchanged, over the wire, for a peel away sheath  A PIC line was trimmed to 40 centimeters  The line was sterilely dressed  The patient's arms are conference, at the catheter entry site, measures 25 centimeters The patient tolerated the procedure well  There were no immediate complications or complaints  FINDINGS: Ultrasound images show, the vein is compressible, and free of thrombus  Brachial vein is paired  The basilic vein is absent  Fluoroscopic images show good line position in the Right atrium  Impression: Technically successful placement of PICC using sonographic and fluoroscopic guidance  This is the end of the clinically relevant portion of this report  Subsequent information is for compliance, documentation, and coding purposes  In the process of informed consent, information was communicated, verbally, to the patient regarding the procedure  The patient was informed of; the name of the procedure, nature of the procedure, nature of the condition, risks, benefits, alternatives, and potential complications, as well as the consequences of not having the procedure  All the patient's questions were answered  Informed consent was signed  Quoted risks include infection, as well as a 5% risk of thrombosis of the entry vein  Chlorhexidine and alcohol was used for cleansing and sterile preparation of the skin  This was allowed to dry prior to the application of sterile draping  Timeout was performed, with all team members present, and in agreement  Confirmation of patient, procedure, laterally, allergies, and all needed equipment was performed  When ultrasound was used for needle entry guidance, into the vein, static and real-time images of needle entry are obtained, and archived   PROCEDURE: PIC line PREPROCEDURE DIAGNOSIS: Please see "history ", above POSTPROCEDURE DIAGNOSIS: Same ANTIBIOTICS: None SPECIMEN: None ESTIMATED BLOOD LOSS: None ANESTHESIA: Local FLUOROSCOPY TIME: 0 6 Minutes RADIATION DOSE: 4 9 mGy Workstation performed: [id number]

## 2018-08-15 NOTE — TELEPHONE ENCOUNTER
I already spoke with him, documented it in my note  I provided a referral to colorectal surgery  I believe he is referring to the rectal prolapse here  If he would like to consider EGD/colon, please have him make an office visit and ask him to please come so we can all discuss together  Thanks!

## 2018-08-15 NOTE — TELEPHONE ENCOUNTER
Patients son called office  He was unable to make yesterday's appointment with his mom but was reading over everything today  He said he believes there was miscommunication  As he stated his mom does want to go forward with everything  His name is Jesus Dumont and can be reached at [phone]  What testing/procedures is recommended her her so they can be arranged  Also, I did giver her son the information to contact colon/rectal surgery as per office note        Please advise

## 2018-08-17 NOTE — ED PROVIDER NOTES
History  Chief Complaint   Patient presents with    Rectal Bleeding     according to nsg home pt had rectal bleeding this a m   pt has no complaints, or visible rectal bleeding at this time     26-year-old female with known history of rectal prolapse presents from nursing home with an episode of rectal bleeding after bowel movement today  Patient has longstanding history of rectal prolapse  Nursing home states this just occurred prior to arrival   Upon arrival to the emerged department the patient was at having no active rectal bleeding  She has no abdominal pain  A rectal prolapse has reduced on its own  Patient denies any symptoms  Patient was seen in the office by Dr Lito Mccauley  of General surgery Colorectal surgery on 8/15/2018 and refused surgery for her rectal prolapse at this time  History provided by:  Patient and EMS personnel  Rectal Bleeding - Minor   Quality:  Bright red  Amount:  Scant  Chronicity:  Chronic  Context: defecation    Associated symptoms: no abdominal pain and no dizziness    Risk factors: no anticoagulant use and no hx of colorectal cancer        Prior to Admission Medications   Prescriptions Last Dose Informant Patient Reported? Taking?    Multiple Vitamin (MULTIVITAMIN) capsule  Outside Facility (Specify) Yes Yes   Sig: Take 1 capsule by mouth daily   albuterol (PROVENTIL HFA,VENTOLIN HFA) 90 mcg/act inhaler  Outside Facility (Specify) No No   Sig: Inhale 2 puffs every 4 (four) hours as needed for wheezing   alendronate (FOSAMAX) 70 mg tablet  Outside Facility (Specify) Yes No   Sig: Take by mouth   amLODIPine (NORVASC) 5 mg tablet  Outside Facility (Specify) No No   Sig: Take 1 tablet (5 mg total) by mouth daily   ascorbic acid (VITAMIN C) 500 MG tablet  Outside Facility (Specify) No No   Sig: Take 1 tablet (500 mg total) by mouth daily   atorvastatin (LIPITOR) 20 mg tablet   No No   Sig: Take 1 tablet by mouth daily with dinner for 30 days   doxazosin (CARDURA) 4 mg tablet Outside Facility ([facility]]) Yes Yes   Sig: Take 4 mg by mouth daily at bedtime   ferrous sulfate 220 (44 Fe) mg/5 mL solution  Outside Facility (Specify) No Yes   Sig: Take 6 8 mL (300 mg total) by mouth daily   fluticasone-salmeterol (ADVAIR DISKUS) 100-50 mcg/dose inhaler  Outside Facility (Specify) Yes Yes   Sig: Inhale   furosemide (LASIX) 20 mg tablet  Outside Facility (Specify) Yes Yes   Sig: Take 20 mg by mouth 2 (two) times a day   hydrocortisone (ANUSOL-HC, PROCTOSOL HC,) 2 5 % rectal cream  Outside Facility (Specify) No Yes   Sig: Insert into the rectum 2 (two) times a day   hydrocortisone-pramoxine (PROCTOFOAM-HC) rectal foam  Outside Facility (Specify) No Yes   Sig: Insert 1 applicator into the rectum 2 (two) times a day   losartan-hydrochlorothiazide (HYZAAR) 100-12 5 MG per tablet  Outside Facility (Specify) Yes Yes   Sig: Take 1 tablet by mouth daily   menthol-zinc oxide (CALMOSEPTINE) 0 44-20 6 % OINT  Outside Facility (Specify) No Yes   Sig: Apply topically 2 (two) times a day   metoprolol tartrate (LOPRESSOR) 50 mg tablet  Outside Facility (Specify) No Yes   Sig: Take 1 tablet (50 mg total) by mouth every 12 (twelve) hours   oxybutynin (DITROPAN) 5 mg tablet  Outside Facility (Specify) Yes Yes   Sig: Take 5 mg by mouth daily     pantoprazole (PROTONIX) 40 mg tablet  Outside Facility (Specify) No Yes   Sig: Take 1 tablet (40 mg total) by mouth 2 (two) times a day before meals   senna-docusate sodium (SENOKOT S) 8 6-50 mg per tablet  Outside Facility (Specify) No Yes   Sig: Take 1 tablet by mouth 2 (two) times a day   valsartan (DIOVAN) 160 mg tablet  Outside Facility (Specify) No Yes   Sig: Take 1 tablet (160 mg total) by mouth 2 (two) times a day   zinc sulfate (ZINCATE) 220 mg capsule  Outside Facility (Specify) Yes Yes   Sig: Take 220 mg by mouth daily      Facility-Administered Medications: None       Past Medical History:   Diagnosis Date    Acute respiratory failure with hypoxia and hypercapnia ([address] )     Arthritis     Asthma     Hypertension     Pressure ulcer of sacral region, stage 2 7/11/2018    Stroke [facility])     residual RLE weakness    Uncontrolled stage 2 hypertension 7/15/2018       Past Surgical History:   Procedure Laterality Date    CLOSED REDUCTION HUMERUS FRACTURE Right 11/29/2016    Procedure: CLOSED REDUCTION ARM/HUMERUS  CLOSED VS  OPEN SHOULDER REDUCTION;  Surgeon: Jc Corado MD;  [facility]: [facility];  Service:     CLOSED REDUCTION HUMERUS FRACTURE Right 4/13/2018    Procedure: CLOSED REDUCTION ARM/HUMERUS;  Surgeon: Jc Corado MD;  [facility]: [facility];  Service: Orthopedics    EYE SURGERY      bilateral CAT EXT W/IOLs    INCISION AND DRAINAGE OF WOUND Right 7/12/2018    Procedure: INCISION AND DRAINAGE (I&D) TRUNK;  Surgeon: Shira Torres MD;  Location: [facility];  Service: General    RECTAL PROLAPSE REPAIR      done 5-6 yrs ago       Family History   Problem Relation Age of Onset    Hypertension Son      I have reviewed and agree with the history as documented  Social History   Substance Use Topics    Smoking status: Never Smoker    Smokeless tobacco: Never Used    Alcohol use No        Review of Systems   Constitutional: Negative  Negative for activity change, appetite change and chills  HENT: Negative for congestion, dental problem and drooling  Eyes: Negative for pain, discharge and itching  Respiratory: Negative for apnea, choking and chest tightness  Cardiovascular: Negative for chest pain and leg swelling  Gastrointestinal: Positive for hematochezia  Negative for abdominal distention, abdominal pain and anal bleeding  Endocrine: Negative for cold intolerance, heat intolerance and polydipsia  Genitourinary: Negative for difficulty urinating, dyspareunia, dysuria and enuresis  Musculoskeletal: Negative for arthralgias, back pain and gait problem  Skin: Negative for color change and pallor     Allergic/Immunologic: Negative for environmental allergies, food allergies and immunocompromised state  Neurological: Negative for dizziness, facial asymmetry and headaches  Hematological: Negative for adenopathy  Psychiatric/Behavioral: Negative for agitation, behavioral problems and confusion  All other systems reviewed and are negative  Physical Exam  Physical Exam   Constitutional: She appears well-developed and well-nourished  HENT:   Head: Normocephalic  Right Ear: External ear normal    Left Ear: External ear normal    Eyes: Pupils are equal, round, and reactive to light  Right eye exhibits no discharge  Left eye exhibits no discharge  Neck: Normal range of motion  No JVD present  No tracheal deviation present  No thyromegaly present  Cardiovascular: Normal rate, regular rhythm and normal heart sounds  Pulmonary/Chest: Effort normal  No respiratory distress  She has no wheezes  She has no rales  Abdominal: Soft  Bowel sounds are normal  She exhibits no distension and no mass  There is no tenderness  There is no rebound and no guarding  Genitourinary: Rectal exam shows guaiac negative stool  Genitourinary Comments: No rectal prolapse of present   Musculoskeletal: Normal range of motion  She exhibits no edema or deformity  Neurological: She is alert  She displays normal reflexes  No cranial nerve deficit  She exhibits normal muscle tone  Coordination normal    Skin: Skin is warm  Capillary refill takes less than 2 seconds  No rash noted  No erythema  Psychiatric: She has a normal mood and affect  Her behavior is normal  Thought content normal    Vitals reviewed        Vital Signs  ED Triage Vitals [08/17/18 0531]   Temperature Pulse Respirations Blood Pressure SpO2   97 6 °F (36 4 °C) 75 18 155/70 98 %      Temp Source Heart Rate Source Patient Position - Orthostatic VS BP Location FiO2 (%)   Temporal Monitor Lying Left arm --      Pain Score       No Pain           Vitals:    08/17/18 0531   BP: 155/70 Pulse: 75   Patient Position - Orthostatic VS: Lying       Visual Acuity      ED Medications  Medications - No data to display    Diagnostic Studies  Results Reviewed     Procedure Component Value Units Date/Time    POCT Chem 8+ [[id number]]  (Abnormal) Collected:  08/17/18 0636    Lab Status:  Final result Updated:  08/17/18 0640     SODIUM, I-STAT 140 mmol/l      Potassium, i-STAT 4 3 mmol/L      Chloride, istat 106 mmol/L      CO2, i-STAT 26 mmol/L      Anion Gap, Istat 13 mmol/L      Calcium, Ionized i-STAT 1 21 mmol/L      BUN, I-STAT 38 (H) mg/dl      Creatinine, i-STAT 1 4 (H) mg/dl      eGFR 35 ml/min/1 73sq m      Glucose, i-STAT 123 mg/dl      Hct, i-STAT 28 (L) %      Hgb, i-STAT 9 5 (L) g/dl      Specimen Type VENOUS    Comprehensive metabolic panel [[id number]]  (Abnormal) Collected:  08/17/18 0536    Lab Status:  Final result Specimen:  Blood from Arm, Right Updated:  08/17/18 2561     Sodium 135 (L) mmol/L      Potassium 6 1 (H) mmol/L      Chloride 105 mmol/L      CO2 23 mmol/L      Anion Gap 7 mmol/L      BUN 41 (H) mg/dL      Creatinine 1 27 mg/dL      Glucose 127 mg/dL      Calcium 8 7 mg/dL      AST 43 U/L      ALT 13 U/L      Alkaline Phosphatase 75 U/L      Total Protein 6 5 g/dL      Albumin 2 7 (L) g/dL      Total Bilirubin 0 40 mg/dL      eGFR 40 ml/min/1 73sq m     Narrative:         National Kidney Disease Education Program recommendations are as follows:  GFR calculation is accurate only with a steady state creatinine  Chronic Kidney disease less than 60 ml/min/1 73 sq  meters  Kidney failure less than 15 ml/min/1 73 sq  meters      CBC and differential [[id number]]  (Abnormal) Collected:  08/17/18 0536    Lab Status:  Final result Specimen:  Blood from Arm, Right Updated:  08/17/18 0547     WBC 5 02 Thousand/uL      RBC 3 03 (L) Million/uL      Hemoglobin 9 0 (L) g/dL      Hematocrit 29 6 (L) %      MCV 98 fL      MCH 29 7 pg      MCHC 30 4 (L) g/dL      RDW 14 6 %      MPV 10 6 fL Platelets 004 (L) Thousands/uL      nRBC 0 /100 WBCs      Neutrophils Relative 52 %      Immat GRANS % 0 %      Lymphocytes Relative 38 %      Monocytes Relative 7 %      Eosinophils Relative 2 %      Basophils Relative 1 %      Neutrophils Absolute 2 66 Thousands/µL      Immature Grans Absolute 0 01 Thousand/uL      Lymphocytes Absolute 1 88 Thousands/µL      Monocytes Absolute 0 36 Thousand/µL      Eosinophils Absolute 0 08 Thousand/µL      Basophils Absolute 0 03 Thousands/µL                  No orders to display              Procedures  Procedures       Phone Contacts  ED Phone Contact    ED Course                               MDM  Number of Diagnoses or Management Options  Rectal prolapse:   Diagnosis management comments: 82-year-old female who refuses evaluation by colorectal surgery for repair of a rectal prolapse  Presents with no active bleeding from her rectum at this time  She also has had spontaneous reduction of a rectal prolapse  I will check labs at this time  She has no abdominal pain whatsoever  0600  I spoke with patient's son Brandan Treviño and made him aware of the fact that mother currently did not have rectal prolapse nor did she have any bleeding  He states that he did make a follow-up appoint with Colorectal surgery  Patient is asymptomatic and very much wants to go back to the nursing home where he will pick her up later today  Patient's i-STAT potassium was 4 3       Amount and/or Complexity of Data Reviewed  Clinical lab tests: ordered and reviewed      CritCare Time    Disposition  Final diagnoses:   Rectal prolapse     Time reflects when diagnosis was documented in both MDM as applicable and the Disposition within this note     Time User Action Codes Description Comment    8/17/2018  6:06 AM Grant Alfonso Add [K62 3] Rectal prolapse       ED Disposition     ED Disposition Condition Comment    Discharge  Anuj Jewell discharge to home/self care      Condition at discharge: Good Follow-up Information    None         Patient's Medications   Discharge Prescriptions    No medications on file     No discharge procedures on file      ED Provider  Electronically Signed by           Monik Camarena DO  08/17/18 5638

## 2018-08-17 NOTE — DISCHARGE INSTRUCTIONS
Rectal Prolapse   WHAT YOU NEED TO KNOW:   A rectal prolapse is a condition that causes your rectum to come through your anus  The rectum is the end of your bowel  A prolapse may happen during a bowel movement  A prolapse may happen more often in women after childbirth or who are older than 50 years  DISCHARGE INSTRUCTIONS:   Call 911 for any of the following:   · You have trouble breathing  · Your heart is beating faster than usual   Seek care immediately if:   · You have severe pain in your abdomen  · Your abdomen looks bigger than usual      · Blood from your rectum soaks through your underwear  Contact your healthcare provider if:   · You have a fever  · You have nausea or are vomiting  · You see larger amounts of blood in your bowel movement than before  · You have questions or concerns about your condition or care  Medicines:   · Stool softeners  help prevent constipation  · Laxatives  help your intestines relax and loosen to prevent constipation  · Take your medicine as directed  Contact your healthcare provider if you think your medicine is not helping or if you have side effects  Tell him or her if you are allergic to any medicine  Keep a list of the medicines, vitamins, and herbs you take  Include the amounts, and when and why you take them  Bring the list or the pill bottles to follow-up visits  Carry your medicine list with you in case of an emergency  Follow up with your healthcare provider as directed:  Write down your questions so you remember to ask them during your visits  Follow up with your healthcare provider as directed:  Write down your questions so can you remember to ask them during your visits  How to do a manual reduction:  Manual reduction is a procedure you can do to place your rectum back inside of the anus  Your healthcare provider will show you how to do a manual reduction  You may need a family member to help you with manual reduction   The following are general steps to follow  Your healthcare provider may give you specific steps to follow  · Your healthcare provider may tell you to apply sugar to your rectum before manual reduction  This may help decrease the swelling of your rectum, and make it easier to put back inside your anus  Ask your healthcare provider about applying sugar to your rectum  · Lie on your back with your knees bent  · Wash your hands and put on gloves  Lubricate your glove with petroleum jelly  · Hold your rectum on both sides of the anus  Gently apply firm, steady pressure on your rectum and push it into your anus  You may need to apply pressure for several minutes if the bowel is swollen  Inspect your anus  You can use a mirror or have your family member inspect your anus  You should not see the rectum  If a prolapse happens again, you can repeat manual reduction  · You can hold the rectum in place with gauze and tape across your buttocks  Before you apply gauze, place a quarter size amount of petroleum jelly on the gauze  The petroleum jelly will prevent the gauze from sticking to your rectum  Remove the gauze as directed by your healthcare provider  Prevent a rectal prolapse:   · Eat more high-fiber foods  This may help decrease constipation by adding bulk and softness to your bowel movements  Your healthcare provider can help you create a meal plan that includes high-fiber foods  High fiber foods include fruit, vegetables, whole-grain breads, and cooked beans  · Increase the amount of liquid you drink  Liquids can help keep your bowel movements soft and prevent constipation  Ask your healthcare provider how much liquid to drink each day  · Exercise your pelvic muscles  Kegel exercises strengthen the pelvic muscles  These exercises involve tightening and relaxing vaginal and rectal muscles  Kegel exercises can make the rectal muscles stronger and improve bowel control   Ask your healthcare provider for more information on how to do kegel exercises  · Do not sit for long amounts of time  You may put too much pressure on your anus  Pressure on your anus may cause a rectal prolapse  © 2017 [address]  Information is for End User's use only and may not be sold, redistributed or otherwise used for commercial purposes  All illustrations and images included in CareNotes® are the copyrighted property of [employer]  or [facility]  The above information is an  only  It is not intended as medical advice for individual conditions or treatments  Talk to your doctor, nurse or pharmacist before following any medical regimen to see if it is safe and effective for you

## 2018-09-24 NOTE — PROGRESS NOTES
Aimee Shore was seen today for rectal prolapse  Diagnoses and all orders for this visit:    Rectal prolapse       Rectal prolapse  Patient presents for evaluation of rectal prolapse  She has a documented history of full thickness rectal prolapse and rectal bleeding  This is reproducible on today's exam   We discussed care of this with colonoscopy to ensure no other lesions that could lead to bleeding  Risks and benefits of colonoscopy reviewed with patient to include, but not be limited to: anesthesia, bleeding, missed lesion and perforation requiring surgery  After this I would recommend surgical correction of her rectal prolapse  We discussed abdominal and perineal procedures and the advantage/ disadvantage to each  Perineal proctectomy would be recommended for her given medical comorbidities  Risks of prolapse surgery, including but not limited to pain, bleeding, failure to heal properly, fecal incontinence, persistent or recurrent anal disease, infection, fistula, abscess were reviewed  Questions were answered  Discussion was had with her and with her son present  I think this would greatly improve her quality of life given her frequent complaints  After discussing all of this she does not wish for surgery or colonoscopy at this time  I encouraged her to consider this and she will call with questions or if she decides to go forward with colonoscopy anf prolapse repair  DAVID Jamison is here today for consult and evaluation of rectal prolapse  She was referred by Dr Hoa Wilkerson  She complains of anal discomfort and occasional bright red blood seen upon wiping  She states symptoms started about 1 year ago  She has a bowel movement everyday or overy other day with soft formed stool  She takes Senokot daily  She has never had a colonoscopy      Past Medical History:   Diagnosis Date    Acute respiratory failure with hypoxia and hypercapnia (HCC)     Arthritis     Asthma     Hypertension     Pressure ulcer of sacral region, stage 2 7/11/2018    Stroke [facility])     residual RLE weakness    Uncontrolled stage 2 hypertension 7/15/2018     Past Surgical History:   Procedure Laterality Date    CLOSED REDUCTION HUMERUS FRACTURE Right 11/29/2016    Procedure: CLOSED REDUCTION ARM/HUMERUS  CLOSED VS  OPEN SHOULDER REDUCTION;  Surgeon: Levi Schafer MD;  [facility];  Service:     CLOSED REDUCTION HUMERUS FRACTURE Right 4/13/2018    Procedure: CLOSED REDUCTION ARM/HUMERUS;  Surgeon: Levi Schafer MD;  [facility];  Service: Orthopedics    EYE SURGERY      bilateral CAT EXT W/IOLs    INCISION AND DRAINAGE OF WOUND Right 7/12/2018    Procedure: INCISION AND DRAINAGE (I&D) TRUNK;  Surgeon: Anand Amaral MD;  [facility]: [facility];  Service: General    RECTAL PROLAPSE REPAIR      done 5-6 yrs ago       Current Outpatient Prescriptions:     albuterol (PROVENTIL HFA,VENTOLIN HFA) 90 mcg/act inhaler, Inhale 2 puffs every 4 (four) hours as needed for wheezing, Disp: 1 Inhaler, Rfl: 1    alendronate (FOSAMAX) 70 mg tablet, Take by mouth, Disp: , Rfl:     amLODIPine (NORVASC) 5 mg tablet, Take 1 tablet (5 mg total) by mouth daily, Disp: 30 tablet, Rfl: 0    ascorbic acid (VITAMIN C) 500 MG tablet, Take 1 tablet (500 mg total) by mouth daily, Disp: 30 tablet, Rfl: 0    atorvastatin (LIPITOR) 20 mg tablet, Take 1 tablet by mouth daily with dinner for 30 days, Disp: 30 tablet, Rfl: 0    atorvastatin (LIPITOR) 20 mg tablet, , Disp: , Rfl:     doxazosin (CARDURA) 4 mg tablet, Take 4 mg by mouth daily at bedtime, Disp: , Rfl:     ferrous sulfate 220 (44 Fe) mg/5 mL solution, Take 6 8 mL (300 mg total) by mouth daily, Disp: 150 mL, Rfl: 0    fluticasone-salmeterol (ADVAIR DISKUS) 100-50 mcg/dose inhaler, Inhale, Disp: , Rfl:     furosemide (LASIX) 20 mg tablet, Take 20 mg by mouth 2 (two) times a day, Disp: , Rfl:     hydrocortisone (ANUSOL-HC, PROCTOSOL HC,) 2 5 % rectal cream, Insert into the rectum 2 (two) times a day, Disp: 30 g, Rfl: 0    hydrocortisone-pramoxine (PROCTOFOAM-HC) rectal foam, Insert 1 applicator into the rectum 2 (two) times a day, Disp: 10 g, Rfl: 0    losartan-hydrochlorothiazide (HYZAAR) 100-12 5 MG per tablet, Take 1 tablet by mouth daily, Disp: , Rfl:     menthol-zinc oxide (CALMOSEPTINE) 0 44-20 6 % OINT, Apply topically 2 (two) times a day, Disp: 71 g, Rfl: 0    metoprolol tartrate (LOPRESSOR) 50 mg tablet, Take 1 tablet (50 mg total) by mouth every 12 (twelve) hours, Disp: , Rfl: 0    Multiple Vitamin (MULTIVITAMIN) capsule, Take 1 capsule by mouth daily, Disp: , Rfl:     oxybutynin (DITROPAN) 5 mg tablet, Take 5 mg by mouth daily  , Disp: , Rfl:     pantoprazole (PROTONIX) 40 mg tablet, Take 1 tablet (40 mg total) by mouth 2 (two) times a day before meals, Disp: , Rfl: 0    senna-docusate sodium (SENOKOT S) 8 6-50 mg per tablet, Take 1 tablet by mouth 2 (two) times a day, Disp: 60 tablet, Rfl: 0    valsartan (DIOVAN) 160 mg tablet, Take 1 tablet (160 mg total) by mouth 2 (two) times a day, Disp: 60 tablet, Rfl: 0    zinc sulfate (ZINCATE) 220 mg capsule, Take 220 mg by mouth daily, Disp: , Rfl:   Allergies as of 09/24/2018    (No Known Allergies)     Review of Systems   Gastrointestinal: Positive for blood in stool and rectal pain  All other systems reviewed and are negative  There were no vitals filed for this visit  Physical Exam   Constitutional: She appears well-developed  HENT:   Head: Normocephalic and atraumatic  Cardiovascular: Normal rate and regular rhythm  Pulmonary/Chest: Effort normal and breath sounds normal    Abdominal: Soft  There is no tenderness     Genitourinary:   Genitourinary Comments: Full thickness rectal prolapse demonstrated   Lower Endoscopy  Date/Time: 9/24/2018 11:56 AM  Performed by: Corinne Mathur  Authorized by: Corinne Mathur     Verbal consent obtained?: Yes    Consent given by:  Patient  Indications: rectal bleeding    Patient sedated: No    Scope type:   Anoscope  External exam performed: Yes    Perianal erythema: No    External hemorrhoids: No    Digital exam performed: Yes    Laxity of anal sphincter: Yes    Intraluminal mass: No    Anal stricture: No    Procedure termination:  Procedure complete  Patient tolerance:  Patient tolerated the procedure well with no immediate complications   Rectal prolapse reproduced with straining and spontaneously reduced

## 2018-09-24 NOTE — LETTER
September 24, 2018     Edmundo Higgins DO  [address] [facility] [address]    Patient: Delmi Jaquez   YOB: 1935   Date of Visit: 9/24/2018       Dear Dr Theresa Ramirez: Thank you for referring Delmi Jaquez to me for evaluation  Below are my notes for this consultation  If you have questions, please do not hesitate to call me  I look forward to following your patient along with you  Sincerely,        Alma Casanova MD        CC: No Recipients  Alma Casanova MD  9/24/2018 11:57 AM  Sign at close encounter  Jorge Patel was seen today for rectal prolapse  Diagnoses and all orders for this visit:    Rectal prolapse       Rectal prolapse  Patient presents for evaluation of rectal prolapse  She has a documented history of full thickness rectal prolapse and rectal bleeding  This is reproducible on today's exam   We discussed care of this with colonoscopy to ensure no other lesions that could lead to bleeding  Risks and benefits of colonoscopy reviewed with patient to include, but not be limited to: anesthesia, bleeding, missed lesion and perforation requiring surgery  After this I would recommend surgical correction of her rectal prolapse  We discussed abdominal and perineal procedures and the advantage/ disadvantage to each  Perineal proctectomy would be recommended for her given medical comorbidities  Risks of prolapse surgery, including but not limited to pain, bleeding, failure to heal properly, fecal incontinence, persistent or recurrent anal disease, infection, fistula, abscess were reviewed  Questions were answered  Discussion was had with her and with her son present  I think this would greatly improve her quality of life given her frequent complaints  After discussing all of this she does not wish for surgery or colonoscopy at this time    I encouraged her to consider this and she will call with questions or if she decides to go forward with colonoscopy anf prolapse repair  HPI Keerthi Richards is here today for consult and evaluation of rectal prolapse  She was referred by Dr Meagan Leone  She complains of anal discomfort and occasional bright red blood seen upon wiping  She states symptoms started about 1 year ago  She has a bowel movement everyday or overy other day with soft formed stool  She takes Senokot daily  She has never had a colonoscopy      Past Medical History:   Diagnosis Date    Acute respiratory failure with hypoxia and hypercapnia (HCC)     Arthritis     Asthma     Hypertension     Pressure ulcer of sacral region, stage 2 7/11/2018    Stroke [facility])     residual RLE weakness    Uncontrolled stage 2 hypertension 7/15/2018     Past Surgical History:   Procedure Laterality Date    CLOSED REDUCTION HUMERUS FRACTURE Right 11/29/2016    Procedure: CLOSED REDUCTION ARM/HUMERUS  CLOSED VS  OPEN SHOULDER REDUCTION;  Surgeon: Marisol Church MD;  Location: [facility];  Service:     CLOSED REDUCTION HUMERUS FRACTURE Right 4/13/2018    Procedure: CLOSED REDUCTION ARM/HUMERUS;  Surgeon: Marisol Church MD;  Location: [facility];  Service: Orthopedics    EYE SURGERY      bilateral CAT EXT W/IOLs    INCISION AND DRAINAGE OF WOUND Right 7/12/2018    Procedure: INCISION AND DRAINAGE (I&D) TRUNK;  Surgeon: Chip Gomez MD;  Location: [facility];  Service: General    RECTAL PROLAPSE REPAIR      done 5-6 yrs ago       Current Outpatient Prescriptions:     albuterol (PROVENTIL HFA,VENTOLIN HFA) 90 mcg/act inhaler, Inhale 2 puffs every 4 (four) hours as needed for wheezing, Disp: 1 Inhaler, Rfl: 1    alendronate (FOSAMAX) 70 mg tablet, Take by mouth, Disp: , Rfl:     amLODIPine (NORVASC) 5 mg tablet, Take 1 tablet (5 mg total) by mouth daily, Disp: 30 tablet, Rfl: 0    ascorbic acid (VITAMIN C) 500 MG tablet, Take 1 tablet (500 mg total) by mouth daily, Disp: 30 tablet, Rfl: 0    atorvastatin (LIPITOR) 20 mg tablet, Take 1 tablet by mouth daily with dinner for 30 days, Disp: 30 tablet, Rfl: 0    atorvastatin (LIPITOR) 20 mg tablet, , Disp: , Rfl:     doxazosin (CARDURA) 4 mg tablet, Take 4 mg by mouth daily at bedtime, Disp: , Rfl:     ferrous sulfate 220 (44 Fe) mg/5 mL solution, Take 6 8 mL (300 mg total) by mouth daily, Disp: 150 mL, Rfl: 0    fluticasone-salmeterol (ADVAIR DISKUS) 100-50 mcg/dose inhaler, Inhale, Disp: , Rfl:     furosemide (LASIX) 20 mg tablet, Take 20 mg by mouth 2 (two) times a day, Disp: , Rfl:     hydrocortisone (ANUSOL-HC, PROCTOSOL HC,) 2 5 % rectal cream, Insert into the rectum 2 (two) times a day, Disp: 30 g, Rfl: 0    hydrocortisone-pramoxine (PROCTOFOAM-HC) rectal foam, Insert 1 applicator into the rectum 2 (two) times a day, Disp: 10 g, Rfl: 0    losartan-hydrochlorothiazide (HYZAAR) 100-12 5 MG per tablet, Take 1 tablet by mouth daily, Disp: , Rfl:     menthol-zinc oxide (CALMOSEPTINE) 0 44-20 6 % OINT, Apply topically 2 (two) times a day, Disp: 71 g, Rfl: 0    metoprolol tartrate (LOPRESSOR) 50 mg tablet, Take 1 tablet (50 mg total) by mouth every 12 (twelve) hours, Disp: , Rfl: 0    Multiple Vitamin (MULTIVITAMIN) capsule, Take 1 capsule by mouth daily, Disp: , Rfl:     oxybutynin (DITROPAN) 5 mg tablet, Take 5 mg by mouth daily  , Disp: , Rfl:     pantoprazole (PROTONIX) 40 mg tablet, Take 1 tablet (40 mg total) by mouth 2 (two) times a day before meals, Disp: , Rfl: 0    senna-docusate sodium (SENOKOT S) 8 6-50 mg per tablet, Take 1 tablet by mouth 2 (two) times a day, Disp: 60 tablet, Rfl: 0    valsartan (DIOVAN) 160 mg tablet, Take 1 tablet (160 mg total) by mouth 2 (two) times a day, Disp: 60 tablet, Rfl: 0    zinc sulfate (ZINCATE) 220 mg capsule, Take 220 mg by mouth daily, Disp: , Rfl:   Allergies as of 09/24/2018    (No Known Allergies)     Review of Systems   Gastrointestinal: Positive for blood in stool and rectal pain  All other systems reviewed and are negative      There were no vitals filed for this visit   Physical Exam   Constitutional: She appears well-developed  HENT:   Head: Normocephalic and atraumatic  Cardiovascular: Normal rate and regular rhythm  Pulmonary/Chest: Effort normal and breath sounds normal    Abdominal: Soft  There is no tenderness  Genitourinary:   Genitourinary Comments: Full thickness rectal prolapse demonstrated   Lower Endoscopy  Date/Time: 9/24/2018 11:56 AM  Performed by: Noah Palomino  Authorized by: Noah Palomino     Verbal consent obtained?: Yes    Consent given by:  Patient  Indications: rectal bleeding    Patient sedated: No    Scope type:   Anoscope  External exam performed: Yes    Perianal erythema: No    External hemorrhoids: No    Digital exam performed: Yes    Laxity of anal sphincter: Yes    Intraluminal mass: No    Anal stricture: No    Procedure termination:  Procedure complete  Patient tolerance:  Patient tolerated the procedure well with no immediate complications   Rectal prolapse reproduced with straining and spontaneously reduced

## 2018-09-24 NOTE — ASSESSMENT & PLAN NOTE
Patient presents for evaluation of rectal prolapse  She has a documented history of full thickness rectal prolapse and rectal bleeding  This is reproducible on today's exam   We discussed care of this with colonoscopy to ensure no other lesions that could lead to bleeding  Risks and benefits of colonoscopy reviewed with patient to include, but not be limited to: anesthesia, bleeding, missed lesion and perforation requiring surgery  After this I would recommend surgical correction of her rectal prolapse  We discussed abdominal and perineal procedures and the advantage/ disadvantage to each  Perineal proctectomy would be recommended for her given medical comorbidities  Risks of prolapse surgery, including but not limited to pain, bleeding, failure to heal properly, fecal incontinence, persistent or recurrent anal disease, infection, fistula, abscess were reviewed  Questions were answered  Discussion was had with her and with her son present  I think this would greatly improve her quality of life given her frequent complaints  After discussing all of this she does not wish for surgery or colonoscopy at this time  I encouraged her to consider this and she will call with questions or if she decides to go forward with colonoscopy anf prolapse repair

## 2018-10-30 NOTE — PROGRESS NOTES
Assessment/Plan:    Abscess of axilla, right  A 5 centimeter recurrent fluid collection of the right axilla status post incision and drainage in the recent ER  Noted to have significant amount of fibrin is exudative material   Underwent selective debridement my office without complication  -patient should continue with wound care follow-up either in her current facility or she may be seen at [facility] wound care 's  She will need additional selective debridement in addition to drainage control  Diagnoses and all orders for this visit:    Abscess of axilla, right          Subjective:      Patient ID: Mannie Martell is a 82 y o  female  53-year-old female with a history of right axilla abscess status post incision and drainage by my partner Dr Shamir Castillo few weeks prior who was recently seen in my office last week with noted draining fluid collection the right axilla  Ultrasound was ordered to rule out any additional collection versus abscess  Ultrasound at an outside facility showed fluid collection potential abscess never the patient was sent to the ER  At the ER she underwent further incision and drainage which as per note showed it serous fluid and a lot of exudative material as well  The following portions of the patient's history were reviewed and updated as appropriate:   She  has a past medical history of Acute respiratory failure with hypoxia and hypercapnia ([address] ); Arthritis; Asthma; Hypertension; Pressure ulcer of sacral region, stage 2 (7/11/2018); Stroke [facility]); and Uncontrolled stage 2 hypertension (7/15/2018)    She   Patient Active Problem List    Diagnosis Date Noted    Essential hypertension 07/15/2018    Abscess of axilla, right 07/12/2018    Pressure ulcer of sacral region, stage 2 07/11/2018    Bacteremia due to methicillin susceptible Staphylococcus aureus (MSSA)     Generalized weakness 07/08/2018    Acute kidney injury ([address] ) 07/08/2018    Hyponatremia 07/08/2018  Partial small bowel obstruction (HCC) 07/08/2018    Hyperglycemia 07/08/2018    Leukocytosis 07/08/2018    Rectal prolapse 05/03/2018    Vaginal prolapse 04/13/2018    Elevated troponin 04/13/2018    Iron deficiency anemia 04/12/2018    Hematochezia 01/11/2017    Urinary retention 12/08/2016    Benign essential hypertension 12/01/2016    GI bleed 12/01/2016    Hyperlipidemia 12/01/2016    Atrial fibrillation ([facility] ) 12/01/2016    Anemia 12/01/2016    Dislocation of shoulder region, right, initial encounter 11/29/2016    Acute respiratory failure with hypoxia and hypercapnia ([facility] ) 11/29/2016     She  has a past surgical history that includes Eye surgery; Rectal prolapse repair; Closed reduction humerus fracture (Right, 11/29/2016); Closed reduction humerus fracture (Right, 4/13/2018); and Incision and drainage of wound (Right, 7/12/2018)  Her family history includes Hypertension in her son  She  reports that she has never smoked  She has never used smokeless tobacco  She reports that she does not drink alcohol or use drugs    Current Outpatient Prescriptions   Medication Sig Dispense Refill    albuterol (PROVENTIL HFA,VENTOLIN HFA) 90 mcg/act inhaler Inhale 2 puffs every 4 (four) hours as needed for wheezing 1 Inhaler 1    alendronate (FOSAMAX) 70 mg tablet Take by mouth      amLODIPine (NORVASC) 5 mg tablet Take 1 tablet (5 mg total) by mouth daily 30 tablet 0    ascorbic acid (VITAMIN C) 500 MG tablet Take 1 tablet (500 mg total) by mouth daily 30 tablet 0    doxazosin (CARDURA) 4 mg tablet Take 4 mg by mouth daily at bedtime      ferrous sulfate 220 (44 Fe) mg/5 mL solution Take 6 8 mL (300 mg total) by mouth daily 150 mL 0    fluticasone-salmeterol (ADVAIR DISKUS) 100-50 mcg/dose inhaler Inhale      furosemide (LASIX) 20 mg tablet Take 20 mg by mouth 2 (two) times a day      hydrocortisone (ANUSOL-HC, PROCTOSOL HC,) 2 5 % rectal cream Insert into the rectum 2 (two) times a day 30 g 0  hydrocortisone-pramoxine (PROCTOFOAM-HC) rectal foam Insert 1 applicator into the rectum 2 (two) times a day 10 g 0    losartan-hydrochlorothiazide (HYZAAR) 100-12 5 MG per tablet Take 1 tablet by mouth daily      menthol-zinc oxide (CALMOSEPTINE) 0 44-20 6 % OINT Apply topically 2 (two) times a day 71 g 0    metoprolol tartrate (LOPRESSOR) 50 mg tablet Take 1 tablet (50 mg total) by mouth every 12 (twelve) hours  0    Multiple Vitamin (MULTIVITAMIN) capsule Take 1 capsule by mouth daily      oxybutynin (DITROPAN) 5 mg tablet Take 5 mg by mouth daily        pantoprazole (PROTONIX) 40 mg tablet Take 1 tablet (40 mg total) by mouth 2 (two) times a day before meals  0    predniSONE 10 mg tablet Take 4 tablets (40 mg total) by mouth daily for 4 days 16 tablet 0    senna-docusate sodium (SENOKOT S) 8 6-50 mg per tablet Take 1 tablet by mouth 2 (two) times a day 60 tablet 0    valsartan (DIOVAN) 160 mg tablet Take 1 tablet (160 mg total) by mouth 2 (two) times a day 60 tablet 0    zinc sulfate (ZINCATE) 220 mg capsule Take 220 mg by mouth daily      atorvastatin (LIPITOR) 20 mg tablet Take 1 tablet by mouth daily with dinner for 30 days 30 tablet 0     No current facility-administered medications for this visit        Current Outpatient Prescriptions on File Prior to Visit   Medication Sig    albuterol (PROVENTIL HFA,VENTOLIN HFA) 90 mcg/act inhaler Inhale 2 puffs every 4 (four) hours as needed for wheezing    alendronate (FOSAMAX) 70 mg tablet Take by mouth    amLODIPine (NORVASC) 5 mg tablet Take 1 tablet (5 mg total) by mouth daily    ascorbic acid (VITAMIN C) 500 MG tablet Take 1 tablet (500 mg total) by mouth daily    doxazosin (CARDURA) 4 mg tablet Take 4 mg by mouth daily at bedtime    ferrous sulfate 220 (44 Fe) mg/5 mL solution Take 6 8 mL (300 mg total) by mouth daily    fluticasone-salmeterol (ADVAIR DISKUS) 100-50 mcg/dose inhaler Inhale    furosemide (LASIX) 20 mg tablet Take 20 mg by mouth 2 (two) times a day    hydrocortisone (ANUSOL-HC, PROCTOSOL HC,) 2 5 % rectal cream Insert into the rectum 2 (two) times a day    hydrocortisone-pramoxine (PROCTOFOAM-HC) rectal foam Insert 1 applicator into the rectum 2 (two) times a day    losartan-hydrochlorothiazide (HYZAAR) 100-12 5 MG per tablet Take 1 tablet by mouth daily    menthol-zinc oxide (CALMOSEPTINE) 0 44-20 6 % OINT Apply topically 2 (two) times a day    metoprolol tartrate (LOPRESSOR) 50 mg tablet Take 1 tablet (50 mg total) by mouth every 12 (twelve) hours    Multiple Vitamin (MULTIVITAMIN) capsule Take 1 capsule by mouth daily    oxybutynin (DITROPAN) 5 mg tablet Take 5 mg by mouth daily      pantoprazole (PROTONIX) 40 mg tablet Take 1 tablet (40 mg total) by mouth 2 (two) times a day before meals    predniSONE 10 mg tablet Take 4 tablets (40 mg total) by mouth daily for 4 days    senna-docusate sodium (SENOKOT S) 8 6-50 mg per tablet Take 1 tablet by mouth 2 (two) times a day    valsartan (DIOVAN) 160 mg tablet Take 1 tablet (160 mg total) by mouth 2 (two) times a day    zinc sulfate (ZINCATE) 220 mg capsule Take 220 mg by mouth daily    atorvastatin (LIPITOR) 20 mg tablet Take 1 tablet by mouth daily with dinner for 30 days     No current facility-administered medications on file prior to visit  She has No Known Allergies       Review of Systems   Constitutional: Negative  Skin: Positive for wound  Negative for color change, pallor and rash  Objective:      /56   Pulse 70   Temp 98 1 °F (36 7 °C)            Physical Exam   Constitutional: She appears well-developed and well-nourished  No distress  Skin: Skin is warm  No rash noted  She is not diaphoretic  There is erythema  No pallor  5 centimeter wound of the right axilla with serous drainage and significant amount of fibrin is exudative material   Minimal erythema  Mild tenderness         Procedures     Patient with 5 centimeter chronic wound of the right upper extremity status post incision and drainage  On exam there is significant amount of fibrinous exudative material noted at the base of the wound  A verbal consent was obtained via patient for selective debridement of this exudate material     Using a scissor and forceps the wound was probed and significant amount of exudative material was noted  This exudate material was sharply  debrided using scissors  No bleeding noted  Patient tolerated procedure well  The wound was then covered with a dry dressing  see L&D note

## 2019-01-01 ENCOUNTER — APPOINTMENT (INPATIENT)
Dept: RADIOLOGY | Facility: HOSPITAL | Age: 84
DRG: 208 | End: 2019-01-01
Payer: MEDICARE

## 2019-01-01 ENCOUNTER — APPOINTMENT (EMERGENCY)
Dept: RADIOLOGY | Facility: HOSPITAL | Age: 84
End: 2019-01-01
Payer: MEDICARE

## 2019-01-01 ENCOUNTER — HOSPITAL ENCOUNTER (INPATIENT)
Facility: HOSPITAL | Age: 84
LOS: 1 days | DRG: 208 | End: 2019-06-19
Attending: EMERGENCY MEDICINE | Admitting: EMERGENCY MEDICINE
Payer: MEDICARE

## 2019-01-01 ENCOUNTER — APPOINTMENT (EMERGENCY)
Dept: CT IMAGING | Facility: HOSPITAL | Age: 84
End: 2019-01-01
Payer: MEDICARE

## 2019-01-01 ENCOUNTER — HOSPITAL ENCOUNTER (EMERGENCY)
Facility: HOSPITAL | Age: 84
End: 2019-06-18
Attending: EMERGENCY MEDICINE
Payer: MEDICARE

## 2019-01-01 ENCOUNTER — APPOINTMENT (INPATIENT)
Dept: NON INVASIVE DIAGNOSTICS | Facility: HOSPITAL | Age: 84
DRG: 208 | End: 2019-01-01
Payer: MEDICARE

## 2019-01-01 ENCOUNTER — APPOINTMENT (INPATIENT)
Dept: NEUROLOGY | Facility: AMBULATORY SURGERY CENTER | Age: 84
DRG: 208 | End: 2019-01-01
Payer: MEDICARE

## 2019-01-01 VITALS
BODY MASS INDEX: 23.23 KG/M2 | OXYGEN SATURATION: 96 % | HEART RATE: 100 BPM | TEMPERATURE: 97.3 F | RESPIRATION RATE: 40 BRPM | WEIGHT: 103.62 LBS | DIASTOLIC BLOOD PRESSURE: 42 MMHG | SYSTOLIC BLOOD PRESSURE: 101 MMHG

## 2019-01-01 VITALS
RESPIRATION RATE: 18 BRPM | HEART RATE: 83 BPM | SYSTOLIC BLOOD PRESSURE: 135 MMHG | DIASTOLIC BLOOD PRESSURE: 59 MMHG | TEMPERATURE: 97.8 F | OXYGEN SATURATION: 99 %

## 2019-01-01 DIAGNOSIS — K92.2 GASTROINTESTINAL HEMORRHAGE, UNSPECIFIED GASTROINTESTINAL HEMORRHAGE TYPE: ICD-10-CM

## 2019-01-01 DIAGNOSIS — R53.1 GENERALIZED WEAKNESS: ICD-10-CM

## 2019-01-01 DIAGNOSIS — L89.152 PRESSURE ULCER OF SACRAL REGION, STAGE 2 (HCC): Chronic | ICD-10-CM

## 2019-01-01 DIAGNOSIS — E87.1 HYPONATREMIA: ICD-10-CM

## 2019-01-01 DIAGNOSIS — N81.10 VAGINAL PROLAPSE: ICD-10-CM

## 2019-01-01 DIAGNOSIS — B95.61 BACTEREMIA DUE TO METHICILLIN SUSCEPTIBLE STAPHYLOCOCCUS AUREUS (MSSA): ICD-10-CM

## 2019-01-01 DIAGNOSIS — E78.5 HYPERLIPIDEMIA: ICD-10-CM

## 2019-01-01 DIAGNOSIS — R78.81 BACTEREMIA DUE TO METHICILLIN SUSCEPTIBLE STAPHYLOCOCCUS AUREUS (MSSA): ICD-10-CM

## 2019-01-01 DIAGNOSIS — K56.600 PARTIAL SMALL BOWEL OBSTRUCTION (HCC): ICD-10-CM

## 2019-01-01 DIAGNOSIS — I46.9 CARDIAC ARREST (HCC): Primary | ICD-10-CM

## 2019-01-01 DIAGNOSIS — N17.9 ACUTE KIDNEY INJURY (HCC): ICD-10-CM

## 2019-01-01 DIAGNOSIS — L02.411 ABSCESS OF AXILLA, RIGHT: ICD-10-CM

## 2019-01-01 DIAGNOSIS — J96.01 ACUTE RESPIRATORY FAILURE WITH HYPOXIA AND HYPERCAPNIA (HCC): ICD-10-CM

## 2019-01-01 DIAGNOSIS — R73.9 HYPERGLYCEMIA: ICD-10-CM

## 2019-01-01 DIAGNOSIS — J69.0 ASPIRATION PNEUMONITIS (HCC): ICD-10-CM

## 2019-01-01 DIAGNOSIS — I48.91 ATRIAL FIBRILLATION (HCC): ICD-10-CM

## 2019-01-01 DIAGNOSIS — I10 ESSENTIAL HYPERTENSION: Chronic | ICD-10-CM

## 2019-01-01 DIAGNOSIS — J96.02 ACUTE RESPIRATORY FAILURE WITH HYPOXIA AND HYPERCAPNIA (HCC): ICD-10-CM

## 2019-01-01 DIAGNOSIS — R33.9 URINARY RETENTION: ICD-10-CM

## 2019-01-01 DIAGNOSIS — D50.0 IRON DEFICIENCY ANEMIA DUE TO CHRONIC BLOOD LOSS: ICD-10-CM

## 2019-01-01 DIAGNOSIS — I10 BENIGN ESSENTIAL HYPERTENSION: ICD-10-CM

## 2019-01-01 DIAGNOSIS — R77.8 ELEVATED TROPONIN: ICD-10-CM

## 2019-01-01 DIAGNOSIS — K92.1 HEMATOCHEZIA: ICD-10-CM

## 2019-01-01 DIAGNOSIS — K62.3 RECTAL PROLAPSE: ICD-10-CM

## 2019-01-01 DIAGNOSIS — S43.004A DISLOCATION OF SHOULDER REGION, RIGHT, INITIAL ENCOUNTER: ICD-10-CM

## 2019-01-01 DIAGNOSIS — N17.9 AKI (ACUTE KIDNEY INJURY) (HCC): ICD-10-CM

## 2019-01-01 DIAGNOSIS — D50.9 IRON DEFICIENCY ANEMIA, UNSPECIFIED IRON DEFICIENCY ANEMIA TYPE: ICD-10-CM

## 2019-01-01 DIAGNOSIS — D64.9 ANEMIA: ICD-10-CM

## 2019-01-01 DIAGNOSIS — D72.829 LEUKOCYTOSIS, UNSPECIFIED TYPE: ICD-10-CM

## 2019-01-01 LAB
ABO GROUP BLD BPU: NORMAL
ABO GROUP BLD: NORMAL
ABO GROUP BLD: NORMAL
ALBUMIN SERPL BCP-MCNC: 2.5 G/DL (ref 3.5–5)
ALBUMIN SERPL BCP-MCNC: 2.6 G/DL (ref 3.5–5)
ALBUMIN SERPL BCP-MCNC: 2.9 G/DL (ref 3.5–5)
ALP SERPL-CCNC: 120 U/L (ref 46–116)
ALP SERPL-CCNC: 125 U/L (ref 46–116)
ALP SERPL-CCNC: 174 U/L (ref 46–116)
ALT SERPL W P-5'-P-CCNC: 32 U/L (ref 12–78)
ALT SERPL W P-5'-P-CCNC: 38 U/L (ref 12–78)
ALT SERPL W P-5'-P-CCNC: 44 U/L (ref 12–78)
ANION GAP SERPL CALCULATED.3IONS-SCNC: 11 MMOL/L (ref 4–13)
ANION GAP SERPL CALCULATED.3IONS-SCNC: 12 MMOL/L (ref 4–13)
ANION GAP SERPL CALCULATED.3IONS-SCNC: 12 MMOL/L (ref 4–13)
ANION GAP SERPL CALCULATED.3IONS-SCNC: 13 MMOL/L (ref 4–13)
ANION GAP SERPL CALCULATED.3IONS-SCNC: 5 MMOL/L (ref 4–13)
ANION GAP SERPL CALCULATED.3IONS-SCNC: 7 MMOL/L (ref 4–13)
ANION GAP SERPL CALCULATED.3IONS-SCNC: 9 MMOL/L (ref 4–13)
ANISOCYTOSIS BLD QL SMEAR: PRESENT
APTT PPP: 48 SECONDS (ref 26–38)
ARTERIAL PATENCY WRIST A: YES
AST SERPL W P-5'-P-CCNC: 56 U/L (ref 5–45)
AST SERPL W P-5'-P-CCNC: 73 U/L (ref 5–45)
AST SERPL W P-5'-P-CCNC: 93 U/L (ref 5–45)
ATRIAL RATE: 129 BPM
ATRIAL RATE: 67 BPM
ATRIAL RATE: 70 BPM
ATRIAL RATE: 77 BPM
ATRIAL RATE: 94 BPM
BACTERIA UR QL AUTO: ABNORMAL /HPF
BASE EX.OXY STD BLDV CALC-SCNC: 71.6 % (ref 60–80)
BASE EX.OXY STD BLDV CALC-SCNC: 82.9 % (ref 60–80)
BASE EXCESS BLDA CALC-SCNC: -1.9 MMOL/L
BASE EXCESS BLDA CALC-SCNC: -2.6 MMOL/L
BASE EXCESS BLDA CALC-SCNC: -3.1 MMOL/L
BASE EXCESS BLDA CALC-SCNC: -5.9 MMOL/L
BASE EXCESS BLDA CALC-SCNC: -7 MMOL/L
BASE EXCESS BLDA CALC-SCNC: -7.7 MMOL/L
BASE EXCESS BLDA CALC-SCNC: -7.7 MMOL/L
BASE EXCESS BLDA CALC-SCNC: -8.1 MMOL/L
BASE EXCESS BLDV CALC-SCNC: -10 MMOL/L
BASE EXCESS BLDV CALC-SCNC: -6 MMOL/L
BASOPHILS # BLD AUTO: 0.01 THOUSANDS/ΜL (ref 0–0.1)
BASOPHILS # BLD AUTO: 0.02 THOUSANDS/ΜL (ref 0–0.1)
BASOPHILS # BLD MANUAL: 0 THOUSAND/UL (ref 0–0.1)
BASOPHILS NFR BLD AUTO: 0 % (ref 0–1)
BASOPHILS NFR BLD AUTO: 0 % (ref 0–1)
BASOPHILS NFR MAR MANUAL: 0 % (ref 0–1)
BILIRUB DIRECT SERPL-MCNC: 0.31 MG/DL (ref 0–0.2)
BILIRUB SERPL-MCNC: 0.3 MG/DL (ref 0.2–1)
BILIRUB SERPL-MCNC: 0.74 MG/DL (ref 0.2–1)
BILIRUB SERPL-MCNC: 0.99 MG/DL (ref 0.2–1)
BILIRUB UR QL STRIP: NEGATIVE
BLD GP AB SCN SERPL QL: NEGATIVE
BLD GP AB SCN SERPL QL: NEGATIVE
BODY TEMPERATURE: 96.5 DEGREES FEHRENHEIT
BODY TEMPERATURE: 96.6 DEGREES FEHRENHEIT
BODY TEMPERATURE: 96.8 DEGREES FEHRENHEIT
BPU ID: NORMAL
BUN SERPL-MCNC: 30 MG/DL (ref 5–25)
BUN SERPL-MCNC: 33 MG/DL (ref 5–25)
BUN SERPL-MCNC: 34 MG/DL (ref 5–25)
BUN SERPL-MCNC: 34 MG/DL (ref 5–25)
BUN SERPL-MCNC: 36 MG/DL (ref 5–25)
CA-I BLD-SCNC: 1.08 MMOL/L (ref 1.12–1.32)
CA-I BLD-SCNC: 1.09 MMOL/L (ref 1.12–1.32)
CA-I BLD-SCNC: 1.11 MMOL/L (ref 1.12–1.32)
CA-I BLD-SCNC: 1.18 MMOL/L (ref 1.12–1.32)
CA-I BLD-SCNC: 1.22 MMOL/L (ref 1.12–1.32)
CA-I BLD-SCNC: 1.24 MMOL/L (ref 1.12–1.32)
CALCIUM SERPL-MCNC: 7.7 MG/DL (ref 8.3–10.1)
CALCIUM SERPL-MCNC: 8 MG/DL (ref 8.3–10.1)
CALCIUM SERPL-MCNC: 8.3 MG/DL (ref 8.3–10.1)
CALCIUM SERPL-MCNC: 8.5 MG/DL (ref 8.3–10.1)
CALCIUM SERPL-MCNC: 9 MG/DL (ref 8.3–10.1)
CHLORIDE SERPL-SCNC: 105 MMOL/L (ref 100–108)
CHLORIDE SERPL-SCNC: 107 MMOL/L (ref 100–108)
CHLORIDE SERPL-SCNC: 108 MMOL/L (ref 100–108)
CHLORIDE SERPL-SCNC: 109 MMOL/L (ref 100–108)
CHLORIDE SERPL-SCNC: 109 MMOL/L (ref 100–108)
CHLORIDE SERPL-SCNC: 110 MMOL/L (ref 100–108)
CHLORIDE SERPL-SCNC: 111 MMOL/L (ref 100–108)
CLARITY UR: ABNORMAL
CO2 SERPL-SCNC: 22 MMOL/L (ref 21–32)
CO2 SERPL-SCNC: 24 MMOL/L (ref 21–32)
CO2 SERPL-SCNC: 27 MMOL/L (ref 21–32)
COLOR UR: YELLOW
CREAT SERPL-MCNC: 1.69 MG/DL (ref 0.6–1.3)
CREAT SERPL-MCNC: 1.74 MG/DL (ref 0.6–1.3)
CREAT SERPL-MCNC: 1.81 MG/DL (ref 0.6–1.3)
CREAT SERPL-MCNC: 1.82 MG/DL (ref 0.6–1.3)
CREAT SERPL-MCNC: 1.83 MG/DL (ref 0.6–1.3)
CREAT SERPL-MCNC: 1.83 MG/DL (ref 0.6–1.3)
CREAT SERPL-MCNC: 1.87 MG/DL (ref 0.6–1.3)
CROSSMATCH: NORMAL
EOSINOPHIL # BLD AUTO: 0 THOUSAND/ΜL (ref 0–0.61)
EOSINOPHIL # BLD AUTO: 0 THOUSAND/ΜL (ref 0–0.61)
EOSINOPHIL # BLD MANUAL: 0 THOUSAND/UL (ref 0–0.4)
EOSINOPHIL NFR BLD AUTO: 0 % (ref 0–6)
EOSINOPHIL NFR BLD AUTO: 0 % (ref 0–6)
EOSINOPHIL NFR BLD MANUAL: 0 % (ref 0–6)
ERYTHROCYTE [DISTWIDTH] IN BLOOD BY AUTOMATED COUNT: 23.7 % (ref 11.6–15.1)
ERYTHROCYTE [DISTWIDTH] IN BLOOD BY AUTOMATED COUNT: 23.9 % (ref 11.6–15.1)
ERYTHROCYTE [DISTWIDTH] IN BLOOD BY AUTOMATED COUNT: 24.3 % (ref 11.6–15.1)
ERYTHROCYTE [DISTWIDTH] IN BLOOD BY AUTOMATED COUNT: 24.4 % (ref 11.6–15.1)
GFR SERPL CREATININE-BSD FRML MDRD: 25 ML/MIN/1.73SQ M
GFR SERPL CREATININE-BSD FRML MDRD: 26 ML/MIN/1.73SQ M
GFR SERPL CREATININE-BSD FRML MDRD: 27 ML/MIN/1.73SQ M
GFR SERPL CREATININE-BSD FRML MDRD: 28 ML/MIN/1.73SQ M
GLUCOSE SERPL-MCNC: 121 MG/DL (ref 65–140)
GLUCOSE SERPL-MCNC: 127 MG/DL (ref 65–140)
GLUCOSE SERPL-MCNC: 146 MG/DL (ref 65–140)
GLUCOSE SERPL-MCNC: 170 MG/DL (ref 65–140)
GLUCOSE SERPL-MCNC: 211 MG/DL (ref 65–140)
GLUCOSE SERPL-MCNC: 256 MG/DL (ref 65–140)
GLUCOSE SERPL-MCNC: 262 MG/DL (ref 65–140)
GLUCOSE SERPL-MCNC: 263 MG/DL (ref 65–140)
GLUCOSE UR STRIP-MCNC: NEGATIVE MG/DL
HCO3 BLDA-SCNC: 18.2 MMOL/L (ref 22–28)
HCO3 BLDA-SCNC: 19.3 MMOL/L (ref 22–28)
HCO3 BLDA-SCNC: 19.9 MMOL/L (ref 22–28)
HCO3 BLDA-SCNC: 20.4 MMOL/L (ref 22–28)
HCO3 BLDA-SCNC: 21.2 MMOL/L (ref 22–28)
HCO3 BLDA-SCNC: 21.2 MMOL/L (ref 22–28)
HCO3 BLDA-SCNC: 21.5 MMOL/L (ref 22–28)
HCO3 BLDA-SCNC: 22.7 MMOL/L (ref 22–28)
HCO3 BLDV-SCNC: 18.6 MMOL/L (ref 24–30)
HCO3 BLDV-SCNC: 21 MMOL/L (ref 24–30)
HCT VFR BLD AUTO: 21.1 % (ref 34.8–46.1)
HCT VFR BLD AUTO: 22.6 % (ref 34.8–46.1)
HCT VFR BLD AUTO: 22.8 % (ref 34.8–46.1)
HCT VFR BLD AUTO: 24.5 % (ref 34.8–46.1)
HCT VFR BLD AUTO: 26.6 % (ref 34.8–46.1)
HCT VFR BLD AUTO: 27.1 % (ref 34.8–46.1)
HCT VFR BLD AUTO: 27.2 % (ref 34.8–46.1)
HCT VFR BLD AUTO: 29.4 % (ref 34.8–46.1)
HCT VFR BLD AUTO: 29.9 % (ref 34.8–46.1)
HGB BLD-MCNC: 5.3 G/DL (ref 11.5–15.4)
HGB BLD-MCNC: 6.8 G/DL (ref 11.5–15.4)
HGB BLD-MCNC: 6.8 G/DL (ref 11.5–15.4)
HGB BLD-MCNC: 7.2 G/DL (ref 11.5–15.4)
HGB BLD-MCNC: 7.8 G/DL (ref 11.5–15.4)
HGB BLD-MCNC: 7.9 G/DL (ref 11.5–15.4)
HGB BLD-MCNC: 8.1 G/DL (ref 11.5–15.4)
HGB BLD-MCNC: 8.9 G/DL (ref 11.5–15.4)
HGB BLD-MCNC: 9.1 G/DL (ref 11.5–15.4)
HGB UR QL STRIP.AUTO: ABNORMAL
HOLD SPECIMEN: NORMAL
HOROWITZ INDEX BLDA+IHG-RTO: 30 MM[HG]
HOROWITZ INDEX BLDA+IHG-RTO: 40 MM[HG]
HOROWITZ INDEX BLDA+IHG-RTO: 50 MM[HG]
HOROWITZ INDEX BLDA+IHG-RTO: 50 MM[HG]
I-TIME: 0.8
IMM GRANULOCYTES # BLD AUTO: 0.06 THOUSAND/UL (ref 0–0.2)
IMM GRANULOCYTES # BLD AUTO: 0.07 THOUSAND/UL (ref 0–0.2)
IMM GRANULOCYTES NFR BLD AUTO: 0 % (ref 0–2)
IMM GRANULOCYTES NFR BLD AUTO: 1 % (ref 0–2)
INR PPP: 1.25 (ref 0.86–1.17)
INR PPP: 1.26 (ref 0.86–1.17)
KETONES UR STRIP-MCNC: NEGATIVE MG/DL
LACTATE SERPL-SCNC: 3.5 MMOL/L (ref 0.5–2)
LACTATE SERPL-SCNC: 3.6 MMOL/L (ref 0.5–2)
LACTATE SERPL-SCNC: 3.6 MMOL/L (ref 0.5–2)
LACTATE SERPL-SCNC: 4 MMOL/L (ref 0.5–2)
LACTATE SERPL-SCNC: 6 MMOL/L (ref 0.5–2)
LACTATE SERPL-SCNC: 6.4 MMOL/L (ref 0.5–2)
LACTATE SERPL-SCNC: 6.5 MMOL/L (ref 0.5–2)
LACTATE SERPL-SCNC: 7.5 MMOL/L (ref 0.5–2)
LEUKOCYTE ESTERASE UR QL STRIP: NEGATIVE
LYMPHOCYTES # BLD AUTO: 0.4 THOUSANDS/ΜL (ref 0.6–4.47)
LYMPHOCYTES # BLD AUTO: 0.44 THOUSANDS/ΜL (ref 0.6–4.47)
LYMPHOCYTES # BLD AUTO: 19 % (ref 14–44)
LYMPHOCYTES # BLD AUTO: 3.52 THOUSAND/UL (ref 0.6–4.47)
LYMPHOCYTES NFR BLD AUTO: 3 % (ref 14–44)
LYMPHOCYTES NFR BLD AUTO: 3 % (ref 14–44)
MAGNESIUM SERPL-MCNC: 2 MG/DL (ref 1.6–2.6)
MAGNESIUM SERPL-MCNC: 2.9 MG/DL (ref 1.6–2.6)
MAGNESIUM SERPL-MCNC: 3 MG/DL (ref 1.6–2.6)
MAGNESIUM SERPL-MCNC: 3.2 MG/DL (ref 1.6–2.6)
MAGNESIUM SERPL-MCNC: 3.4 MG/DL (ref 1.6–2.6)
MAGNESIUM SERPL-MCNC: 3.8 MG/DL (ref 1.6–2.6)
MCH RBC QN AUTO: 18.3 PG (ref 26.8–34.3)
MCH RBC QN AUTO: 21.2 PG (ref 26.8–34.3)
MCH RBC QN AUTO: 21.5 PG (ref 26.8–34.3)
MCH RBC QN AUTO: 23 PG (ref 26.8–34.3)
MCHC RBC AUTO-ENTMCNC: 25.1 G/DL (ref 31.4–37.4)
MCHC RBC AUTO-ENTMCNC: 29.4 G/DL (ref 31.4–37.4)
MCHC RBC AUTO-ENTMCNC: 29.8 G/DL (ref 31.4–37.4)
MCHC RBC AUTO-ENTMCNC: 30.3 G/DL (ref 31.4–37.4)
MCV RBC AUTO: 72 FL (ref 82–98)
MCV RBC AUTO: 72 FL (ref 82–98)
MCV RBC AUTO: 73 FL (ref 82–98)
MCV RBC AUTO: 76 FL (ref 82–98)
MONOCYTES # BLD AUTO: 0.32 THOUSAND/ΜL (ref 0.17–1.22)
MONOCYTES # BLD AUTO: 0.46 THOUSAND/ΜL (ref 0.17–1.22)
MONOCYTES # BLD AUTO: 0.56 THOUSAND/UL (ref 0–1.22)
MONOCYTES NFR BLD AUTO: 2 % (ref 4–12)
MONOCYTES NFR BLD AUTO: 3 % (ref 4–12)
MONOCYTES NFR BLD: 3 % (ref 4–12)
NEUTROPHILS # BLD AUTO: 13.59 THOUSANDS/ΜL (ref 1.85–7.62)
NEUTROPHILS # BLD AUTO: 13.7 THOUSANDS/ΜL (ref 1.85–7.62)
NEUTROPHILS # BLD MANUAL: 14.47 THOUSAND/UL (ref 1.85–7.62)
NEUTS BAND NFR BLD MANUAL: 9 % (ref 0–8)
NEUTS SEG NFR BLD AUTO: 69 % (ref 43–75)
NEUTS SEG NFR BLD AUTO: 93 % (ref 43–75)
NEUTS SEG NFR BLD AUTO: 95 % (ref 43–75)
NITRITE UR QL STRIP: NEGATIVE
NON-SQ EPI CELLS URNS QL MICRO: ABNORMAL /HPF
NRBC BLD AUTO-RTO: 0 /100 WBCS
O2 CT BLDA-SCNC: 10.3 ML/DL (ref 16–23)
O2 CT BLDA-SCNC: 10.3 ML/DL (ref 16–23)
O2 CT BLDA-SCNC: 11.2 ML/DL (ref 16–23)
O2 CT BLDA-SCNC: 11.9 ML/DL (ref 16–23)
O2 CT BLDA-SCNC: 12.1 ML/DL (ref 16–23)
O2 CT BLDA-SCNC: 12.5 ML/DL (ref 16–23)
O2 CT BLDA-SCNC: 13.2 ML/DL (ref 16–23)
O2 CT BLDA-SCNC: 8.7 ML/DL (ref 16–23)
O2 CT BLDV-SCNC: 10.5 ML/DL
O2 CT BLDV-SCNC: 9.8 ML/DL
OTHER STN SPEC: ABNORMAL
OXYHGB MFR BLDA: 90.6 % (ref 94–97)
OXYHGB MFR BLDA: 93.3 % (ref 94–97)
OXYHGB MFR BLDA: 93.4 % (ref 94–97)
OXYHGB MFR BLDA: 95 % (ref 94–97)
OXYHGB MFR BLDA: 95.2 % (ref 94–97)
OXYHGB MFR BLDA: 95.4 % (ref 94–97)
OXYHGB MFR BLDA: 96.2 % (ref 94–97)
OXYHGB MFR BLDA: 97.3 % (ref 94–97)
P AXIS: 46 DEGREES
P AXIS: 78 DEGREES
P AXIS: 80 DEGREES
P AXIS: 84 DEGREES
P AXIS: 96 DEGREES
PCO2 BLDA: 30.8 MM HG (ref 36–44)
PCO2 BLDA: 34.4 MM HG (ref 36–44)
PCO2 BLDA: 34.9 MM HG (ref 36–44)
PCO2 BLDA: 40.3 MM HG (ref 36–44)
PCO2 BLDA: 41.5 MM HG (ref 36–44)
PCO2 BLDA: 48.5 MM HG (ref 36–44)
PCO2 BLDA: 55.4 MM HG (ref 36–44)
PCO2 BLDA: 63.8 MM HG (ref 36–44)
PCO2 BLDV: 48.5 MM HG (ref 42–50)
PCO2 BLDV: 55.6 MM HG (ref 42–50)
PCO2 TEMP ADJ BLDA: 32.8 MM HG (ref 36–44)
PCO2 TEMP ADJ BLDA: 38.2 MM HG (ref 36–44)
PEEP RESPIRATORY: 5 CM[H2O]
PH BLD: 7.33 [PH] (ref 7.35–7.45)
PH BLD: 7.42 [PH] (ref 7.35–7.45)
PH BLDA: 7.14 [PH] (ref 7.35–7.45)
PH BLDA: 7.18 [PH] (ref 7.35–7.45)
PH BLDA: 7.22 [PH] (ref 7.35–7.45)
PH BLDA: 7.31 [PH] (ref 7.35–7.45)
PH BLDA: 7.33 [PH] (ref 7.35–7.45)
PH BLDA: 7.36 [PH] (ref 7.35–7.45)
PH BLDA: 7.41 [PH] (ref 7.35–7.45)
PH BLDA: 7.46 [PH] (ref 7.35–7.45)
PH BLDV: 7.14 [PH] (ref 7.3–7.4)
PH BLDV: 7.25 [PH] (ref 7.3–7.4)
PH UR STRIP.AUTO: 5 [PH]
PHOSPHATE SERPL-MCNC: 3.7 MG/DL (ref 2.3–4.1)
PHOSPHATE SERPL-MCNC: 4 MG/DL (ref 2.3–4.1)
PHOSPHATE SERPL-MCNC: 4.3 MG/DL (ref 2.3–4.1)
PHOSPHATE SERPL-MCNC: 4.4 MG/DL (ref 2.3–4.1)
PHOSPHATE SERPL-MCNC: 4.8 MG/DL (ref 2.3–4.1)
PHOSPHATE SERPL-MCNC: 5 MG/DL (ref 2.3–4.1)
PLATELET # BLD AUTO: 125 THOUSANDS/UL (ref 149–390)
PLATELET # BLD AUTO: 150 THOUSANDS/UL (ref 149–390)
PLATELET # BLD AUTO: 155 THOUSANDS/UL (ref 149–390)
PLATELET # BLD AUTO: 162 THOUSANDS/UL (ref 149–390)
PLATELET BLD QL SMEAR: ADEQUATE
PMV BLD AUTO: 10.3 FL (ref 8.9–12.7)
PO2 BLD: 76.8 MM HG (ref 75–129)
PO2 BLD: 97.4 MM HG (ref 75–129)
PO2 BLDA: 104.4 MM HG (ref 75–129)
PO2 BLDA: 132.3 MM HG (ref 75–129)
PO2 BLDA: 219.5 MM HG (ref 75–129)
PO2 BLDA: 78.5 MM HG (ref 75–129)
PO2 BLDA: 82.5 MM HG (ref 75–129)
PO2 BLDA: 85.1 MM HG (ref 75–129)
PO2 BLDA: 95.2 MM HG (ref 75–129)
PO2 BLDA: 97.9 MM HG (ref 75–129)
PO2 BLDV: 44.2 MM HG (ref 35–45)
PO2 BLDV: 63.4 MM HG (ref 35–45)
POTASSIUM SERPL-SCNC: 3.8 MMOL/L (ref 3.5–5.3)
POTASSIUM SERPL-SCNC: 3.9 MMOL/L (ref 3.5–5.3)
POTASSIUM SERPL-SCNC: 4.3 MMOL/L (ref 3.5–5.3)
POTASSIUM SERPL-SCNC: 4.4 MMOL/L (ref 3.5–5.3)
PR INTERVAL: 120 MS
PR INTERVAL: 129 MS
PR INTERVAL: 142 MS
PR INTERVAL: 146 MS
PR INTERVAL: 167 MS
PRESSURE CONTROL: 15
PROCALCITONIN SERPL-MCNC: 36.29 NG/ML
PROT SERPL-MCNC: 5.7 G/DL (ref 6.4–8.2)
PROT SERPL-MCNC: 5.9 G/DL (ref 6.4–8.2)
PROT SERPL-MCNC: 6.2 G/DL (ref 6.4–8.2)
PROT UR STRIP-MCNC: ABNORMAL MG/DL
PROTHROMBIN TIME: 15.2 SECONDS (ref 11.8–14.2)
PROTHROMBIN TIME: 15.8 SECONDS (ref 11.8–14.2)
QRS AXIS: 73 DEGREES
QRS AXIS: 79 DEGREES
QRS AXIS: 89 DEGREES
QRS AXIS: 90 DEGREES
QRS AXIS: 92 DEGREES
QRSD INTERVAL: 75 MS
QRSD INTERVAL: 76 MS
QRSD INTERVAL: 79 MS
QRSD INTERVAL: 83 MS
QRSD INTERVAL: 88 MS
QT INTERVAL: 328 MS
QT INTERVAL: 383 MS
QT INTERVAL: 450 MS
QT INTERVAL: 454 MS
QT INTERVAL: 533 MS
QTC INTERVAL: 479 MS
QTC INTERVAL: 480 MS
QTC INTERVAL: 486 MS
QTC INTERVAL: 514 MS
QTC INTERVAL: 563 MS
RBC # BLD AUTO: 2.9 MILLION/UL (ref 3.81–5.12)
RBC # BLD AUTO: 3.39 MILLION/UL (ref 3.81–5.12)
RBC # BLD AUTO: 3.77 MILLION/UL (ref 3.81–5.12)
RBC # BLD AUTO: 3.87 MILLION/UL (ref 3.81–5.12)
RBC #/AREA URNS AUTO: ABNORMAL /HPF
RH BLD: POSITIVE
RH BLD: POSITIVE
SODIUM SERPL-SCNC: 139 MMOL/L (ref 136–145)
SODIUM SERPL-SCNC: 140 MMOL/L (ref 136–145)
SODIUM SERPL-SCNC: 141 MMOL/L (ref 136–145)
SODIUM SERPL-SCNC: 142 MMOL/L (ref 136–145)
SODIUM SERPL-SCNC: 142 MMOL/L (ref 136–145)
SODIUM SERPL-SCNC: 144 MMOL/L (ref 136–145)
SODIUM SERPL-SCNC: 145 MMOL/L (ref 136–145)
SP GR UR STRIP.AUTO: 1.02 (ref 1–1.03)
SPECIMEN EXPIRATION DATE: NORMAL
SPECIMEN EXPIRATION DATE: NORMAL
SPECIMEN SOURCE: ABNORMAL
T WAVE AXIS: 46 DEGREES
T WAVE AXIS: 57 DEGREES
T WAVE AXIS: 59 DEGREES
T WAVE AXIS: 76 DEGREES
T WAVE AXIS: 97 DEGREES
TOTAL CELLS COUNTED SPEC: 100
TROPONIN I SERPL-MCNC: <0.02 NG/ML
UNIT DISPENSE STATUS: NORMAL
UNIT PRODUCT CODE: NORMAL
UNIT RH: NORMAL
UROBILINOGEN UR QL STRIP.AUTO: 1 E.U./DL
VENT AC: 14
VENT AC: 18
VENT AC: 22
VENT- AC: AC
VENTRICULAR RATE: 129 BPM
VENTRICULAR RATE: 67 BPM
VENTRICULAR RATE: 70 BPM
VENTRICULAR RATE: 77 BPM
VENTRICULAR RATE: 94 BPM
VT SETTING VENT: 250 ML
VT SETTING VENT: 300 ML
VT SETTING VENT: 400 ML
WBC # BLD AUTO: 14.5 THOUSAND/UL (ref 4.31–10.16)
WBC # BLD AUTO: 14.57 THOUSAND/UL (ref 4.31–10.16)
WBC # BLD AUTO: 18.55 THOUSAND/UL (ref 4.31–10.16)
WBC # BLD AUTO: 6.75 THOUSAND/UL (ref 4.31–10.16)
WBC #/AREA URNS AUTO: ABNORMAL /HPF

## 2019-01-01 PROCEDURE — 70450 CT HEAD/BRAIN W/O DYE: CPT

## 2019-01-01 PROCEDURE — 82805 BLOOD GASES W/O2 SATURATION: CPT | Performed by: PHYSICIAN ASSISTANT

## 2019-01-01 PROCEDURE — 93005 ELECTROCARDIOGRAM TRACING: CPT

## 2019-01-01 PROCEDURE — 80048 BASIC METABOLIC PNL TOTAL CA: CPT | Performed by: PHYSICIAN ASSISTANT

## 2019-01-01 PROCEDURE — 93010 ELECTROCARDIOGRAM REPORT: CPT | Performed by: INTERNAL MEDICINE

## 2019-01-01 PROCEDURE — 94003 VENT MGMT INPAT SUBQ DAY: CPT

## 2019-01-01 PROCEDURE — 81001 URINALYSIS AUTO W/SCOPE: CPT | Performed by: NURSE PRACTITIONER

## 2019-01-01 PROCEDURE — 71045 X-RAY EXAM CHEST 1 VIEW: CPT

## 2019-01-01 PROCEDURE — 85610 PROTHROMBIN TIME: CPT | Performed by: PHYSICIAN ASSISTANT

## 2019-01-01 PROCEDURE — 99291 CRITICAL CARE FIRST HOUR: CPT | Performed by: EMERGENCY MEDICINE

## 2019-01-01 PROCEDURE — 96365 THER/PROPH/DIAG IV INF INIT: CPT

## 2019-01-01 PROCEDURE — 83605 ASSAY OF LACTIC ACID: CPT | Performed by: PHYSICIAN ASSISTANT

## 2019-01-01 PROCEDURE — 80053 COMPREHEN METABOLIC PANEL: CPT | Performed by: PHYSICIAN ASSISTANT

## 2019-01-01 PROCEDURE — 86920 COMPATIBILITY TEST SPIN: CPT

## 2019-01-01 PROCEDURE — 83605 ASSAY OF LACTIC ACID: CPT | Performed by: NURSE PRACTITIONER

## 2019-01-01 PROCEDURE — P9016 RBC LEUKOCYTES REDUCED: HCPCS

## 2019-01-01 PROCEDURE — 95816 EEG AWAKE AND DROWSY: CPT

## 2019-01-01 PROCEDURE — 85025 COMPLETE CBC W/AUTO DIFF WBC: CPT | Performed by: PHYSICIAN ASSISTANT

## 2019-01-01 PROCEDURE — 84484 ASSAY OF TROPONIN QUANT: CPT | Performed by: EMERGENCY MEDICINE

## 2019-01-01 PROCEDURE — 74176 CT ABD & PELVIS W/O CONTRAST: CPT

## 2019-01-01 PROCEDURE — 94760 N-INVAS EAR/PLS OXIMETRY 1: CPT

## 2019-01-01 PROCEDURE — 02HV33Z INSERTION OF INFUSION DEVICE INTO SUPERIOR VENA CAVA, PERCUTANEOUS APPROACH: ICD-10-PCS | Performed by: ANESTHESIOLOGY

## 2019-01-01 PROCEDURE — 87040 BLOOD CULTURE FOR BACTERIA: CPT | Performed by: NURSE PRACTITIONER

## 2019-01-01 PROCEDURE — 84100 ASSAY OF PHOSPHORUS: CPT | Performed by: PHYSICIAN ASSISTANT

## 2019-01-01 PROCEDURE — 86850 RBC ANTIBODY SCREEN: CPT | Performed by: EMERGENCY MEDICINE

## 2019-01-01 PROCEDURE — 83735 ASSAY OF MAGNESIUM: CPT | Performed by: PHYSICIAN ASSISTANT

## 2019-01-01 PROCEDURE — 71250 CT THORAX DX C-: CPT

## 2019-01-01 PROCEDURE — 82948 REAGENT STRIP/BLOOD GLUCOSE: CPT

## 2019-01-01 PROCEDURE — 85730 THROMBOPLASTIN TIME PARTIAL: CPT | Performed by: EMERGENCY MEDICINE

## 2019-01-01 PROCEDURE — 36556 INSERT NON-TUNNEL CV CATH: CPT | Performed by: PHYSICIAN ASSISTANT

## 2019-01-01 PROCEDURE — 96366 THER/PROPH/DIAG IV INF ADDON: CPT

## 2019-01-01 PROCEDURE — NC001 PR NO CHARGE: Performed by: NURSE PRACTITIONER

## 2019-01-01 PROCEDURE — 95822 EEG COMA OR SLEEP ONLY: CPT | Performed by: PSYCHIATRY & NEUROLOGY

## 2019-01-01 PROCEDURE — 86901 BLOOD TYPING SEROLOGIC RH(D): CPT | Performed by: PHYSICIAN ASSISTANT

## 2019-01-01 PROCEDURE — 86900 BLOOD TYPING SEROLOGIC ABO: CPT | Performed by: PHYSICIAN ASSISTANT

## 2019-01-01 PROCEDURE — 85014 HEMATOCRIT: CPT | Performed by: PHYSICIAN ASSISTANT

## 2019-01-01 PROCEDURE — 82330 ASSAY OF CALCIUM: CPT | Performed by: PHYSICIAN ASSISTANT

## 2019-01-01 PROCEDURE — 85018 HEMOGLOBIN: CPT | Performed by: NURSE PRACTITIONER

## 2019-01-01 PROCEDURE — 87186 SC STD MICRODIL/AGAR DIL: CPT | Performed by: NURSE PRACTITIONER

## 2019-01-01 PROCEDURE — 80053 COMPREHEN METABOLIC PANEL: CPT | Performed by: EMERGENCY MEDICINE

## 2019-01-01 PROCEDURE — 36620 INSERTION CATHETER ARTERY: CPT

## 2019-01-01 PROCEDURE — 99238 HOSP IP/OBS DSCHRG MGMT 30/<: CPT | Performed by: NURSE PRACTITIONER

## 2019-01-01 PROCEDURE — 93306 TTE W/DOPPLER COMPLETE: CPT | Performed by: INTERNAL MEDICINE

## 2019-01-01 PROCEDURE — 86850 RBC ANTIBODY SCREEN: CPT | Performed by: PHYSICIAN ASSISTANT

## 2019-01-01 PROCEDURE — 86901 BLOOD TYPING SEROLOGIC RH(D): CPT | Performed by: EMERGENCY MEDICINE

## 2019-01-01 PROCEDURE — C9113 INJ PANTOPRAZOLE SODIUM, VIA: HCPCS | Performed by: PHYSICIAN ASSISTANT

## 2019-01-01 PROCEDURE — 82805 BLOOD GASES W/O2 SATURATION: CPT | Performed by: EMERGENCY MEDICINE

## 2019-01-01 PROCEDURE — 93306 TTE W/DOPPLER COMPLETE: CPT

## 2019-01-01 PROCEDURE — 87205 SMEAR GRAM STAIN: CPT | Performed by: NURSE PRACTITIONER

## 2019-01-01 PROCEDURE — 87070 CULTURE OTHR SPECIMN AEROBIC: CPT | Performed by: NURSE PRACTITIONER

## 2019-01-01 PROCEDURE — 36600 WITHDRAWAL OF ARTERIAL BLOOD: CPT

## 2019-01-01 PROCEDURE — 94002 VENT MGMT INPAT INIT DAY: CPT

## 2019-01-01 PROCEDURE — 96374 THER/PROPH/DIAG INJ IV PUSH: CPT

## 2019-01-01 PROCEDURE — 87147 CULTURE TYPE IMMUNOLOGIC: CPT | Performed by: NURSE PRACTITIONER

## 2019-01-01 PROCEDURE — 99291 CRITICAL CARE FIRST HOUR: CPT

## 2019-01-01 PROCEDURE — 92950 HEART/LUNG RESUSCITATION CPR: CPT

## 2019-01-01 PROCEDURE — 82805 BLOOD GASES W/O2 SATURATION: CPT | Performed by: NURSE PRACTITIONER

## 2019-01-01 PROCEDURE — 80076 HEPATIC FUNCTION PANEL: CPT | Performed by: EMERGENCY MEDICINE

## 2019-01-01 PROCEDURE — 36415 COLL VENOUS BLD VENIPUNCTURE: CPT

## 2019-01-01 PROCEDURE — 85007 BL SMEAR W/DIFF WBC COUNT: CPT | Performed by: EMERGENCY MEDICINE

## 2019-01-01 PROCEDURE — 87081 CULTURE SCREEN ONLY: CPT | Performed by: NURSE PRACTITIONER

## 2019-01-01 PROCEDURE — 85610 PROTHROMBIN TIME: CPT | Performed by: EMERGENCY MEDICINE

## 2019-01-01 PROCEDURE — 85027 COMPLETE CBC AUTOMATED: CPT | Performed by: NURSE PRACTITIONER

## 2019-01-01 PROCEDURE — 99232 SBSQ HOSP IP/OBS MODERATE 35: CPT | Performed by: PHYSICIAN ASSISTANT

## 2019-01-01 PROCEDURE — 5A1945Z RESPIRATORY VENTILATION, 24-96 CONSECUTIVE HOURS: ICD-10-PCS | Performed by: ANESTHESIOLOGY

## 2019-01-01 PROCEDURE — 0BH17EZ INSERTION OF ENDOTRACHEAL AIRWAY INTO TRACHEA, VIA NATURAL OR ARTIFICIAL OPENING: ICD-10-PCS | Performed by: ANESTHESIOLOGY

## 2019-01-01 PROCEDURE — 85018 HEMOGLOBIN: CPT | Performed by: PHYSICIAN ASSISTANT

## 2019-01-01 PROCEDURE — 86900 BLOOD TYPING SEROLOGIC ABO: CPT | Performed by: EMERGENCY MEDICINE

## 2019-01-01 PROCEDURE — NC001 PR NO CHARGE: Performed by: PHYSICIAN ASSISTANT

## 2019-01-01 PROCEDURE — 85027 COMPLETE CBC AUTOMATED: CPT | Performed by: EMERGENCY MEDICINE

## 2019-01-01 PROCEDURE — 85014 HEMATOCRIT: CPT | Performed by: NURSE PRACTITIONER

## 2019-01-01 PROCEDURE — 84145 PROCALCITONIN (PCT): CPT | Performed by: NURSE PRACTITIONER

## 2019-01-01 PROCEDURE — 86923 COMPATIBILITY TEST ELECTRIC: CPT

## 2019-01-01 RX ORDER — CHLORHEXIDINE GLUCONATE 0.12 MG/ML
15 RINSE ORAL EVERY 12 HOURS SCHEDULED
Status: DISCONTINUED | OUTPATIENT
Start: 2019-01-01 | End: 2019-01-01

## 2019-01-01 RX ORDER — SODIUM CHLORIDE, SODIUM GLUCONATE, SODIUM ACETATE, POTASSIUM CHLORIDE, MAGNESIUM CHLORIDE, SODIUM PHOSPHATE, DIBASIC, AND POTASSIUM PHOSPHATE .53; .5; .37; .037; .03; .012; .00082 G/100ML; G/100ML; G/100ML; G/100ML; G/100ML; G/100ML; G/100ML
1000 INJECTION, SOLUTION INTRAVENOUS ONCE
Status: DISCONTINUED | OUTPATIENT
Start: 2019-01-01 | End: 2019-01-01

## 2019-01-01 RX ORDER — CHLORHEXIDINE GLUCONATE 0.12 MG/ML
15 RINSE ORAL EVERY 12 HOURS SCHEDULED
Status: DISCONTINUED | OUTPATIENT
Start: 2019-01-01 | End: 2019-01-01 | Stop reason: SDUPTHER

## 2019-01-01 RX ORDER — PANTOPRAZOLE SODIUM 40 MG/1
40 INJECTION, POWDER, FOR SOLUTION INTRAVENOUS EVERY 12 HOURS SCHEDULED
Status: DISCONTINUED | OUTPATIENT
Start: 2019-01-01 | End: 2019-01-01

## 2019-01-01 RX ORDER — FENTANYL CITRATE 50 UG/ML
50 INJECTION, SOLUTION INTRAMUSCULAR; INTRAVENOUS EVERY 2 HOUR PRN
Status: DISCONTINUED | OUTPATIENT
Start: 2019-01-01 | End: 2019-01-01

## 2019-01-01 RX ORDER — VANCOMYCIN HYDROCHLORIDE 1 G/200ML
20 INJECTION, SOLUTION INTRAVENOUS ONCE
Status: COMPLETED | OUTPATIENT
Start: 2019-01-01 | End: 2019-01-01

## 2019-01-01 RX ORDER — LORAZEPAM 2 MG/ML
0.5 INJECTION INTRAMUSCULAR
Status: DISCONTINUED | OUTPATIENT
Start: 2019-01-01 | End: 2019-01-01

## 2019-01-01 RX ORDER — ALBUMIN, HUMAN INJ 5% 5 %
12.5 SOLUTION INTRAVENOUS ONCE
Status: COMPLETED | OUTPATIENT
Start: 2019-01-01 | End: 2019-01-01

## 2019-01-01 RX ORDER — MAGNESIUM SULFATE HEPTAHYDRATE 40 MG/ML
2 INJECTION, SOLUTION INTRAVENOUS ONCE
Status: COMPLETED | OUTPATIENT
Start: 2019-01-01 | End: 2019-01-01

## 2019-01-01 RX ORDER — POTASSIUM CHLORIDE 14.9 MG/ML
20 INJECTION INTRAVENOUS ONCE
Status: COMPLETED | OUTPATIENT
Start: 2019-01-01 | End: 2019-01-01

## 2019-01-01 RX ORDER — SODIUM CHLORIDE, SODIUM GLUCONATE, SODIUM ACETATE, POTASSIUM CHLORIDE, MAGNESIUM CHLORIDE, SODIUM PHOSPHATE, DIBASIC, AND POTASSIUM PHOSPHATE .53; .5; .37; .037; .03; .012; .00082 G/100ML; G/100ML; G/100ML; G/100ML; G/100ML; G/100ML; G/100ML
500 INJECTION, SOLUTION INTRAVENOUS ONCE
Status: COMPLETED | OUTPATIENT
Start: 2019-01-01 | End: 2019-01-01

## 2019-01-01 RX ORDER — SODIUM CHLORIDE, SODIUM GLUCONATE, SODIUM ACETATE, POTASSIUM CHLORIDE, MAGNESIUM CHLORIDE, SODIUM PHOSPHATE, DIBASIC, AND POTASSIUM PHOSPHATE .53; .5; .37; .037; .03; .012; .00082 G/100ML; G/100ML; G/100ML; G/100ML; G/100ML; G/100ML; G/100ML
1000 INJECTION, SOLUTION INTRAVENOUS ONCE
Status: COMPLETED | OUTPATIENT
Start: 2019-01-01 | End: 2019-01-01

## 2019-01-01 RX ORDER — MIDAZOLAM HYDROCHLORIDE 1 MG/ML
1 INJECTION INTRAMUSCULAR; INTRAVENOUS ONCE
Status: COMPLETED | OUTPATIENT
Start: 2019-01-01 | End: 2019-01-01

## 2019-01-01 RX ORDER — FENTANYL CITRATE 50 UG/ML
100 INJECTION, SOLUTION INTRAMUSCULAR; INTRAVENOUS ONCE
Status: COMPLETED | OUTPATIENT
Start: 2019-01-01 | End: 2019-01-01

## 2019-01-01 RX ORDER — AMIODARONE HYDROCHLORIDE 50 MG/ML
INJECTION, SOLUTION INTRAVENOUS
Status: DISCONTINUED
Start: 2019-01-01 | End: 2019-01-01 | Stop reason: HOSPADM

## 2019-01-01 RX ORDER — MAGNESIUM SULFATE 1 G/100ML
1 INJECTION INTRAVENOUS ONCE
Status: COMPLETED | OUTPATIENT
Start: 2019-01-01 | End: 2019-01-01

## 2019-01-01 RX ORDER — ACETAMINOPHEN 325 MG/1
975 TABLET ORAL EVERY 6 HOURS
Status: DISCONTINUED | OUTPATIENT
Start: 2019-01-01 | End: 2019-01-01

## 2019-01-01 RX ORDER — GLYCOPYRROLATE 0.2 MG/ML
0.2 INJECTION INTRAMUSCULAR; INTRAVENOUS EVERY 4 HOURS PRN
Status: DISCONTINUED | OUTPATIENT
Start: 2019-01-01 | End: 2019-01-01

## 2019-01-01 RX ADMIN — SODIUM CHLORIDE, SODIUM GLUCONATE, SODIUM ACETATE, POTASSIUM CHLORIDE, MAGNESIUM CHLORIDE, SODIUM PHOSPHATE, DIBASIC, AND POTASSIUM PHOSPHATE 500 ML: .53; .5; .37; .037; .03; .012; .00082 INJECTION, SOLUTION INTRAVENOUS at 15:04

## 2019-01-01 RX ADMIN — VASOPRESSIN 0.04 UNITS/MIN: 20 INJECTION INTRAVENOUS at 08:33

## 2019-01-01 RX ADMIN — NOREPINEPHRINE BITARTRATE 16 MCG/MIN: 1 INJECTION INTRAVENOUS at 09:07

## 2019-01-01 RX ADMIN — SODIUM CHLORIDE, SODIUM GLUCONATE, SODIUM ACETATE, POTASSIUM CHLORIDE, MAGNESIUM CHLORIDE, SODIUM PHOSPHATE, DIBASIC, AND POTASSIUM PHOSPHATE 500 ML: .53; .5; .37; .037; .03; .012; .00082 INJECTION, SOLUTION INTRAVENOUS at 01:13

## 2019-01-01 RX ADMIN — VANCOMYCIN HYDROCHLORIDE 1000 MG: 1 INJECTION, SOLUTION INTRAVENOUS at 11:45

## 2019-01-01 RX ADMIN — NOREPINEPHRINE BITARTRATE 20 MCG/MIN: 1 INJECTION, SOLUTION, CONCENTRATE INTRAVENOUS at 00:05

## 2019-01-01 RX ADMIN — CALCIUM GLUCONATE 2 G: 98 INJECTION, SOLUTION INTRAVENOUS at 18:11

## 2019-01-01 RX ADMIN — PANTOPRAZOLE SODIUM 40 MG: 40 INJECTION, POWDER, FOR SOLUTION INTRAVENOUS at 03:32

## 2019-01-01 RX ADMIN — CHLORHEXIDINE GLUCONATE 0.12% ORAL RINSE 15 ML: 1.2 LIQUID ORAL at 10:41

## 2019-01-01 RX ADMIN — MIDAZOLAM 1 MG: 1 INJECTION INTRAMUSCULAR; INTRAVENOUS at 16:33

## 2019-01-01 RX ADMIN — SODIUM CHLORIDE, SODIUM GLUCONATE, SODIUM ACETATE, POTASSIUM CHLORIDE, MAGNESIUM CHLORIDE, SODIUM PHOSPHATE, DIBASIC, AND POTASSIUM PHOSPHATE 1000 ML: .53; .5; .37; .037; .03; .012; .00082 INJECTION, SOLUTION INTRAVENOUS at 11:15

## 2019-01-01 RX ADMIN — METRONIDAZOLE 500 MG: 500 INJECTION, SOLUTION INTRAVENOUS at 10:59

## 2019-01-01 RX ADMIN — ACETAMINOPHEN 975 MG: 325 TABLET ORAL at 20:19

## 2019-01-01 RX ADMIN — EPINEPHRINE 1 MG: 0.1 INJECTION, SOLUTION ENDOTRACHEAL; INTRACARDIAC; INTRAVENOUS at 20:47

## 2019-01-01 RX ADMIN — NOREPINEPHRINE BITARTRATE 20 MCG/MIN: 1 INJECTION, SOLUTION, CONCENTRATE INTRAVENOUS at 20:59

## 2019-01-01 RX ADMIN — PANTOPRAZOLE SODIUM 40 MG: 40 INJECTION, POWDER, FOR SOLUTION INTRAVENOUS at 06:37

## 2019-01-01 RX ADMIN — ACETAMINOPHEN 975 MG: 325 TABLET ORAL at 10:41

## 2019-01-01 RX ADMIN — SODIUM CHLORIDE, SODIUM GLUCONATE, SODIUM ACETATE, POTASSIUM CHLORIDE, MAGNESIUM CHLORIDE, SODIUM PHOSPHATE, DIBASIC, AND POTASSIUM PHOSPHATE 500 ML: .53; .5; .37; .037; .03; .012; .00082 INJECTION, SOLUTION INTRAVENOUS at 06:46

## 2019-01-01 RX ADMIN — CHLORHEXIDINE GLUCONATE 0.12% ORAL RINSE 15 ML: 1.2 LIQUID ORAL at 08:21

## 2019-01-01 RX ADMIN — PANTOPRAZOLE SODIUM 40 MG: 40 INJECTION, POWDER, FOR SOLUTION INTRAVENOUS at 17:45

## 2019-01-01 RX ADMIN — CHLORHEXIDINE GLUCONATE 0.12% ORAL RINSE 15 ML: 1.2 LIQUID ORAL at 20:19

## 2019-01-01 RX ADMIN — ALBUMIN (HUMAN) 12.5 G: 12.5 SOLUTION INTRAVENOUS at 07:32

## 2019-01-01 RX ADMIN — GLYCOPYRROLATE 0.2 MG: 0.2 INJECTION, SOLUTION INTRAMUSCULAR; INTRAVENOUS at 16:33

## 2019-01-01 RX ADMIN — EPINEPHRINE 1 MG: 0.1 INJECTION, SOLUTION ENDOTRACHEAL; INTRACARDIAC; INTRAVENOUS at 20:58

## 2019-01-01 RX ADMIN — ACETAMINOPHEN 975 MG: 325 TABLET ORAL at 15:39

## 2019-01-01 RX ADMIN — CALCIUM GLUCONATE 2 G: 98 INJECTION, SOLUTION INTRAVENOUS at 04:40

## 2019-01-01 RX ADMIN — MAGNESIUM SULFATE HEPTAHYDRATE 1 G: 1 INJECTION, SOLUTION INTRAVENOUS at 03:32

## 2019-01-01 RX ADMIN — NOREPINEPHRINE BITARTRATE 2 MCG/MIN: 1 INJECTION INTRAVENOUS at 02:40

## 2019-01-01 RX ADMIN — ACETAMINOPHEN 975 MG: 325 TABLET ORAL at 03:32

## 2019-01-01 RX ADMIN — ACETAMINOPHEN 975 MG: 325 TABLET ORAL at 08:21

## 2019-01-01 RX ADMIN — NOREPINEPHRINE BITARTRATE 14 MCG/MIN: 1 INJECTION INTRAVENOUS at 13:20

## 2019-01-01 RX ADMIN — FENTANYL CITRATE 100 MCG: 50 INJECTION INTRAMUSCULAR; INTRAVENOUS at 16:32

## 2019-01-01 RX ADMIN — CEFEPIME HYDROCHLORIDE 1000 MG: 1 INJECTION, POWDER, FOR SOLUTION INTRAMUSCULAR; INTRAVENOUS at 12:14

## 2019-01-01 RX ADMIN — POTASSIUM CHLORIDE 20 MEQ: 200 INJECTION, SOLUTION INTRAVENOUS at 11:15

## 2019-01-01 RX ADMIN — VASOPRESSIN 0.04 UNITS/MIN: 20 INJECTION INTRAVENOUS at 15:13

## 2019-01-01 RX ADMIN — MAGNESIUM SULFATE HEPTAHYDRATE 2 G: 40 INJECTION, SOLUTION INTRAVENOUS at 05:10

## 2019-06-18 PROBLEM — I46.9 CARDIAC ARREST (HCC): Status: ACTIVE | Noted: 2019-01-01

## 2019-06-18 PROBLEM — G93.40 ENCEPHALOPATHY ACUTE: Status: ACTIVE | Noted: 2019-01-01

## 2019-06-18 PROBLEM — J69.0 ASPIRATION PNEUMONITIS (HCC): Status: ACTIVE | Noted: 2019-01-01

## 2019-06-18 PROBLEM — E87.2 LACTIC ACID ACIDOSIS: Status: ACTIVE | Noted: 2019-01-01
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
## 2019-06-18 NOTE — RESPIRATORY THERAPY NOTE
ABG resulted to me from the laboratory, I notified Dr Axel Hills   only change made is the oxygen, dropped it to 40%

## 2019-06-18 NOTE — RESPIRATORY THERAPY NOTE
RT Ventilator Management Note  Gustabo Rodríguez 82 y o  female MRN: [id number]  Unit/Bed#: TR15 Encounter: [id number]      Daily Screen       6/17/2019 2112             Patient safety screen outcome[de-identified]  Failed    Not Ready for Weaning due to[de-identified]  Underline problem not resolved            Physical Exam:      Patient received from EMS, CPR is being performed  Patient regain heart rhythm, but no spontaneous breathing  Tube is a 7 at the 22 lip, on the right   Color change on the end tidal cap, BS: bilateral and wheezing, xray verified

## 2019-06-18 NOTE — ED PROVIDER NOTES
History  Chief Complaint   Patient presents with    Cardiac Arrest     Patient presents via EMS in cardiac arrest   Her son made her so she which she was eating when he heard a thump from the next room  He went to her room and found her face down next to her bed  He states she has fallen out of bed before but this time when he rolled her over she did not appear to be breathing  She had rice and dried seaweed in her mouth  He called 911 and began CPR as instructed  When EMS arrived a few minutes later, they found her pulseless, apneic and in asystole on their cardiac monitor  Her blood sugar was 170 in the home  She was intubated and had a peripheral IV placed for epinephrine boluses  The Elnor Chiu compression device was placed on her chest and she was transported here  A total of 6 rounds of epinephrine was given without apparent ROSC  However, upon arrival to the emergency department, the Elnor Chiu was paused and she had bounding carotid pulses  She remained apneic at this point  Ventilations were continued with bag valve mask support  Her pulses did weak in but bedside ultrasound demonstrated very good cardiac contractility with excellent valve motion  Therefore, an additional round of epinephrine was pushed while a right subclavian vein triple-lumen catheter was placed by me  Once placed, norepinephrine was infused with good return of pulses and blood pressure  Per her son, she has had no recent travel or sick contacts  She had no preceding complaint of f/c, HA, LH/dizziness, diaphoresis, CP, SOB, abdominal pain, n/v/d  12 system ROS o/w negative                   History provided by:  Medical records, relative and EMS personnel  History limited by:  Acuity of condition, patient unresponsive and intubated  Cardiac Arrest   Witnessed by:  Family member  Incident location:  Home  Time since incident:  30 minutes  Time before BLS initiated:  1-2 minutes  Time before ALS initiated:  5-8 minutes  Condition upon EMS arrival:  Apneic  Pulse:  Present  Initial cardiac rhythm per EMS:  Asystole  Treatments prior to arrival:  ACLS protocol, intubation and vascular access  Medications given prior to ED:  Epinephrine  IV access type:  Peripheral  Airway:  Intubation prior to arrival  Rhythm on admission to ED:  Atrial tachycardia      Prior to Admission Medications   Prescriptions Last Dose Informant Patient Reported? Taking?    Multiple Vitamin (MULTIVITAMIN) capsule  Outside Facility (Specify) Yes No   Sig: Take 1 capsule by mouth daily   albuterol (PROVENTIL HFA,VENTOLIN HFA) 90 mcg/act inhaler  Outside Facility (Specify) No No   Sig: Inhale 2 puffs every 4 (four) hours as needed for wheezing   alendronate (FOSAMAX) 70 mg tablet  Outside Facility (Specify) Yes No   Sig: Take by mouth   amLODIPine (NORVASC) 5 mg tablet  Outside Facility (Specify) No No   Sig: Take 1 tablet (5 mg total) by mouth daily   ascorbic acid (VITAMIN C) 500 MG tablet  Outside Facility (Specify) No No   Sig: Take 1 tablet (500 mg total) by mouth daily   atorvastatin (LIPITOR) 20 mg tablet   No No   Sig: Take 1 tablet by mouth daily with dinner for 30 days   atorvastatin (LIPITOR) 20 mg tablet   Yes No   doxazosin (CARDURA) 4 mg tablet  Outside Facility (Specify) Yes No   Sig: Take 4 mg by mouth daily at bedtime   ferrous sulfate 220 (44 Fe) mg/5 mL solution  Outside Facility (Specify) No No   Sig: Take 6 8 mL (300 mg total) by mouth daily   fluticasone-salmeterol (ADVAIR DISKUS) 100-50 mcg/dose inhaler  Outside Facility (Specify) Yes No   Sig: Inhale   furosemide (LASIX) 20 mg tablet  Outside Facility (Specify) Yes No   Sig: Take 20 mg by mouth 2 (two) times a day   hydrocortisone (ANUSOL-HC, PROCTOSOL HC,) 2 5 % rectal cream  Outside Facility (Specify) No No   Sig: Insert into the rectum 2 (two) times a day   hydrocortisone-pramoxine (PROCTOFOAM-HC) rectal foam  Outside Facility (Specify) No No   Sig: Insert 1 applicator into the rectum 2 (two) times a day   losartan-hydrochlorothiazide (HYZAAR) 100-12 5 MG per tablet  Outside Facility (Specify) Yes No   Sig: Take 1 tablet by mouth daily   menthol-zinc oxide (CALMOSEPTINE) 0 44-20 6 % OINT  Outside Facility (Specify) No No   Sig: Apply topically 2 (two) times a day   metoprolol tartrate (LOPRESSOR) 50 mg tablet  Outside Facility (Specify) No No   Sig: Take 1 tablet (50 mg total) by mouth every 12 (twelve) hours   oxybutynin (DITROPAN) 5 mg tablet  Outside Facility (Specify) Yes No   Sig: Take 5 mg by mouth daily     pantoprazole (PROTONIX) 40 mg tablet  Outside Facility (Specify) No No   Sig: Take 1 tablet (40 mg total) by mouth 2 (two) times a day before meals   senna-docusate sodium (SENOKOT S) 8 6-50 mg per tablet  Outside Facility (Specify) No No   Sig: Take 1 tablet by mouth 2 (two) times a day   valsartan (DIOVAN) 160 mg tablet  Outside Facility (Specify) No No   Sig: Take 1 tablet (160 mg total) by mouth 2 (two) times a day   zinc sulfate (ZINCATE) 220 mg capsule  Outside Facility (Specify) Yes No   Sig: Take 220 mg by mouth daily      Facility-Administered Medications: None       Past Medical History:   Diagnosis Date    Acute respiratory failure with hypoxia and hypercapnia (HCC)     Arthritis     Asthma     Hypertension     Pressure ulcer of sacral region, stage 2 7/11/2018    Stroke (HCC)     residual RLE weakness    Uncontrolled stage 2 hypertension 7/15/2018       Past Surgical History:   Procedure Laterality Date    CLOSED REDUCTION HUMERUS FRACTURE Right 11/29/2016    Procedure: CLOSED REDUCTION ARM/HUMERUS  CLOSED VS  OPEN SHOULDER REDUCTION;  Surgeon: Santa Meyer MD;  Location: [facility];  Service:     CLOSED REDUCTION HUMERUS FRACTURE Right 4/13/2018    Procedure: CLOSED REDUCTION ARM/HUMERUS;  Surgeon: Santa Meyer MD;  Location: [facility];  Service: Orthopedics    EYE SURGERY      bilateral CAT EXT W/IOLs    INCISION AND DRAINAGE OF WOUND Right 7/12/2018    Procedure: INCISION AND DRAINAGE (I&D) TRUNK;  Surgeon: Jennifer Hebert MD;  Location: [facility];  Service: General    RECTAL PROLAPSE REPAIR      done 5-6 yrs ago       Family History   Problem Relation Age of Onset    Hypertension Son      I have reviewed and agree with the history as documented  Social History     Tobacco Use    Smoking status: Never Smoker    Smokeless tobacco: Never Used   Substance Use Topics    Alcohol use: No    Drug use: No        Review of Systems   Unable to perform ROS: Intubated       Physical Exam  Physical Exam   Constitutional: She appears well-developed and well-nourished  She appears distressed (severe initially)  HENT:   Head: Normocephalic  Food in mouth, intubated  ETT placement confirmed with b/l breath sounds, ETT fogging, bilateral chest rise with ventilatory support, adequate pulse oxygenation and chest x-ray  Eyes: No scleral icterus  Partially opacified right cornea  Pupils 3-4 millimeter and very sluggish  Neck: Normal range of motion  Neck supple  Cardiovascular: Regular rhythm and intact distal pulses  No murmur heard  Tachycardic   Pulmonary/Chest: She has wheezes  Apneic, or at best agonal respirations   Abdominal: Soft  Bowel sounds are normal  She exhibits distension (Moderate)  There is no tenderness  Orogastric tube placed to low intermittent wall suction   Musculoskeletal: She exhibits no edema  Lymphadenopathy:     She has no cervical adenopathy  Neurological: She has normal reflexes  Unresponsive   Skin: Skin is warm and dry  No rash noted  She is not diaphoretic  No erythema  Vitals reviewed        Vital Signs  ED Triage Vitals   Temperature Pulse Respirations Blood Pressure SpO2   06/17/19 2130 06/17/19 2036 06/17/19 2054 06/17/19 2054 06/17/19 2054   (!) 97 1 °F (36 2 °C) (!) 120 18 (!) 201/93 93 %      Temp Source Heart Rate Source Patient Position - Orthostatic VS BP Location FiO2 (%)   06/17/19 2130 06/17/19 2115 06/17/19 2115 06/17/19 2115 06/17/19 2112   Temporal Monitor Lying Left arm 70      Pain Score       06/17/19 2130       No Pain           Vitals:    06/17/19 2330 06/17/19 2345 06/18/19 0000 06/18/19 0015   BP: 129/60 127/58 124/59 135/59   Pulse: 82 84 84 83   Patient Position - Orthostatic VS:             Visual Acuity      ED Medications  Medications   EPINEPHrine (ADRENALIN) injection (1 mg Intravenous Given 6/17/19 2047)   EPINEPHrine (ADRENALIN) injection (1 mg Intravenous Given 6/17/19 2058)       Diagnostic Studies  Results Reviewed     Procedure Component Value Units Date/Time    Arena draw [[id number]] Collected:  06/17/19 2103    Lab Status:  Final result Specimen:  Blood from Arm, Right Updated:  06/17/19 2301    Narrative: The following orders were created for panel order Arena draw  Procedure                               Abnormality         Status                     ---------                               -----------         ------                     Josephus Primrose Top on XFZN[[id number]]                            Final result               Gold top on ZNZG[[id number]]                                 Final result               Green / Yellow tube on PQNM[[id number]]                      Final result               Green / Black tube on EXGV[[id number]]                       Final result               Lavender Top 3 ml on PSGR[[id number]]                        Final result                 Please view results for these tests on the individual orders      CBC and differential [[id number]]  (Abnormal) Collected:  06/17/19 2131    Lab Status:  Final result Specimen:  Blood from Arm, Right Updated:  06/17/19 2203     WBC 18 55 Thousand/uL      RBC 2 90 Million/uL      Hemoglobin 5 3 g/dL      Hematocrit 21 1 %      MCV 73 fL      MCH 18 3 pg      MCHC 25 1 g/dL      RDW 23 7 %      Platelets 867 Thousands/uL      nRBC 0 /100 WBCs     Blood gas, arterial [[id number]]  (Abnormal) Collected:  06/17/19 2147    Lab Status: Final result Specimen:  Blood, Arterial from Radial, Right Updated:  06/17/19 2152     pH, Arterial 7 335     pCO2, Arterial 34 9 mm Hg      pO2, Arterial 219 5 mm Hg      HCO3, Arterial 18 2 mmol/L      Base Excess, Arterial -7 0 mmol/L      O2 Content, Arterial 8 7 mL/dL      O2 HGB,Arterial  97 3 %      SOURCE Radial, Right     Vent Type- AC AC     AC Rate 14     Tidal Volume 400 ml      Inspired Air (FIO2) 50     PEEP 5    Comprehensive metabolic panel [[id number]]  (Abnormal) Collected:  06/17/19 2103    Lab Status:  Final result Specimen:  Blood from Arm, Right Updated:  06/17/19 2135     Sodium 142 mmol/L      Potassium 4 4 mmol/L      Chloride 105 mmol/L      CO2 24 mmol/L      ANION GAP 13 mmol/L      BUN 30 mg/dL      Creatinine 1 87 mg/dL      Glucose 256 mg/dL      Calcium 8 5 mg/dL      AST 56 U/L      ALT 32 U/L      Alkaline Phosphatase 125 U/L      Total Protein 5 9 g/dL      Albumin 2 5 g/dL      Total Bilirubin 0 30 mg/dL      eGFR 25 ml/min/1 73sq m     Narrative:       [facility] guidelines for Chronic Kidney Disease (CKD):     Stage 1 with normal or high GFR (GFR > 90 mL/min/1 73 square meters)    Stage 2 Mild CKD (GFR = 60-89 mL/min/1 73 square meters)    Stage 3A Moderate CKD (GFR = 45-59 mL/min/1 73 square meters)    Stage 3B Moderate CKD (GFR = 30-44 mL/min/1 73 square meters)    Stage 4 Severe CKD (GFR = 15-29 mL/min/1 73 square meters)    Stage 5 End Stage CKD (GFR <15 mL/min/1 73 square meters)  Note: GFR calculation is accurate only with a steady state creatinine    Troponin I [[id number]]  (Normal) Collected:  06/17/19 2110    Lab Status:  Final result Specimen:  Blood Updated:  06/17/19 2132     Troponin I <0 02 ng/mL     Fingerstick Glucose (POCT) [[id number]]  (Abnormal) Collected:  06/17/19 2046    Lab Status:  Final result Updated:  06/17/19 2127     POC Glucose 263 mg/dl     Protime-INR [[id number]]  (Abnormal) Collected:  06/17/19 2103    Lab Status: Final result Specimen:  Blood from Arm, Right Updated:  06/17/19 2125     Protime 15 2 seconds      INR 1 26    APTT [[id number]]  (Abnormal) Collected:  06/17/19 2103    Lab Status:  Final result Specimen:  Blood from Arm, Right Updated:  06/17/19 2125     PTT 48 seconds                  CT head without contrast   Final Result by Wanda Guevara DO (06/18 0016)      No acute intracranial abnormality  Partial opacification of the right maxillary sinus suggestive of sinus                  Workstation performed: [id number]         XR chest 1 view portable   ED Interpretation by Elizabeth Soria DO (06/17 2140)   ETT again seen as mentioned  Right subclavian vein central line in adequate position without evidence of pneumothorax  Final Result by Mauricio Villagran DO (06/18 0813)      No obvious pneumothorax on limited supine imaging following central line placement  However, the course of the line extends to the left of midline more medial than would be expected for the course of the SVC  Line positioning cannot be determined on    this exam   Clinical correlation is advised  At the time of interpretation, a follow up film has already been obtained  Please see subsequent report  Workstation performed: [id number]         XR chest 1 view portable   ED Interpretation by Elizabeth Soria DO (06/17 2139)   Endotracheal tube in adequate position  No acute cardiopulmonary pathology  Final Result by Jayleen Michael MD (06/18 2588)      No acute cardiopulmonary disease  Satisfactory positioning of endotracheal tube  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  Workstation performed: [id number]                    Procedures  CriticalCare Time  Performed by: Elizabeth Soria DO  Authorized by:  Elizabeth Soria DO     Critical care provider statement:     Critical care time (minutes):  40    Critical care time was exclusive of:  Separately billable procedures and treating other patients    Critical care was necessary to treat or prevent imminent or life-threatening deterioration of the following conditions:  Cardiac failure and respiratory failure    Critical care was time spent personally by me on the following activities:  Obtaining history from patient or surrogate, development of treatment plan with patient or surrogate, discussions with consultants, evaluation of patient's response to treatment, examination of patient, ordering and performing treatments and interventions, ordering and review of laboratory studies, ordering and review of radiographic studies, re-evaluation of patient's condition, review of old charts and ventilator management           ED Course  ED Course as of Jun 18 1920 Mon Jun 17, 2019 2118 I spoke with the son, Adrienne murry of the current situation  He contacted his daughter and was brought to the bedside  2138 Normal 10 months ago  Will hydrate  Creatinine(!): 1 87 2138 Previously normal  Patient is not on warfarin    INR(!): 1 26 2155 FiO2 weaned down to 40 percent  She remains at 14/400 without peaking her pressures  Will admit  Blood gas, arterial(!)   2205 Baseline 7 8-9 0 over the past year  Hemoglobin(!!): 5 3   2303 [facility]  anticipated at 2345  MDM  Number of Diagnoses or Management Options  Diagnosis management comments: DDx:  Cardiac arrest - aspiration/respiratory arrest, ACS/MI, doubt sepsis, pneumonia, pneumothorax or PE as inciting causes  A/P: Will check cardiac w/u, continue ventilatory and inotropic pressure support, admit to the ICU         Amount and/or Complexity of Data Reviewed  Clinical lab tests: reviewed and ordered  Tests in the radiology section of CPT®: reviewed and ordered  Decide to obtain previous medical records or to obtain history from someone other than the patient: yes  Obtain history from someone other than the patient: yes (Son, EMS)  Review and summarize past medical records: yes  Independent visualization of images, tracings, or specimens: yes        Disposition  Final diagnoses:   Cardiac arrest ([facility] )   NA[facility] (acute kidney injury) ([facility] )   Anemia     Time reflects when diagnosis was documented in both MDM as applicable and the Disposition within this note     Time User Action Codes Description Comment    6/17/2019 10:43 PM [address],[place]  [address] [I46 [id number]] Cardiac arrest ([facility] )     6/17/2019 10:43 PM [address],[place]  [address], [address] [N17 [id number]] NANETTE (acute kidney injury) ([facility] )     6/17/2019 10:43 PM [address],[place]  [address], [address] [[id number]] Anemia       ED Disposition     ED Disposition Condition Date/Time Comment    Transfer to Another Facility-In Network  Mon Jun 17, 2019 10:43 PM Karin Gu should be transferred out to [facility] under the care of Dr Lynn Lam          Follow-up Information    None         Discharge Medication List as of 6/18/2019  1:01 AM      CONTINUE these medications which have NOT CHANGED    Details   albuterol (PROVENTIL HFA,VENTOLIN HFA) 90 mcg/act inhaler Inhale 2 puffs every 4 (four) hours as needed for wheezing, Starting Fri 7/27/2018, Normal      alendronate (FOSAMAX) 70 mg tablet Take by mouth, Historical Med      amLODIPine (NORVASC) 5 mg tablet Take 1 tablet (5 mg total) by mouth daily, Starting Thu 7/19/2018, Normal      ascorbic acid (VITAMIN C) 500 MG tablet Take 1 tablet (500 mg total) by mouth daily, Starting Thu 7/19/2018, Normal      atorvastatin (LIPITOR) 20 mg tablet Starting Wed 8/22/2018, Historical Med      doxazosin (CARDURA) 4 mg tablet Take 4 mg by mouth daily at bedtime, Historical Med      ferrous sulfate 220 (44 Fe) mg/5 mL solution Take 6 8 mL (300 mg total) by mouth daily, Starting Thu 7/19/2018, Normal      fluticasone-salmeterol (ADVAIR DISKUS) 100-50 mcg/dose inhaler Inhale, Historical Med      furosemide (LASIX) 20 mg tablet Take 20 mg by mouth 2 (two) times a day, Historical Med      hydrocortisone (ANUSOL-HC, PROCTOSOL HC,) 2 5 % rectal cream Insert into the rectum 2 (two) times a day, Starting Wed 7/18/2018, Normal      hydrocortisone-pramoxine (PROCTOFOAM-HC) rectal foam Insert 1 applicator into the rectum 2 (two) times a day, Starting Wed 4/18/2018, Normal      losartan-hydrochlorothiazide (HYZAAR) 100-12 5 MG per tablet Take 1 tablet by mouth daily, Historical Med      menthol-zinc oxide (CALMOSEPTINE) 0 44-20 6 % OINT Apply topically 2 (two) times a day, Starting Wed 7/18/2018, Normal      metoprolol tartrate (LOPRESSOR) 50 mg tablet Take 1 tablet (50 mg total) by mouth every 12 (twelve) hours, Starting Mon 4/16/2018, No Print      Multiple Vitamin (MULTIVITAMIN) capsule Take 1 capsule by mouth daily, Historical Med      oxybutynin (DITROPAN) 5 mg tablet Take 5 mg by mouth daily  , Historical Med      pantoprazole (PROTONIX) 40 mg tablet Take 1 tablet (40 mg total) by mouth 2 (two) times a day before meals, Starting Mon 4/16/2018, No Print      senna-docusate sodium (SENOKOT S) 8 6-50 mg per tablet Take 1 tablet by mouth 2 (two) times a day, Starting Wed 7/18/2018, Normal      valsartan (DIOVAN) 160 mg tablet Take 1 tablet (160 mg total) by mouth 2 (two) times a day, Starting Wed 7/18/2018, Normal      zinc sulfate (ZINCATE) 220 mg capsule Take 220 mg by mouth daily, Historical Med           No discharge procedures on file      ED Provider  Electronically Signed by           Chacorta Bucio DO  06/18/19 1920

## 2019-06-18 NOTE — ED PROCEDURE NOTE
PROCEDURE  CriticalCare Time  Performed by: Nitihn Castro DO  Authorized by:  Nithin Castro DO     Critical care provider statement:     Critical care time (minutes):  40    Critical care time was exclusive of:  Separately billable procedures and treating other patients    Critical care was necessary to treat or prevent imminent or life-threatening deterioration of the following conditions:  Cardiac failure and respiratory failure    Critical care was time spent personally by me on the following activities:  Obtaining history from patient or surrogate, development of treatment plan with patient or surrogate, evaluation of patient's response to treatment, examination of patient, ordering and performing treatments and interventions, ordering and review of laboratory studies, ordering and review of radiographic studies, re-evaluation of patient's condition, review of old charts and ventilator management    I assumed direction of critical care for this patient from another provider in my specialty: no           Nithin Castro DO  06/17/19 2034

## 2019-06-18 NOTE — ED PROCEDURE NOTE
PROCEDURE  ECG 12 Lead Documentation  Date/Time: 6/17/2019 8:50 PM  Performed by: Stefani Christian DO  Authorized by:  Stefani Christian DO     Indications / Diagnosis:  S/p cardiac arrest  ECG reviewed by me, the ED Provider: yes    Patient location:  ED  Interpretation:     Interpretation: abnormal    Rate:     ECG rate:  129    ECG rate assessment: tachycardic    Rhythm:     Rhythm: sinus tachycardia    Ectopy:     Ectopy: none    Conduction:     Conduction: normal    ST segments:     ST segments:  Abnormal  T waves:     T waves: normal           Stefani Christian DO  06/17/19 2206

## 2019-06-21 LAB
BACTERIA SPT RESP CULT: ABNORMAL
GRAM STN SPEC: ABNORMAL
GRAM STN SPEC: ABNORMAL
MRSA NOSE QL CULT: ABNORMAL
MRSA NOSE QL CULT: ABNORMAL

## 2019-06-24 LAB — BACTERIA BLD CULT: NORMAL

## (undated) DEVICE — SUT VICRYL 3-0 SH 27 IN J416H

## (undated) DEVICE — GLOVE INDICATOR PI UNDERGLOVE SZ 8 BLUE

## (undated) DEVICE — INTENDED FOR TISSUE SEPARATION, AND OTHER PROCEDURES THAT REQUIRE A SHARP SURGICAL BLADE TO PUNCTURE OR CUT.: Brand: BARD-PARKER SAFETY BLADES SIZE 15, STERILE

## (undated) DEVICE — SUT MONOCRYL 4-0 PS-2 18 IN Y496G

## (undated) DEVICE — ABDOMINAL PAD: Brand: DERMACEA

## (undated) DEVICE — CURITY PLAIN PACKING STRIP: Brand: CURITY

## (undated) DEVICE — BETHLEHEM UNIVERSAL MINOR GEN: Brand: CARDINAL HEALTH

## (undated) DEVICE — GLOVE SRG BIOGEL 8

## (undated) DEVICE — GAUZE SPONGES,16 PLY: Brand: CURITY